# Patient Record
Sex: MALE | Race: WHITE | NOT HISPANIC OR LATINO | Employment: FULL TIME | URBAN - METROPOLITAN AREA
[De-identification: names, ages, dates, MRNs, and addresses within clinical notes are randomized per-mention and may not be internally consistent; named-entity substitution may affect disease eponyms.]

---

## 2017-02-07 ENCOUNTER — ALLSCRIPTS OFFICE VISIT (OUTPATIENT)
Dept: OTHER | Facility: OTHER | Age: 42
End: 2017-02-07

## 2017-06-26 ENCOUNTER — APPOINTMENT (OUTPATIENT)
Dept: RADIOLOGY | Facility: MEDICAL CENTER | Age: 42
End: 2017-06-26
Payer: COMMERCIAL

## 2017-06-26 ENCOUNTER — TRANSCRIBE ORDERS (OUTPATIENT)
Dept: ADMINISTRATIVE | Facility: HOSPITAL | Age: 42
End: 2017-06-26

## 2017-06-26 ENCOUNTER — ALLSCRIPTS OFFICE VISIT (OUTPATIENT)
Dept: OTHER | Facility: OTHER | Age: 42
End: 2017-06-26

## 2017-06-26 DIAGNOSIS — M54.41 LOW BACK PAIN WITH RIGHT-SIDED SCIATICA: ICD-10-CM

## 2017-06-26 DIAGNOSIS — M25.551 PAIN IN RIGHT HIP: ICD-10-CM

## 2017-06-26 PROCEDURE — 72110 X-RAY EXAM L-2 SPINE 4/>VWS: CPT

## 2017-06-26 PROCEDURE — 73502 X-RAY EXAM HIP UNI 2-3 VIEWS: CPT

## 2017-06-26 PROCEDURE — 72200 X-RAY EXAM SI JOINTS: CPT

## 2017-06-30 ENCOUNTER — ALLSCRIPTS OFFICE VISIT (OUTPATIENT)
Dept: OTHER | Facility: OTHER | Age: 42
End: 2017-06-30

## 2017-12-18 ENCOUNTER — ALLSCRIPTS OFFICE VISIT (OUTPATIENT)
Dept: OTHER | Facility: OTHER | Age: 42
End: 2017-12-18

## 2017-12-19 NOTE — PROGRESS NOTES
Assessment  1  Influenza (487 1) (J11 1)    Plan  Influenza    · Oseltamivir Phosphate 75 MG Oral Capsule (Tamiflu); TAKE 1 CAPSULE TWICEDAILY WITH MEALS   · Follow Up if Not Better Evaluation and Treatment  Follow-up  Status: Complete  Done:28Ahr5933 03:49PM    Discussion/Summary    Note for patient to remain out of work for 1 week  Chief Complaint  Patient has a cold since yesterday and feeling nausea and dizzy    Patient has of headaches, being tried, no post nasal drip, right ear hurts and has chills  Patient has no other concerns  History of Present Illness  HPI: Patient is here with cough, rhinorrhea, headache, fatigue, achy, chills over the past day  No diarrhea  Also the patient with nauseousness but no vomiting  No treatment  Review of Systems   Constitutional: as noted in HPI   ENT: as noted in HPI  Cardiovascular: no complaints of slow or fast heart rate, no chest pain, no palpitations, no leg claudication or lower extremity edema  Respiratory: as noted in HPI  Gastrointestinal: as noted in HPI  Genitourinary: no complaints of dysuria or incontinence, no hesitancy, no nocturia, no genital lesion, no inadequacy of penile erection  Musculoskeletal: as noted in HPI  Integumentary: no complaints of skin rash or lesion, no itching or dry skin, no skin wounds  Neurological: no complaints of headache, no confusion, no numbness or tingling, no dizziness or fainting  Active Problems  1  Abnormal level of serum enzyme (790 5) (R74 9)   2  Anal fissure (565 0) (K60 2)   3  Anxiety disorder due to medical condition (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 84) (F06 4)   4  Elevated C-reactive protein (790 95) (R79 82)   5  Erectile dysfunction of non-organic origin (302 72) (F52 21)   6  Esophageal reflux (530 81) (K21 9)   7  Generalized anxiety disorder (300 02) (F41 1)   8  Greater trochanteric bursitis, right (726 5) (M70 61)   9  Hemorrhoid (455 6) (K64 9)   10  Hypercholesterolemia (272 0) (E78 00)   11   Hypertension (401  9) (I10)   12  IFG (impaired fasting glucose) (790 21) (R73 01)   13  Obesity (BMI 30-39 9) (278 00) (E66 9)   14  Right hip pain (719 45) (M25 551)   15  Right-sided low back pain with sciatica (724 3) (M54 41)   16  Thyroid disorder (246 9) (E07 9)   17  Vitamin D deficiency (268 9) (E55 9)   18  Wart (078 10) (B07 9)    Past Medical History  1  History of Anxiety (300 00) (F41 9)    Family History  Mother    1  Family history of Hypertension (V17 49)   2  Family history of Type 2 Diabetes Mellitus  Father    3  Family history of Hypertension (V17 49)    Social History   · Being A Social Drinker   · Denied: History of Drug Use   · Never A Smoker    Surgical History  1  History of Excision Of Intra-Abdominal Mass   2  History of Inguinal Hernia Repair    Current Meds   1  ClonazePAM 2 MG Oral Tablet; TAKE 1 TABLET BY MOUTH 3 TIMES A DAY AS NEEDED; Therapy: 18HOG1036 to (Evaluate:06Jan2018)  Requested for: 52Vat9999; Last Rx:07Dec2017 Ordered   2  Meloxicam 15 MG Oral Tablet; TAKE 1 TABLET DAILY WITH FOOD; Therapy: 16NTE5769 to (Evaluate:32Zok4946)  Requested for: 88FXN7567; Last Rx:26Jun2017 Ordered   3  PredniSONE 10 MG Oral Tablet; Take 6 pills x 3 days, 4 pills x 3 days, 2 pills x 3 days, then 1 pill x 3 days; Therapy: 89OAY7520 to (Matthew Dixon)  Requested for: 26Jun2017; Last SV:92TXY2379 Ordered   4  Rosuvastatin Calcium 5 MG Oral Tablet; TAKE 1 TABLET DAILY; Therapy: 84AUJ9096 to (Evaluate:89Bvs2834)  Requested for: 28WNS3902; Last HY:57BMQ9643 Ordered   5  Staxyn 10 MG Oral Tablet Disintegrating; PLACE 1 TABLET ON TONGUE ABOUT 1 HOUR BEFORE SEXUAL ACTIVITY; Therapy: 28JJC6483 to (Evaluate:30Jan2018)  Requested for: 76Cxs8841; Last Rx:39Jem4558 Ordered   6  TraMADol HCl - 50 MG Oral Tablet; TAKE 1 TABLET EVERY 6 HOURS AS NEEDED FOR PAIN; Therapy: 33MRE0977 to (Evaluate:78Ygw1609); Last Rx:30Jun2017 Ordered    The medication list was reviewed and updated today  Allergies  1   Amoxicillin CAPS 2  Penicillins    Vitals   Recorded: 13AOR7046 03:37PM   Temperature 98 3 F, Tympanic   Systolic 311, RLE, Sitting   Diastolic 78, RLE, Sitting   Height 5 ft 11 in   Weight 272 lb    BMI Calculated 37 94   BSA Calculated 2 4     Physical Exam   Constitutional  General appearance: Abnormal  -- fatigue /tired  Eyes  Conjunctiva and lids: No swelling, erythema, or discharge  Pupils and irises: Equal, round and reactive to light  Ears, Nose, Mouth, and Throat  External inspection of ears and nose: Normal    Otoscopic examination: Tympanic membrance translucent with normal light reflex  Canals patent without erythema  Nasal mucosa, septum, and turbinates: Normal without edema or erythema  Oropharynx: Abnormal  -- postnasal drip  Pulmonary  Respiratory effort: No increased work of breathing or signs of respiratory distress  Auscultation of lungs: Clear to auscultation, equal breath sounds bilaterally, no wheezes, no rales, no rhonci  Cardiovascular  Palpation of heart: Normal PMI, no thrills  Auscultation of heart: Normal rate and rhythm, normal S1 and S2, without murmurs  Examination of extremities for edema and/or varicosities: Normal    Abdomen  Abdomen: Non-tender, no masses  Liver and spleen: No hepatomegaly or splenomegaly  Lymphatic  Palpation of lymph nodes in neck: No lymphadenopathy  Musculoskeletal  Gait and station: Abnormal    Digits and nails: Normal without clubbing or cyanosis  Inspection/palpation of joints, bones, and muscles: Abnormal    Skin  Skin and subcutaneous tissue: Normal without rashes or lesions  Neurologic  Cranial nerves: Cranial nerves 2-12 intact  Reflexes: 2+ and symmetric  Sensation: Abnormal  -- Decreased sensation and strength    Psychiatric  Orientation to person, place and time: Normal    Mood and affect: Normal          Signatures   Electronically signed by : Catina Hagan DO; Dec 18 2017  3:50PM EST                       (Author)

## 2017-12-19 NOTE — PROGRESS NOTES
Assessment  1  Influenza (487 1) (J11 1)   2  Hypertension (401 9) (I10)   3  Hypercholesterolemia (272 0) (E78 00)   4  Generalized anxiety disorder (300 02) (F41 1)    Plan  Anxiety disorder due to medical condition    · ClonazePAM 2 MG Oral Tablet; TAKE 1 TABLET BY MOUTH 3 TIMES A DAY ASNEEDED  Hypercholesterolemia    · Rosuvastatin Calcium 5 MG Oral Tablet (Crestor); TAKE 1 TABLET DAILY  Influenza    · Oseltamivir Phosphate 75 MG Oral Capsule (Tamiflu); TAKE 1 CAPSULE TWICEDAILY WITH MEALS   · Follow Up if Not Better Evaluation and Treatment  Follow-up  Status: Complete  Done:17Wxl7861    Chief Complaint  Patient needs refill for meds  No other concerns  History of Present Illness  The the patient is here for follow-up on anxiety, hypertension and a day  Patient will need refills  Patient with flu-like symptoms at this present time  See other note  Review of Systems   Constitutional: No fever or chills, feels well, no tiredness, no recent weight gain or weight loss  Eyes: No complaints of eye pain, no red eyes, no discharge from eyes, no itchy eyes  ENT: no complaints of earache, no hearing loss, no nosebleeds, no nasal discharge, no sore throat, no hoarseness  Cardiovascular: No complaints of slow heart rate, no fast heart rate, no chest pain, no palpitations, no leg claudication, no lower extremity  Respiratory: No complaints of shortness of breath, no wheezing, no cough, no SOB on exertion, no orthopnea or PND  Gastrointestinal: No complaints of abdominal pain, no constipation, no nausea or vomiting, no diarrhea or bloody stools  Genitourinary: No complaints of dysuria, no incontinence, no hesitancy, no nocturia, no genital lesion, no testicular pain  Musculoskeletal: No complaints of arthralgia, no myalgias, no joint swelling or stiffness, no limb pain or swelling  Integumentary: No complaints of skin rash or skin lesions, no itching, no skin wound, no dry skin    Neurological: No compliants of headache, no confusion, no convulsions, no numbness or tingling, no dizziness or fainting, no limb weakness, no difficulty walking  Psychiatric: Is not suicidal, no sleep disturbances, no anxiety or depression, no change in personality, no emotional problems  Endocrine: No complaints of proptosis, no hot flashes, no muscle weakness, no erectile dysfunction, no deepening of the voice, no feelings of weakness  Hematologic/Lymphatic: No complaints of swollen glands, no swollen glands in the neck, does not bleed easily, no easy bruising  Active Problems  1  Abnormal level of serum enzyme (790 5) (R74 9)   2  Anal fissure (565 0) (K60 2)   3  Anxiety disorder due to medical condition (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 84) (F06 4)   4  Elevated C-reactive protein (790 95) (R79 82)   5  Erectile dysfunction of non-organic origin (302 72) (F52 21)   6  Esophageal reflux (530 81) (K21 9)   7  Generalized anxiety disorder (300 02) (F41 1)   8  Greater trochanteric bursitis, right (726 5) (M70 61)   9  Hemorrhoid (455 6) (K64 9)   10  Hypercholesterolemia (272 0) (E78 00)   11  Hypertension (401 9) (I10)   12  IFG (impaired fasting glucose) (790 21) (R73 01)   13  Obesity (BMI 30-39 9) (278 00) (E66 9)   14  Right hip pain (719 45) (M25 551)   15  Right-sided low back pain with sciatica (724 3) (M54 41)   16  Thyroid disorder (246 9) (E07 9)   17  Vitamin D deficiency (268 9) (E55 9)   18  Wart (078 10) (B07 9)    Past Medical History  1  History of Anxiety (300 00) (F41 9)    The active problems and past medical history were reviewed and updated today  Surgical History  1  History of Excision Of Intra-Abdominal Mass   2  History of Inguinal Hernia Repair    Family History  Mother    1  Family history of Hypertension (V17 49)   2  Family history of Type 2 Diabetes Mellitus  Father    3  Family history of Hypertension (V17 49)    Social History   · Being A Social Drinker   · Denied: History of Drug Use   · Never A Smoker    Current Meds   1  ClonazePAM 2 MG Oral Tablet; TAKE 1 TABLET BY MOUTH 3 TIMES A DAY AS NEEDED; Therapy: 12ODS7532 to (Evaluate:06Jan2018)  Requested for: 62Juy0487; Last Rx:26Ywt5440 Ordered   2  Rosuvastatin Calcium 5 MG Oral Tablet; TAKE 1 TABLET DAILY; Therapy: 88BVX7384 to (Evaluate:23Dec2017)  Requested for: 16FNP3885; Last Rx:26Jun2017 Ordered   3  Staxyn 10 MG Oral Tablet Disintegrating; PLACE 1 TABLET ON TONGUE ABOUT 1 HOUR BEFORE SEXUAL ACTIVITY; Therapy: 49MCM6246 to (Evaluate:30Jan2018)  Requested for: 59Qeg0650; Last Rx:46Obd9007 Ordered    The medication list was reviewed and updated today  Allergies  1  Amoxicillin CAPS   2  Penicillins    Vitals  Vital Signs    Recorded: 52EKY0452 03:37PM   Temperature 98 3 F, Tympanic   Systolic 081, RLE, Sitting   Diastolic 78, RLE, Sitting   Height 5 ft 11 in   Weight 272 lb    BMI Calculated 37 94   BSA Calculated 2 4     Physical Exam   Constitutional  General appearance: Abnormal  -- fatigue /tired  Eyes  Conjunctiva and lids: No swelling, erythema, or discharge  Pupils and irises: Equal, round and reactive to light  Ears, Nose, Mouth, and Throat  External inspection of ears and nose: Normal    Otoscopic examination: Tympanic membrance translucent with normal light reflex  Canals patent without erythema  Nasal mucosa, septum, and turbinates: Normal without edema or erythema  Oropharynx: Abnormal  -- postnasal drip  Pulmonary  Respiratory effort: No increased work of breathing or signs of respiratory distress  Auscultation of lungs: Clear to auscultation, equal breath sounds bilaterally, no wheezes, no rales, no rhonci  Cardiovascular  Palpation of heart: Normal PMI, no thrills  Auscultation of heart: Normal rate and rhythm, normal S1 and S2, without murmurs  Examination of extremities for edema and/or varicosities: Normal    Abdomen  Abdomen: Non-tender, no masses  Liver and spleen: No hepatomegaly or splenomegaly     Lymphatic  Palpation of lymph nodes in neck: No lymphadenopathy  Musculoskeletal  Gait and station: Abnormal    Digits and nails: Normal without clubbing or cyanosis  Inspection/palpation of joints, bones, and muscles: Abnormal    Skin  Skin and subcutaneous tissue: Normal without rashes or lesions  Neurologic  Cranial nerves: Cranial nerves 2-12 intact  Reflexes: 2+ and symmetric  Sensation: No sensory loss     Psychiatric  Orientation to person, place and time: Normal    Mood and affect: Normal          Signatures   Electronically signed by : Lisseth Leach DO; Dec 18 2017  3:54PM EST                       (Author)

## 2018-01-12 VITALS
DIASTOLIC BLOOD PRESSURE: 70 MMHG | WEIGHT: 287.5 LBS | HEIGHT: 72 IN | SYSTOLIC BLOOD PRESSURE: 122 MMHG | BODY MASS INDEX: 38.94 KG/M2

## 2018-01-13 VITALS
BODY MASS INDEX: 36.44 KG/M2 | HEIGHT: 72 IN | SYSTOLIC BLOOD PRESSURE: 130 MMHG | DIASTOLIC BLOOD PRESSURE: 76 MMHG | WEIGHT: 269 LBS

## 2018-01-15 VITALS
DIASTOLIC BLOOD PRESSURE: 90 MMHG | HEIGHT: 72 IN | BODY MASS INDEX: 35.79 KG/M2 | WEIGHT: 264.25 LBS | SYSTOLIC BLOOD PRESSURE: 140 MMHG

## 2018-01-18 ENCOUNTER — ALLSCRIPTS OFFICE VISIT (OUTPATIENT)
Dept: OTHER | Facility: OTHER | Age: 43
End: 2018-01-18

## 2018-01-18 ENCOUNTER — APPOINTMENT (OUTPATIENT)
Dept: RADIOLOGY | Facility: MEDICAL CENTER | Age: 43
End: 2018-01-18
Payer: COMMERCIAL

## 2018-01-18 ENCOUNTER — TRANSCRIBE ORDERS (OUTPATIENT)
Dept: ADMINISTRATIVE | Facility: HOSPITAL | Age: 43
End: 2018-01-18

## 2018-01-18 DIAGNOSIS — R07.81 PLEURODYNIA: ICD-10-CM

## 2018-01-18 PROCEDURE — 71101 X-RAY EXAM UNILAT RIBS/CHEST: CPT

## 2018-01-19 ENCOUNTER — GENERIC CONVERSION - ENCOUNTER (OUTPATIENT)
Dept: OTHER | Facility: OTHER | Age: 43
End: 2018-01-19

## 2018-01-19 NOTE — PROGRESS NOTES
Assessment   1  Rib pain on right side (786 50) (R07 81)   2  Right shoulder pain (719 41) (M25 511)   3  Status post fall (V15 88) (Z91 81)    Plan   Rib pain on right side    · * XR RIBS RIGHT W PA CHEST MIN 3 VIEWS; Status:Active; Requested MMF:06BSX3461;   Rib pain on right side, Right shoulder pain, Status post fall    · Cyclobenzaprine HCl - 10 MG Oral Tablet; TAKE 1 TABLET AT BEDTIME   · Meloxicam 15 MG Oral Tablet; TAKE 1 TABLET DAILY WITH FOOD   · Follow Up if Not Better Evaluation and Treatment  Follow-up  Status: Complete  Done:    85NMZ5152 11:19AM    Discussion/Summary      Note for patient return to work on Tuesday  Patient will go for x-rays to rule out rib fracture  Patient status post fall  Patient right shoulder /trapezius muscle strain more muscular in nature  Patient will try Flexeril at night  Patient may use ice to right ribs 10 minutes at a time 3 to 4 times a day  patient may use Tylenol as needed  Possible side effects of new medications were reviewed with the patient/guardian today  The treatment plan was reviewed with the patient/guardian  The patient/guardian understands and agrees with the treatment plan      Chief Complaint   pt slipped and fell yesterday and landed on the R side of his body he c/o rib pain and soreness  History of Present Illness   HPI: Patient is here status post fall in his driveway falling forward hitting his right shoulder and causing pain to his right lower rib cage laterally  No loss of consciousness  No headaches  Patient also with some neck pain 4 to 5/10  patient with no radicular symptoms to left arm  Patient does have some pain in his right shoulder  No chest pain or shortness of breath  Patient has some pain right ribs with coughing sneezing etc  Patient did use Advil /Motrin        Review of Systems        Constitutional: as noted in HPI       ENT: no complaints of earache, no loss of hearing, no nosebleeds or nasal discharge, no sore throat or hoarseness  Cardiovascular: no complaints of slow or fast heart rate, no chest pain, no palpitations, no leg claudication or lower extremity edema  Respiratory: no complaints of shortness of breath, no wheezing or cough, no dyspnea on exertion, no orthopnea or PND  Gastrointestinal: no complaints of abdominal pain, no constipation, no nausea or vomiting, no diarrhea or bloody stools  Genitourinary: no complaints of dysuria or incontinence, no hesitancy, no nocturia, no genital lesion, no inadequacy of penile erection  Musculoskeletal: as noted in HPI  Integumentary: no complaints of skin rash or lesion, no itching or dry skin, no skin wounds  Neurological: no complaints of headache, no confusion, no numbness or tingling, no dizziness or fainting  Active Problems   1  Abnormal level of serum enzyme (790 5) (R74 9)   2  Anal fissure (565 0) (K60 2)   3  Anxiety disorder due to medical condition (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) (F06 4)   4  Elevated C-reactive protein (790 95) (R79 82)   5  Erectile dysfunction of non-organic origin (302 72) (F52 21)   6  Esophageal reflux (530 81) (K21 9)   7  Generalized anxiety disorder (300 02) (F41 1)   8  Greater trochanteric bursitis, right (726 5) (M70 61)   9  Hemorrhoid (455 6) (K64 9)   10  Hypercholesterolemia (272 0) (E78 00)   11  Hypertension (401 9) (I10)   12  IFG (impaired fasting glucose) (790 21) (R73 01)   13  Influenza (487 1) (J11 1)   14  Obesity (BMI 30-39 9) (278 00) (E66 9)   15  Right hip pain (719 45) (M25 551)   16  Right-sided low back pain with sciatica (724 3) (M54 41)   17  Thyroid disorder (246 9) (E07 9)   18  Vitamin D deficiency (268 9) (E55 9)   19  Wart (078 10) (B07 9)    Past Medical History   1  History of Anxiety (300 00) (F41 9)   2  History of Right shoulder pain (719 41) (M25 511)  Active Problems And Past Medical History Reviewed: The active problems and past medical history were reviewed and updated today        Family History   Mother    1  Family history of Hypertension (V17 49)   2  Family history of Type 2 Diabetes Mellitus  Father    3  Family history of Hypertension (V17 49)    Social History    · Being A Social Drinker   · Denied: History of Drug Use   · Never A Smoker    Surgical History   1  History of Excision Of Intra-Abdominal Mass   2  History of Inguinal Hernia Repair    Current Meds    1  ClonazePAM 2 MG Oral Tablet; TAKE 1 TABLET BY MOUTH 3 TIMES A DAY AS     NEEDED; Therapy: 26ZSX2530 to (Evaluate:18Mar2018)  Requested for: 55ERR1339; Last     Rx:74Txr6909 Ordered   2  Oseltamivir Phosphate 75 MG Oral Capsule; TAKE 1 CAPSULE TWICE DAILY WITH     MEALS; Therapy: 90YWC3534 to (Evaluate:23Dec2017)  Requested for: 18PBY7003; Last     Rx:16Ghu7043 Ordered   3  Rosuvastatin Calcium 5 MG Oral Tablet; TAKE 1 TABLET DAILY; Therapy: 66YRM9133 to (Evaluate:11Mar2018)  Requested for: 66NUR9645; Last     Rx:10Jan2018 Ordered   4  Staxyn 10 MG Oral Tablet Disintegrating; PLACE 1 TABLET ON TONGUE ABOUT 1     HOUR BEFORE SEXUAL ACTIVITY; Therapy: 44YVS6756 to (Evaluate:30Jan2018)  Requested for: 40Kok7876; Last     Rx:59Vqn8380 Ordered     The medication list was reviewed and updated today  Allergies   1  Amoxicillin CAPS   2  Penicillins    Vitals    Recorded: 65BMJ5455 35:22MR   Systolic 327   Diastolic 80   Height 5 ft 11 in   Weight 280 lb 2 oz   BMI Calculated 39 07   BSA Calculated 2 43     Physical Exam        Constitutional      General appearance: Abnormal  -- in pain  Eyes      Conjunctiva and lids: No swelling, erythema, or discharge  Pupils and irises: Equal, round and reactive to light  Ears, Nose, Mouth, and Throat      External inspection of ears and nose: Normal        Otoscopic examination: Tympanic membrance translucent with normal light reflex  Canals patent without erythema  Nasal mucosa, septum, and turbinates: Normal without edema or erythema         Oropharynx: Normal with no erythema, edema, exudate or lesions  Pulmonary      Respiratory effort: No increased work of breathing or signs of respiratory distress  Auscultation of lungs: Clear to auscultation, equal breath sounds bilaterally, no wheezes, no rales, no rhonci  Cardiovascular      Palpation of heart: Normal PMI, no thrills  Auscultation of heart: Normal rate and rhythm, normal S1 and S2, without murmurs  Examination of extremities for edema and/or varicosities: Normal        Abdomen      Abdomen: Non-tender, no masses  Liver and spleen: No hepatomegaly or splenomegaly  Lymphatic      Palpation of lymph nodes in neck: No lymphadenopathy  Musculoskeletal      Gait and station: Abnormal        Digits and nails: Abnormal  -- Paravertebral muscle spasm and pain in the right trapezius muscle  No cervical pain with palpation  Patient does have some pain over the [facility] joint on the right  Inspection/palpation of joints, bones, and muscles: Abnormal  -- Patient with pain over lower ribs laterally on the right  Skin      Skin and subcutaneous tissue: Normal without rashes or lesions  Neurologic      Cranial nerves: Cranial nerves 2-12 intact  Reflexes: 2+ and symmetric  Sensation: No sensory loss         Psychiatric      Orientation to person, place and time: Normal        Mood and affect: Normal           Signatures    Electronically signed by : Wong Moyer DO; Jan 18 2018 11:19AM EST                       (Author)

## 2018-01-23 ENCOUNTER — ALLSCRIPTS OFFICE VISIT (OUTPATIENT)
Dept: OTHER | Facility: OTHER | Age: 43
End: 2018-01-23

## 2018-01-23 VITALS
WEIGHT: 272 LBS | HEIGHT: 71 IN | TEMPERATURE: 98.3 F | DIASTOLIC BLOOD PRESSURE: 78 MMHG | BODY MASS INDEX: 38.08 KG/M2 | SYSTOLIC BLOOD PRESSURE: 134 MMHG

## 2018-01-23 VITALS
BODY MASS INDEX: 39.22 KG/M2 | SYSTOLIC BLOOD PRESSURE: 134 MMHG | WEIGHT: 280.13 LBS | DIASTOLIC BLOOD PRESSURE: 80 MMHG | HEIGHT: 71 IN

## 2018-01-23 NOTE — RESULT NOTES
Verified Results  * XR RIBS RIGHT W PA CHEST MIN 3 VIEWS 32CTY1485 12:11PM Chela De La Fuente Order Number: [id number]     Test Name Result Flag Reference   XR RIBS RIGHT W PA CHEST MIN 3 VIEWS (Report)     RIGHT RIBS AND CHEST - DUAL ENERGY     INDICATION: Right rib pain  COMPARISON: None     VIEWS: PA chest (including soft tissue/bone algorithms) and 3 views right hemithorax     IMAGES: 8     FINDINGS:     The cardiomediastinal silhouette is unremarkable  Lungs are clear  No pleural effusions  There is no pneumothorax  No rib fractures are identified  IMPRESSION:     1  No active pulmonary disease  2  No evidence of rib fractures         Workstation performed: [id number]     Signed by:   Jimbo Craft MD   1/18/18

## 2018-01-23 NOTE — MISCELLANEOUS
Message  Return to work or school:   Guerline Kaplan is under my professional care   He was seen in my office on 01/18/18     He is able to return to school on  01/23/18         Signatures   Electronically signed by : Lani Cochran, ; Jan 18 2018 11:22AM EST                       (Author)

## 2018-01-23 NOTE — MISCELLANEOUS
Message  Return to work or school:   Lashawn Vyas is under my professional care   He was seen in my office on 12/18/2017   He is able to return to work on  12/26/2017            Signatures   Electronically signed by : Merlin Console, MA; Dec 18 2017  4:00PM EST                       (Author)

## 2018-01-24 NOTE — MISCELLANEOUS
Message  Return to work or school:   Laura Rooney is under my professional care   He was seen in my office on 01/23/18   He is able to return to work on   01/25/18           Signatures   Electronically signed by : Ana Samuel, ; Jan 23 2018 10:35AM EST                       (Author)

## 2018-02-19 DIAGNOSIS — M54.2 NECK PAIN: Primary | ICD-10-CM

## 2018-02-19 RX ORDER — CYCLOBENZAPRINE HCL 10 MG
TABLET ORAL
Qty: 20 TABLET | Refills: 0 | Status: SHIPPED | OUTPATIENT
Start: 2018-02-19 | End: 2018-11-27

## 2018-05-02 DIAGNOSIS — F41.9 ANXIETY: Primary | ICD-10-CM

## 2018-05-02 NOTE — TELEPHONE ENCOUNTER
Patient is calling asking for a refill of his Klonopin  He only has 5 pills left and takes 3 pills daily  Please advise if you will fill this medication for the patient in Dr Ethan Hurtado absence? Thank you

## 2018-05-03 RX ORDER — CLONAZEPAM 2 MG/1
TABLET ORAL
Qty: 90 TABLET | Refills: 0 | OUTPATIENT
Start: 2018-05-03 | End: 2018-06-07 | Stop reason: SDUPTHER

## 2018-05-16 DIAGNOSIS — N52.8 OTHER MALE ERECTILE DYSFUNCTION: Primary | ICD-10-CM

## 2018-05-17 RX ORDER — SILDENAFIL CITRATE 100 MG
TABLET ORAL
Qty: 10 TABLET | Refills: 0 | Status: SHIPPED | OUTPATIENT
Start: 2018-05-17 | End: 2018-05-23 | Stop reason: SDUPTHER

## 2018-05-18 NOTE — TELEPHONE ENCOUNTER
Pharmacy called to inform us that patient insurance says this medication needs a prior auth; sending to [facility] to handle today

## 2018-05-23 ENCOUNTER — TELEPHONE (OUTPATIENT)
Dept: FAMILY MEDICINE CLINIC | Facility: CLINIC | Age: 43
End: 2018-05-23

## 2018-05-23 DIAGNOSIS — N52.8 OTHER MALE ERECTILE DYSFUNCTION: ICD-10-CM

## 2018-05-23 NOTE — TELEPHONE ENCOUNTER
[facility] notified our office the prescription for Viagra 100mg requires authorization through the pt's insurance  This Henrry Age has been initiated, awaiting response

## 2018-05-23 NOTE — TELEPHONE ENCOUNTER
Pt called stating he heard back from the insurance - the viagra is approved  He is requesting a new script for the brand name be submitted

## 2018-05-24 RX ORDER — SILDENAFIL CITRATE 100 MG
TABLET ORAL
Qty: 10 TABLET | Refills: 0 | Status: SHIPPED | OUTPATIENT
Start: 2018-05-24 | End: 2018-11-27

## 2018-05-30 NOTE — TELEPHONE ENCOUNTER
Per [facility], the Esteban Leigh has been approved  It is valid from 4/23/18 - 5/23/19  Valid for qty 10 per 10 days  I left the information on vmail at [facility] pharmacy as well as left message notifying pt

## 2018-06-05 DIAGNOSIS — F41.9 ANXIETY: ICD-10-CM

## 2018-06-05 DIAGNOSIS — N52.8 OTHER MALE ERECTILE DYSFUNCTION: ICD-10-CM

## 2018-06-06 RX ORDER — SILDENAFIL 25 MG/1
100 TABLET, FILM COATED ORAL AS NEEDED
Qty: 10 TABLET | Refills: 0 | OUTPATIENT
Start: 2018-06-06

## 2018-06-06 RX ORDER — CLONAZEPAM 2 MG/1
TABLET ORAL
Qty: 90 TABLET | Refills: 1 | OUTPATIENT
Start: 2018-06-06

## 2018-06-07 ENCOUNTER — OFFICE VISIT (OUTPATIENT)
Dept: FAMILY MEDICINE CLINIC | Facility: CLINIC | Age: 43
End: 2018-06-07
Payer: COMMERCIAL

## 2018-06-07 VITALS
SYSTOLIC BLOOD PRESSURE: 126 MMHG | DIASTOLIC BLOOD PRESSURE: 84 MMHG | HEIGHT: 72 IN | WEIGHT: 263.8 LBS | BODY MASS INDEX: 35.73 KG/M2

## 2018-06-07 DIAGNOSIS — F41.1 GENERALIZED ANXIETY DISORDER: ICD-10-CM

## 2018-06-07 DIAGNOSIS — E78.00 HYPERCHOLESTEROLEMIA: ICD-10-CM

## 2018-06-07 DIAGNOSIS — I10 ESSENTIAL HYPERTENSION: Primary | ICD-10-CM

## 2018-06-07 DIAGNOSIS — F41.9 ANXIETY: ICD-10-CM

## 2018-06-07 DIAGNOSIS — F52.21 ERECTILE DYSFUNCTION OF NON-ORGANIC ORIGIN: ICD-10-CM

## 2018-06-07 DIAGNOSIS — G89.29 CHRONIC PAIN OF LEFT KNEE: ICD-10-CM

## 2018-06-07 DIAGNOSIS — M25.562 CHRONIC PAIN OF LEFT KNEE: ICD-10-CM

## 2018-06-07 PROCEDURE — 3079F DIAST BP 80-89 MM HG: CPT | Performed by: FAMILY MEDICINE

## 2018-06-07 PROCEDURE — 3074F SYST BP LT 130 MM HG: CPT | Performed by: FAMILY MEDICINE

## 2018-06-07 PROCEDURE — 99214 OFFICE O/P EST MOD 30 MIN: CPT | Performed by: FAMILY MEDICINE

## 2018-06-07 RX ORDER — SILDENAFIL CITRATE 20 MG/1
20 TABLET ORAL 3 TIMES DAILY
Qty: 9 TABLET | Refills: 5 | Status: SHIPPED | OUTPATIENT
Start: 2018-06-07 | End: 2018-11-27

## 2018-06-07 RX ORDER — CLONAZEPAM 2 MG/1
2 TABLET ORAL 3 TIMES DAILY
Qty: 90 TABLET | Refills: 3 | Status: SHIPPED | OUTPATIENT
Start: 2018-06-07 | End: 2018-10-19 | Stop reason: SDUPTHER

## 2018-06-07 RX ORDER — ROSUVASTATIN CALCIUM 5 MG/1
5 TABLET, COATED ORAL DAILY
Qty: 90 TABLET | Refills: 1 | Status: SHIPPED | OUTPATIENT
Start: 2018-06-07 | End: 2018-10-19 | Stop reason: SDUPTHER

## 2018-06-07 NOTE — PROGRESS NOTES
Assessment/Plan:         Diagnoses and all orders for this visit:    Essential hypertension    Anxiety  -     clonazePAM (KlonoPIN) 2 mg tablet; Take 1 tablet (2 mg total) by mouth 3 (three) times a day    Hypercholesterolemia  -     rosuvastatin (CRESTOR) 5 mg tablet; Take 1 tablet (5 mg total) by mouth daily    Erectile dysfunction of non-organic origin  -     sildenafil (REVATIO) 20 mg tablet; Take 1 tablet (20 mg total) by mouth 3 (three) times a day  -     Ambulatory referral to Urogynecology; Future    Generalized anxiety disorder    Chronic pain of left knee  -     XR knee 3 vw left non injury; Future          Subjective:      Patient ID: Brenda Hess is a 39 y o  male  Patient here to follow-up on hyperlipidemia, ED and anxiety as well as having some left knee pain medially  Patient has pain on the medial aspect with some swelling and difficulty ambulating  No trauma  Patient still having some erectile issues  No chest pain or shortness of breath  Normal urination and defecation  All other review systems negative  The following portions of the patient's history were reviewed and updated as appropriate: allergies, current medications, past family history, past medical history, past social history, past surgical history and problem list     Review of Systems   Constitutional: Negative  HENT: Negative  Eyes: Negative  Respiratory: Negative  Cardiovascular: Negative  Gastrointestinal: Negative  Endocrine: Negative  Genitourinary: Negative  Musculoskeletal: Positive for arthralgias  Skin: Negative  Allergic/Immunologic: Negative  Neurological: Negative  Hematological: Negative  Psychiatric/Behavioral: Negative            Objective:      /84 (BP Location: Left arm, Patient Position: Sitting, Cuff Size: Adult)   Ht 5' 11 75" (1 822 m)   Wt 120 kg (263 lb 12 8 oz)   BMI 36 03 kg/m²          Physical Exam   Constitutional: He is oriented to person, place, and time  He appears well-developed and well-nourished  No distress  HENT:   Head: Normocephalic  Right Ear: External ear normal    Left Ear: External ear normal    Mouth/Throat: Oropharynx is clear and moist  No oropharyngeal exudate  Eyes: EOM are normal  Pupils are equal, round, and reactive to light  Right eye exhibits no discharge  Left eye exhibits no discharge  No scleral icterus  Neck: Normal range of motion  Neck supple  No thyromegaly present  Cardiovascular: Normal rate, regular rhythm, normal heart sounds and intact distal pulses  Exam reveals no gallop and no friction rub  No murmur heard  Pulmonary/Chest: Effort normal and breath sounds normal  No respiratory distress  He has no wheezes  He has no rales  He exhibits no tenderness  Abdominal: Soft  Bowel sounds are normal  He exhibits no distension  There is no tenderness  There is no rebound and no guarding  Musculoskeletal: Normal range of motion  He exhibits tenderness  He exhibits no edema  Pain with palpation over left knee medial joint line  No pain over lateral joint line  No pain testing mcl or LCL  Negative Lachman test   Positive Apley compression test    Lymphadenopathy:     He has no cervical adenopathy  Neurological: He is oriented to person, place, and time  No cranial nerve deficit  He exhibits normal muscle tone  Coordination normal    Skin: Skin is warm and dry  No rash noted  He is not diaphoretic  No erythema  No pallor  Psychiatric: He has a normal mood and affect  His behavior is normal  Judgment and thought content normal    Nursing note and vitals reviewed

## 2018-08-14 ENCOUNTER — TELEPHONE (OUTPATIENT)
Dept: FAMILY MEDICINE CLINIC | Facility: CLINIC | Age: 43
End: 2018-08-14

## 2018-09-04 ENCOUNTER — OFFICE VISIT (OUTPATIENT)
Dept: FAMILY MEDICINE CLINIC | Facility: CLINIC | Age: 43
End: 2018-09-04
Payer: COMMERCIAL

## 2018-09-04 VITALS
TEMPERATURE: 100.2 F | HEART RATE: 114 BPM | SYSTOLIC BLOOD PRESSURE: 148 MMHG | DIASTOLIC BLOOD PRESSURE: 72 MMHG | HEIGHT: 72 IN | BODY MASS INDEX: 36.3 KG/M2 | WEIGHT: 268 LBS

## 2018-09-04 DIAGNOSIS — T63.481A INSECT STINGS, ACCIDENTAL OR UNINTENTIONAL, INITIAL ENCOUNTER: Primary | ICD-10-CM

## 2018-09-04 PROCEDURE — 99213 OFFICE O/P EST LOW 20 MIN: CPT | Performed by: FAMILY MEDICINE

## 2018-09-04 PROCEDURE — 3008F BODY MASS INDEX DOCD: CPT | Performed by: FAMILY MEDICINE

## 2018-09-04 RX ORDER — CEPHALEXIN 250 MG/1
250 CAPSULE ORAL EVERY 8 HOURS SCHEDULED
Qty: 21 CAPSULE | Refills: 0 | Status: SHIPPED | OUTPATIENT
Start: 2018-09-04 | End: 2018-09-11

## 2018-09-04 NOTE — PROGRESS NOTES
Assessment/Plan: We talked about the natural course of the stings  I feel local care of the wound would help it heal   He is worried about infection and would like to be placed on an antibiotic  We discussed possible treatments and I did place him on an antibiotic  He will follow-up if not a lot better in 5-7 days  Diagnoses and all orders for this visit:    Insect stings, accidental or unintentional, initial encounter  -     mupirocin (BACTROBAN) 2 % ointment; Apply topically 3 (three) times a day  -     cephalexin (KEFLEX) 250 mg capsule; Take 1 capsule (250 mg total) by mouth every 8 (eight) hours for 7 days          Subjective:      Patient ID: Rj Vaughn is a 39 y o  male  He got an insect sting 2 days ago while he was mowing his grass  He is not sure what bit him but he did feel a bite  He treated at home with baking soda paste  The following portions of the patient's history were reviewed and updated as appropriate: allergies, current medications, past family history, past medical history, past social history, past surgical history and problem list     Review of Systems   Constitutional: Negative  HENT: Negative  Skin: Positive for wound  Objective:      /72 (BP Location: Left arm)   Pulse (!) 114   Temp 100 2 °F (37 9 °C)   Ht 5' 11 75" (1 822 m)   Wt 122 kg (268 lb)   BMI 36 60 kg/m²          Physical Exam   Constitutional: He is oriented to person, place, and time  He appears well-developed and well-nourished  Neck: Normal range of motion  Neck supple  Neurological: He is alert and oriented to person, place, and time  Skin:     There is a reddened area on his posterior left calf approximately 4 cm in diameter  The center has the clearing area that looks like the center of the sting  Nursing note and vitals reviewed

## 2018-10-19 ENCOUNTER — OFFICE VISIT (OUTPATIENT)
Dept: FAMILY MEDICINE CLINIC | Facility: CLINIC | Age: 43
End: 2018-10-19
Payer: COMMERCIAL

## 2018-10-19 VITALS
HEIGHT: 72 IN | WEIGHT: 266.2 LBS | BODY MASS INDEX: 36.06 KG/M2 | SYSTOLIC BLOOD PRESSURE: 130 MMHG | DIASTOLIC BLOOD PRESSURE: 78 MMHG | TEMPERATURE: 98.3 F

## 2018-10-19 DIAGNOSIS — F41.9 ANXIETY: ICD-10-CM

## 2018-10-19 DIAGNOSIS — F41.1 GENERALIZED ANXIETY DISORDER: ICD-10-CM

## 2018-10-19 DIAGNOSIS — E78.00 HYPERCHOLESTEROLEMIA: Primary | ICD-10-CM

## 2018-10-19 DIAGNOSIS — M25.551 RIGHT HIP PAIN: ICD-10-CM

## 2018-10-19 PROCEDURE — 99213 OFFICE O/P EST LOW 20 MIN: CPT | Performed by: FAMILY MEDICINE

## 2018-10-19 RX ORDER — ROSUVASTATIN CALCIUM 5 MG/1
5 TABLET, COATED ORAL DAILY
Qty: 90 TABLET | Refills: 0 | Status: SHIPPED | OUTPATIENT
Start: 2018-10-19 | End: 2019-03-14 | Stop reason: SDUPTHER

## 2018-10-19 RX ORDER — CLONAZEPAM 2 MG/1
2 TABLET ORAL 3 TIMES DAILY
Qty: 90 TABLET | Refills: 3 | Status: SHIPPED | OUTPATIENT
Start: 2018-10-19 | End: 2019-03-22 | Stop reason: SDUPTHER

## 2018-10-19 RX ORDER — KETOROLAC TROMETHAMINE 10 MG/1
10 TABLET, FILM COATED ORAL EVERY 6 HOURS PRN
Qty: 20 TABLET | Refills: 0 | Status: SHIPPED | OUTPATIENT
Start: 2018-10-19 | End: 2018-11-27

## 2018-10-19 NOTE — PROGRESS NOTES
Assessment/Plan:   refills on medications for anxiety as well as hyperlipidemia  Patient use Toradol for right hip pain as well as ibuprofen once Toradol done  Recommend ice as directed  Follow-up in 4 months       Diagnoses and all orders for this visit:    Hypercholesterolemia  -     rosuvastatin (CRESTOR) 5 mg tablet; Take 1 tablet (5 mg total) by mouth daily    Anxiety  -     clonazePAM (KlonoPIN) 2 mg tablet; Take 1 tablet (2 mg total) by mouth 3 (three) times a day    Generalized anxiety disorder    Right hip pain          Subjective:      Patient ID: Britni Green is a 39 y o  male  Patient here to follow-up on hyperlipidemia, hypertension, anxiety  Patient will need medication refills  Patient anxiety fairly stable overall  Patient with right hip pain laterally  No trauma noted        The following portions of the patient's history were reviewed and updated as appropriate: allergies, current medications, past family history, past medical history, past social history, past surgical history and problem list     Review of Systems   Constitutional: Negative  HENT: Negative  Eyes: Negative  Respiratory: Negative  Cardiovascular: Negative  Gastrointestinal: Negative  Endocrine: Negative  Genitourinary: Negative  Musculoskeletal: Positive for arthralgias  Skin: Negative  Allergic/Immunologic: Negative  Neurological: Negative  Hematological: Negative  Psychiatric/Behavioral: Negative  Objective:      /78 (BP Location: Right arm, Patient Position: Sitting, Cuff Size: Standard)   Temp 98 3 °F (36 8 °C) (Tympanic)   Ht 5' 11 75" (1 822 m)   Wt 121 kg (266 lb 3 2 oz)   BMI 36 36 kg/m²          Physical Exam   Constitutional: He is oriented to person, place, and time  He appears well-developed and well-nourished  No distress  HENT:   Head: Normocephalic     Right Ear: External ear normal    Left Ear: External ear normal    Mouth/Throat: Oropharynx is clear and moist  No oropharyngeal exudate  Eyes: Pupils are equal, round, and reactive to light  EOM are normal  Right eye exhibits no discharge  Left eye exhibits no discharge  No scleral icterus  Neck: Normal range of motion  Neck supple  No thyromegaly present  Cardiovascular: Normal rate, regular rhythm, normal heart sounds and intact distal pulses  Exam reveals no gallop and no friction rub  No murmur heard  Pulmonary/Chest: Effort normal and breath sounds normal  No respiratory distress  He has no wheezes  He has no rales  He exhibits no tenderness  Abdominal: Soft  Bowel sounds are normal  He exhibits no distension  There is no tenderness  There is no rebound and no guarding  Musculoskeletal: Normal range of motion  He exhibits tenderness  He exhibits no edema  Pain with palpation of the right greater trochanter   Lymphadenopathy:     He has no cervical adenopathy  Neurological: He is oriented to person, place, and time  No cranial nerve deficit  He exhibits normal muscle tone  Coordination normal    Skin: Skin is warm and dry  No rash noted  He is not diaphoretic  No erythema  No pallor  Psychiatric: He has a normal mood and affect  His behavior is normal  Judgment and thought content normal    Nursing note and vitals reviewed

## 2018-11-27 ENCOUNTER — OFFICE VISIT (OUTPATIENT)
Dept: FAMILY MEDICINE CLINIC | Facility: CLINIC | Age: 43
End: 2018-11-27
Payer: COMMERCIAL

## 2018-11-27 VITALS
HEIGHT: 71 IN | SYSTOLIC BLOOD PRESSURE: 134 MMHG | TEMPERATURE: 98.7 F | DIASTOLIC BLOOD PRESSURE: 94 MMHG | BODY MASS INDEX: 37.83 KG/M2 | WEIGHT: 270.2 LBS

## 2018-11-27 DIAGNOSIS — J11.1 INFLUENZA: Primary | ICD-10-CM

## 2018-11-27 DIAGNOSIS — M25.551 RIGHT HIP PAIN: Primary | ICD-10-CM

## 2018-11-27 PROCEDURE — 3008F BODY MASS INDEX DOCD: CPT | Performed by: FAMILY MEDICINE

## 2018-11-27 PROCEDURE — 99213 OFFICE O/P EST LOW 20 MIN: CPT | Performed by: FAMILY MEDICINE

## 2018-11-27 PROCEDURE — 1036F TOBACCO NON-USER: CPT | Performed by: FAMILY MEDICINE

## 2018-11-27 RX ORDER — OSELTAMIVIR PHOSPHATE 75 MG/1
75 CAPSULE ORAL EVERY 12 HOURS SCHEDULED
Qty: 10 CAPSULE | Refills: 0 | Status: SHIPPED | OUTPATIENT
Start: 2018-11-27 | End: 2018-12-02

## 2018-11-27 NOTE — PROGRESS NOTES
Assessment/Plan:  Patient use Tamiflu for influenza  Patient will have supportive care  Note for patient return to work Monday  The patient will see chiropractor for right hip pain  Diagnoses and all orders for this visit:    Influenza  -     oseltamivir (TAMIFLU) 75 mg capsule; Take 1 capsule (75 mg total) by mouth every 12 (twelve) hours for 5 days          Subjective:      Patient ID: Kenna Ring is a 39 y o  male  Patient is here with sore throat, cough, chills, body aches, earache and hip pain  Symptoms began last night  The patient feeling tired and achy all over  No medications use  Sore Throat    Associated symptoms include congestion, coughing and ear pain  Pertinent negatives include no vomiting  Cough   Associated symptoms include ear pain and a sore throat  Generalized Body Aches   Associated symptoms include congestion, ear pain, a sore throat, fatigue and coughing  Pertinent negatives include no nausea or vomiting  Earache    Associated symptoms include coughing and a sore throat  Pertinent negatives include no vomiting  Hip Pain          The following portions of the patient's history were reviewed and updated as appropriate: allergies, current medications, past family history, past medical history, past social history, past surgical history and problem list     Review of Systems   Constitutional: Positive for fatigue  HENT: Positive for congestion, ear pain and sore throat  Eyes: Negative  Respiratory: Positive for cough  Cardiovascular: Negative  Gastrointestinal: Negative  Negative for nausea and vomiting  Endocrine: Negative  Genitourinary: Negative  Musculoskeletal: Positive for arthralgias  Skin: Negative  Allergic/Immunologic: Negative  Neurological: Negative  Hematological: Negative  Psychiatric/Behavioral: Negative            Objective:      /94 (BP Location: Left arm, Patient Position: Sitting, Cuff Size: Adult)   Temp 98 7 °F (37 1 °C) (Tympanic)   Ht 5' 11" (1 803 m)   Wt 123 kg (270 lb 3 2 oz)   BMI 37 69 kg/m²          Physical Exam   Constitutional: He is oriented to person, place, and time  He appears well-developed and well-nourished  No distress  HENT:   Head: Normocephalic  Right Ear: External ear normal    Left Ear: External ear normal    Mouth/Throat: Oropharynx is clear and moist  No oropharyngeal exudate  Eyes: Pupils are equal, round, and reactive to light  EOM are normal  Right eye exhibits no discharge  Left eye exhibits no discharge  No scleral icterus  Neck: Normal range of motion  Neck supple  No thyromegaly present  Cardiovascular: Normal rate, regular rhythm, normal heart sounds and intact distal pulses  Exam reveals no gallop and no friction rub  No murmur heard  Pulmonary/Chest: Effort normal and breath sounds normal  No respiratory distress  He has no wheezes  He has no rales  He exhibits no tenderness  Musculoskeletal: Normal range of motion  He exhibits no edema or tenderness  Lymphadenopathy:     He has no cervical adenopathy  Neurological: He is oriented to person, place, and time  No cranial nerve deficit  He exhibits normal muscle tone  Coordination normal    Skin: Skin is warm and dry  No rash noted  He is not diaphoretic  No erythema  No pallor  Psychiatric: He has a normal mood and affect  His behavior is normal  Judgment and thought content normal    Nursing note and vitals reviewed

## 2018-12-21 DIAGNOSIS — N52.1 ERECTILE DISORDER DUE TO MEDICAL CONDITION IN MALE: Primary | ICD-10-CM

## 2018-12-21 RX ORDER — VARDENAFIL HCL 10 MG
TABLET,DISINTEGRATING ORAL
Qty: 10 TABLET | Refills: 2 | Status: SHIPPED | OUTPATIENT
Start: 2018-12-21 | End: 2019-08-13

## 2019-01-21 ENCOUNTER — OFFICE VISIT (OUTPATIENT)
Dept: FAMILY MEDICINE CLINIC | Facility: CLINIC | Age: 44
End: 2019-01-21
Payer: COMMERCIAL

## 2019-01-21 VITALS
OXYGEN SATURATION: 98 % | BODY MASS INDEX: 38.78 KG/M2 | HEIGHT: 71 IN | TEMPERATURE: 100.2 F | WEIGHT: 277 LBS | HEART RATE: 117 BPM | DIASTOLIC BLOOD PRESSURE: 92 MMHG | SYSTOLIC BLOOD PRESSURE: 156 MMHG

## 2019-01-21 DIAGNOSIS — J01.00 ACUTE NON-RECURRENT MAXILLARY SINUSITIS: Primary | ICD-10-CM

## 2019-01-21 DIAGNOSIS — I10 ESSENTIAL HYPERTENSION: ICD-10-CM

## 2019-01-21 PROCEDURE — 99214 OFFICE O/P EST MOD 30 MIN: CPT | Performed by: FAMILY MEDICINE

## 2019-01-21 RX ORDER — AZITHROMYCIN 250 MG/1
TABLET, FILM COATED ORAL
Qty: 6 TABLET | Refills: 0 | Status: SHIPPED | OUTPATIENT
Start: 2019-01-21 | End: 2019-01-25

## 2019-01-21 NOTE — PROGRESS NOTES
Assessment/Plan:     Diagnoses and all orders for this visit:    Acute non-recurrent maxillary sinusitis  -     azithromycin (ZITHROMAX) 250 mg tablet; Take 2 tablets today then 1 tablet daily x 4 days          Subjective:      Patient ID: Eugenia Infante is a 39 y o  male  Patient is here with left ear pain and sore throat which began on Saturday  Patient does notice glandular swelling and pain as well as sinus issues  The patient has cough with yellow sputum production as well as body aches  Patient is feeling hot and cold intermittently  The patient is using DayQuil and NyQuil      Ear Fullness    Associated symptoms include coughing, rhinorrhea and a sore throat  Sore Throat    Associated symptoms include coughing, ear pain and a plugged ear sensation  Cough   Associated symptoms include chills, ear pain, a fever, postnasal drip, rhinorrhea and a sore throat  Generalized Body Aches   Associated symptoms include ear pain, rhinorrhea, a sore throat, a fever and coughing  Pertinent negatives include no fatigue  The following portions of the patient's history were reviewed and updated as appropriate: allergies, current medications, past family history, past medical history, past social history, past surgical history and problem list     Review of Systems   Constitutional: Positive for chills and fever  Negative for fatigue  HENT: Positive for ear pain, postnasal drip, rhinorrhea, sinus pain, sinus pressure and sore throat  Eyes: Negative  Respiratory: Positive for cough  Cardiovascular: Negative  Gastrointestinal: Negative  Endocrine: Negative  Genitourinary: Negative  Musculoskeletal: Positive for arthralgias  Skin: Negative  Allergic/Immunologic: Negative  Neurological: Negative  Hematological: Negative  Psychiatric/Behavioral: Negative            Objective:      /92 (BP Location: Right arm, Patient Position: Sitting, Cuff Size: Large)   Pulse (!) 117 Temp 100 2 °F (37 9 °C) (Tympanic)   Ht 5' 11" (1 803 m)   Wt 126 kg (277 lb)   SpO2 98%   BMI 38 63 kg/m²          Physical Exam   Constitutional: He is oriented to person, place, and time  He appears well-developed and well-nourished  No distress  HENT:   Head: Normocephalic  Right Ear: External ear normal    Left Ear: External ear normal    Mouth/Throat: Oropharyngeal exudate present  Eyes: Pupils are equal, round, and reactive to light  EOM are normal  Right eye exhibits no discharge  Left eye exhibits no discharge  No scleral icterus  Neck: Normal range of motion  Neck supple  No thyromegaly present  Cardiovascular: Normal rate, regular rhythm, normal heart sounds and intact distal pulses  Exam reveals no gallop and no friction rub  No murmur heard  Pulmonary/Chest: Effort normal and breath sounds normal  No respiratory distress  He has no wheezes  He has no rales  He exhibits no tenderness  Musculoskeletal: Normal range of motion  He exhibits no edema, tenderness or deformity  Lymphadenopathy:     He has no cervical adenopathy  Neurological: He is alert and oriented to person, place, and time  Skin: Skin is warm and dry  No rash noted  He is not diaphoretic  No erythema  No pallor  Psychiatric: He has a normal mood and affect  His behavior is normal  Judgment and thought content normal    Nursing note and vitals reviewed

## 2019-03-04 ENCOUNTER — OFFICE VISIT (OUTPATIENT)
Dept: FAMILY MEDICINE CLINIC | Facility: CLINIC | Age: 44
End: 2019-03-04
Payer: COMMERCIAL

## 2019-03-04 VITALS
HEIGHT: 71 IN | HEART RATE: 113 BPM | BODY MASS INDEX: 40.18 KG/M2 | SYSTOLIC BLOOD PRESSURE: 122 MMHG | DIASTOLIC BLOOD PRESSURE: 78 MMHG | WEIGHT: 287 LBS | RESPIRATION RATE: 16 BRPM | OXYGEN SATURATION: 97 %

## 2019-03-04 DIAGNOSIS — M54.16 LUMBAR RADICULITIS: Primary | ICD-10-CM

## 2019-03-04 PROCEDURE — 99213 OFFICE O/P EST LOW 20 MIN: CPT | Performed by: FAMILY MEDICINE

## 2019-03-04 PROCEDURE — 1036F TOBACCO NON-USER: CPT | Performed by: FAMILY MEDICINE

## 2019-03-04 PROCEDURE — 3008F BODY MASS INDEX DOCD: CPT | Performed by: FAMILY MEDICINE

## 2019-03-04 RX ORDER — METHYLPREDNISOLONE 4 MG/1
TABLET ORAL
Qty: 1 EACH | Refills: 0 | Status: ON HOLD | OUTPATIENT
Start: 2019-03-04 | End: 2019-04-19 | Stop reason: ALTCHOICE

## 2019-03-04 RX ORDER — CYCLOBENZAPRINE HCL 10 MG
10 TABLET ORAL
Qty: 30 TABLET | Refills: 0 | Status: SHIPPED | OUTPATIENT
Start: 2019-03-04 | End: 2019-04-04 | Stop reason: SDUPTHER

## 2019-03-04 NOTE — LETTER
March 4, 2019     Patient: Ted Bearden   YOB: 1975   Date of Visit: 3/4/2019       To Whom it May Concern:    Rome Jimenes is under my professional care  He was seen in my office on 3/4/2019  He may return to work on 03/07/2019  If you have any questions or concerns, please don't hesitate to call           Sincerely,          Patrick Kline DO        CC: No Recipients

## 2019-03-04 NOTE — PROGRESS NOTES
Assessment/Plan:  Ice recommended 10 minutes to 15 minutes at a time 4 times a day the  Patient use Medrol Dosepak as directed  Patient will use ibuprofen 600 mg 3 times a day with food  The patient use Flexeril nightly  Note for patient to return to work on Thursday  Diagnoses and all orders for this visit:    Lumbar radiculitis  -     methylPREDNISolone 4 MG tablet therapy pack; Use as directed on package  -     cyclobenzaprine (FLEXERIL) 10 mg tablet; Take 1 tablet (10 mg total) by mouth daily at bedtime          Subjective:      Patient ID: Nima Wood is a 38 y o  male  The patient is here with lumbar pain status post shoveling snow today  Patient with pain lower lumbar region with some radiation into the bilateral buttocks  No radiation down legs beyond that  No problems with urinary or bowel incontinence  Patient tried ice and heat as well as Motrin without significant improvement  Pain 7- 8/10  No trauma noted      The following portions of the patient's history were reviewed and updated as appropriate: allergies, current medications, past family history, past medical history, past social history, past surgical history and problem list     Review of Systems   Constitutional: Negative  HENT: Negative  Eyes: Negative  Respiratory: Negative  Cardiovascular: Negative  Gastrointestinal: Negative  Endocrine: Negative  Genitourinary: Negative  Musculoskeletal: Positive for arthralgias and back pain  Skin: Negative  Allergic/Immunologic: Negative  Neurological: Negative  Hematological: Negative  Psychiatric/Behavioral: Negative  Objective:      /78 (BP Location: Left arm, Patient Position: Standing, Cuff Size: Large)   Pulse (!) 113   Resp 16   Ht 5' 11" (1 803 m)   Wt 130 kg (287 lb)   SpO2 97%   BMI 40 03 kg/m²          Physical Exam   Constitutional: He appears well-developed and well-nourished     Musculoskeletal: He exhibits tenderness  Tenderness with palpation lower lumbar region bilaterally  Patient does get some radiation to bilateral buttocks with palpation  Patient does notice some pain radiating into buttocks with flexion at the waist    Nursing note and vitals reviewed

## 2019-03-14 DIAGNOSIS — E78.00 HYPERCHOLESTEROLEMIA: ICD-10-CM

## 2019-03-14 RX ORDER — ROSUVASTATIN CALCIUM 5 MG/1
TABLET, COATED ORAL
Qty: 90 TABLET | Refills: 0 | Status: SHIPPED | OUTPATIENT
Start: 2019-03-14 | End: 2019-06-11 | Stop reason: SDUPTHER

## 2019-03-22 DIAGNOSIS — F41.9 ANXIETY: ICD-10-CM

## 2019-03-22 RX ORDER — CLONAZEPAM 2 MG/1
2 TABLET ORAL 3 TIMES DAILY
Qty: 90 TABLET | Refills: 3 | Status: SHIPPED | OUTPATIENT
Start: 2019-03-22 | End: 2019-03-25 | Stop reason: SDUPTHER

## 2019-03-25 DIAGNOSIS — F41.9 ANXIETY: ICD-10-CM

## 2019-03-26 RX ORDER — CLONAZEPAM 2 MG/1
TABLET ORAL
Qty: 90 TABLET | Refills: 1 | Status: SHIPPED | OUTPATIENT
Start: 2019-03-26 | End: 2019-06-10 | Stop reason: SDUPTHER

## 2019-04-04 DIAGNOSIS — M54.16 LUMBAR RADICULITIS: ICD-10-CM

## 2019-04-05 RX ORDER — CYCLOBENZAPRINE HCL 10 MG
10 TABLET ORAL
Qty: 30 TABLET | Refills: 0 | Status: SHIPPED | OUTPATIENT
Start: 2019-04-05 | End: 2019-05-06 | Stop reason: SDUPTHER

## 2019-04-18 ENCOUNTER — ANESTHESIA EVENT (OUTPATIENT)
Dept: PERIOP | Facility: HOSPITAL | Age: 44
End: 2019-04-18
Payer: COMMERCIAL

## 2019-04-19 ENCOUNTER — HOSPITAL ENCOUNTER (OUTPATIENT)
Facility: HOSPITAL | Age: 44
Setting detail: OUTPATIENT SURGERY
Discharge: HOME | End: 2019-04-19
Attending: SURGERY | Admitting: SURGERY
Payer: COMMERCIAL

## 2019-04-19 ENCOUNTER — ANESTHESIA (OUTPATIENT)
Dept: PERIOP | Facility: HOSPITAL | Age: 44
End: 2019-04-19
Payer: COMMERCIAL

## 2019-04-19 VITALS
WEIGHT: 265 LBS | SYSTOLIC BLOOD PRESSURE: 136 MMHG | DIASTOLIC BLOOD PRESSURE: 72 MMHG | RESPIRATION RATE: 18 BRPM | TEMPERATURE: 98 F | BODY MASS INDEX: 35.89 KG/M2 | OXYGEN SATURATION: 99 % | HEIGHT: 72 IN | HEART RATE: 82 BPM

## 2019-04-19 DIAGNOSIS — R19.07 GENERALIZED INTRA-ABDOMINAL AND PELVIC SWELLING, MASS AND LUMP: ICD-10-CM

## 2019-04-19 DIAGNOSIS — N63.20 MASS OF LEFT BREAST: ICD-10-CM

## 2019-04-19 PROBLEM — D17.1 LIPOMA OF BREAST: Status: ACTIVE | Noted: 2019-04-19

## 2019-04-19 PROCEDURE — 88304 TISSUE EXAM BY PATHOLOGIST: CPT | Performed by: PATHOLOGY

## 2019-04-19 RX ORDER — MAGNESIUM HYDROXIDE 1200 MG/15ML
LIQUID ORAL AS NEEDED
Status: DISCONTINUED | OUTPATIENT
Start: 2019-04-19 | End: 2019-04-19 | Stop reason: HOSPADM

## 2019-04-19 RX ORDER — OXYCODONE HYDROCHLORIDE AND ACETAMINOPHEN 5; 325 MG/1; MG/1
1 TABLET ORAL EVERY 4 HOURS PRN
Status: DISCONTINUED | OUTPATIENT
Start: 2019-04-19 | End: 2019-04-19 | Stop reason: HOSPADM

## 2019-04-19 RX ORDER — CEFAZOLIN SODIUM 2 G/50ML
2000 SOLUTION INTRAVENOUS ONCE
Status: COMPLETED | OUTPATIENT
Start: 2019-04-19 | End: 2019-04-19

## 2019-04-19 RX ORDER — SODIUM CHLORIDE, SODIUM LACTATE, POTASSIUM CHLORIDE, CALCIUM CHLORIDE 600; 310; 30; 20 MG/100ML; MG/100ML; MG/100ML; MG/100ML
50 INJECTION, SOLUTION INTRAVENOUS CONTINUOUS
Status: DISCONTINUED | OUTPATIENT
Start: 2019-04-20 | End: 2019-04-19 | Stop reason: HOSPADM

## 2019-04-19 RX ORDER — HYDROMORPHONE HCL/PF 1 MG/ML
0.5 SYRINGE (ML) INJECTION
Status: DISCONTINUED | OUTPATIENT
Start: 2019-04-19 | End: 2019-04-19 | Stop reason: HOSPADM

## 2019-04-19 RX ORDER — MIDAZOLAM HYDROCHLORIDE 1 MG/ML
INJECTION INTRAMUSCULAR; INTRAVENOUS AS NEEDED
Status: DISCONTINUED | OUTPATIENT
Start: 2019-04-19 | End: 2019-04-19 | Stop reason: SURG

## 2019-04-19 RX ORDER — PROMETHAZINE HYDROCHLORIDE 25 MG/ML
12.5 INJECTION, SOLUTION INTRAMUSCULAR; INTRAVENOUS ONCE AS NEEDED
Status: DISCONTINUED | OUTPATIENT
Start: 2019-04-19 | End: 2019-04-19 | Stop reason: HOSPADM

## 2019-04-19 RX ORDER — ONDANSETRON 2 MG/ML
INJECTION INTRAMUSCULAR; INTRAVENOUS AS NEEDED
Status: DISCONTINUED | OUTPATIENT
Start: 2019-04-19 | End: 2019-04-19 | Stop reason: SURG

## 2019-04-19 RX ORDER — FENTANYL CITRATE 50 UG/ML
INJECTION, SOLUTION INTRAMUSCULAR; INTRAVENOUS AS NEEDED
Status: DISCONTINUED | OUTPATIENT
Start: 2019-04-19 | End: 2019-04-19 | Stop reason: SURG

## 2019-04-19 RX ORDER — LIDOCAINE HYDROCHLORIDE 10 MG/ML
INJECTION, SOLUTION INFILTRATION; PERINEURAL AS NEEDED
Status: DISCONTINUED | OUTPATIENT
Start: 2019-04-19 | End: 2019-04-19 | Stop reason: SURG

## 2019-04-19 RX ORDER — PROPOFOL 10 MG/ML
INJECTION, EMULSION INTRAVENOUS AS NEEDED
Status: DISCONTINUED | OUTPATIENT
Start: 2019-04-19 | End: 2019-04-19 | Stop reason: SURG

## 2019-04-19 RX ORDER — DEXAMETHASONE SODIUM PHOSPHATE 10 MG/ML
INJECTION, SOLUTION INTRAMUSCULAR; INTRAVENOUS AS NEEDED
Status: DISCONTINUED | OUTPATIENT
Start: 2019-04-19 | End: 2019-04-19 | Stop reason: SURG

## 2019-04-19 RX ORDER — LIDOCAINE HYDROCHLORIDE AND EPINEPHRINE 10; 10 MG/ML; UG/ML
INJECTION, SOLUTION INFILTRATION; PERINEURAL AS NEEDED
Status: DISCONTINUED | OUTPATIENT
Start: 2019-04-19 | End: 2019-04-19 | Stop reason: HOSPADM

## 2019-04-19 RX ORDER — SODIUM CHLORIDE 9 MG/ML
INJECTION, SOLUTION INTRAVENOUS CONTINUOUS PRN
Status: DISCONTINUED | OUTPATIENT
Start: 2019-04-19 | End: 2019-04-19 | Stop reason: SURG

## 2019-04-19 RX ORDER — ONDANSETRON 2 MG/ML
4 INJECTION INTRAMUSCULAR; INTRAVENOUS ONCE AS NEEDED
Status: DISCONTINUED | OUTPATIENT
Start: 2019-04-19 | End: 2019-04-19 | Stop reason: HOSPADM

## 2019-04-19 RX ADMIN — PROPOFOL 200 MG: 10 INJECTION, EMULSION INTRAVENOUS at 07:37

## 2019-04-19 RX ADMIN — LIDOCAINE HYDROCHLORIDE 50 MG: 10 INJECTION, SOLUTION INFILTRATION; PERINEURAL at 07:37

## 2019-04-19 RX ADMIN — FENTANYL CITRATE 100 MCG: 50 INJECTION INTRAMUSCULAR; INTRAVENOUS at 08:22

## 2019-04-19 RX ADMIN — FENTANYL CITRATE 50 MCG: 50 INJECTION INTRAMUSCULAR; INTRAVENOUS at 07:38

## 2019-04-19 RX ADMIN — SODIUM CHLORIDE, POTASSIUM CHLORIDE, SODIUM LACTATE AND CALCIUM CHLORIDE 50 ML/HR: 600; 310; 30; 20 INJECTION, SOLUTION INTRAVENOUS at 07:21

## 2019-04-19 RX ADMIN — SODIUM CHLORIDE: 0.9 INJECTION, SOLUTION INTRAVENOUS at 07:25

## 2019-04-19 RX ADMIN — CEFAZOLIN SODIUM 2000 MG: 2 SOLUTION INTRAVENOUS at 07:35

## 2019-04-19 RX ADMIN — MIDAZOLAM HYDROCHLORIDE 2 MG: 1 INJECTION, SOLUTION INTRAMUSCULAR; INTRAVENOUS at 07:35

## 2019-04-19 RX ADMIN — FENTANYL CITRATE 50 MCG: 50 INJECTION INTRAMUSCULAR; INTRAVENOUS at 07:41

## 2019-04-19 RX ADMIN — SODIUM CHLORIDE: 0.9 INJECTION, SOLUTION INTRAVENOUS at 08:36

## 2019-04-19 RX ADMIN — ONDANSETRON HYDROCHLORIDE 4 MG: 2 INJECTION, SOLUTION INTRAMUSCULAR; INTRAVENOUS at 07:47

## 2019-04-19 RX ADMIN — DEXAMETHASONE SODIUM PHOSPHATE 4 MG: 10 INJECTION, SOLUTION INTRAMUSCULAR; INTRAVENOUS at 07:47

## 2019-04-19 RX ADMIN — OXYCODONE HYDROCHLORIDE AND ACETAMINOPHEN 1 TABLET: 5; 325 TABLET ORAL at 10:10

## 2019-05-06 DIAGNOSIS — M54.16 LUMBAR RADICULITIS: ICD-10-CM

## 2019-05-06 RX ORDER — CYCLOBENZAPRINE HCL 10 MG
10 TABLET ORAL
Qty: 30 TABLET | Refills: 0 | Status: SHIPPED | OUTPATIENT
Start: 2019-05-06 | End: 2019-08-13

## 2019-06-10 DIAGNOSIS — F41.9 ANXIETY: ICD-10-CM

## 2019-06-10 RX ORDER — CLONAZEPAM 2 MG/1
2 TABLET ORAL 3 TIMES DAILY
Qty: 90 TABLET | Refills: 0 | Status: SHIPPED | OUTPATIENT
Start: 2019-06-10 | End: 2019-08-13 | Stop reason: SDUPTHER

## 2019-06-10 RX ORDER — CLONAZEPAM 2 MG/1
TABLET ORAL
Qty: 90 TABLET | Refills: 0 | Status: SHIPPED | OUTPATIENT
Start: 2019-06-10 | End: 2019-06-10 | Stop reason: SDUPTHER

## 2019-06-11 DIAGNOSIS — E78.00 HYPERCHOLESTEROLEMIA: ICD-10-CM

## 2019-06-11 RX ORDER — ROSUVASTATIN CALCIUM 5 MG/1
TABLET, COATED ORAL
Qty: 90 TABLET | Refills: 0 | Status: SHIPPED | OUTPATIENT
Start: 2019-06-11 | End: 2019-09-07 | Stop reason: SDUPTHER

## 2019-07-12 DIAGNOSIS — F41.9 ANXIETY: ICD-10-CM

## 2019-07-12 RX ORDER — CLONAZEPAM 2 MG/1
TABLET ORAL
Qty: 90 TABLET | Refills: 0 | Status: SHIPPED | OUTPATIENT
Start: 2019-07-12 | End: 2019-08-13

## 2019-08-13 ENCOUNTER — OFFICE VISIT (OUTPATIENT)
Dept: FAMILY MEDICINE CLINIC | Facility: CLINIC | Age: 44
End: 2019-08-13
Payer: COMMERCIAL

## 2019-08-13 VITALS
WEIGHT: 268 LBS | SYSTOLIC BLOOD PRESSURE: 140 MMHG | BODY MASS INDEX: 36.3 KG/M2 | TEMPERATURE: 97.7 F | DIASTOLIC BLOOD PRESSURE: 100 MMHG | HEIGHT: 72 IN

## 2019-08-13 DIAGNOSIS — R03.0 ELEVATED BLOOD PRESSURE READING: ICD-10-CM

## 2019-08-13 DIAGNOSIS — R73.01 IFG (IMPAIRED FASTING GLUCOSE): ICD-10-CM

## 2019-08-13 DIAGNOSIS — F41.9 ANXIETY: ICD-10-CM

## 2019-08-13 DIAGNOSIS — I10 ESSENTIAL HYPERTENSION: Primary | ICD-10-CM

## 2019-08-13 PROCEDURE — 3008F BODY MASS INDEX DOCD: CPT | Performed by: FAMILY MEDICINE

## 2019-08-13 PROCEDURE — 99213 OFFICE O/P EST LOW 20 MIN: CPT | Performed by: FAMILY MEDICINE

## 2019-08-13 PROCEDURE — 1036F TOBACCO NON-USER: CPT | Performed by: FAMILY MEDICINE

## 2019-08-13 RX ORDER — CLONAZEPAM 2 MG/1
2 TABLET ORAL 3 TIMES DAILY
Qty: 90 TABLET | Refills: 0 | Status: SHIPPED | OUTPATIENT
Start: 2019-08-13 | End: 2019-09-16 | Stop reason: SDUPTHER

## 2019-08-13 NOTE — PROGRESS NOTES
Assessment/Plan: To consider beta-blocker in the future  Patient go for laboratory studies  Further workup pending the way patient is feeling  Follow-up 1 month       Diagnoses and all orders for this visit:    Essential hypertension  -     CBC and differential; Future  -     Comprehensive metabolic panel; Future  -     Lipid panel; Future  -     TSH, 3rd generation with Free T4 reflex; Future  -     Hemoglobin A1C; Future    Anxiety  -     clonazePAM (KlonoPIN) 2 mg tablet; Take 1 tablet (2 mg total) by mouth 3 (three) times a day    IFG (impaired fasting glucose)  -     CBC and differential; Future  -     Comprehensive metabolic panel; Future  -     Lipid panel; Future  -     TSH, 3rd generation with Free T4 reflex; Future  -     Hemoglobin A1C; Future    Elevated blood pressure reading            Subjective:        Patient ID: Brenda Hess is a 38 y o  male  Patient here due to having palpitations intermittently  Patient workup with echo in the past   The patient has seen Cardiology in the past the   no Chest pain or shortness of the patient does have anxiety  Patient refusing EKG  No increased use of caffeine or chocolate  The following portions of the patient's history were reviewed and updated as appropriate: allergies, current medications, past family history, past medical history, past social history, past surgical history and problem list       Review of Systems   Constitutional: Negative  HENT: Negative  Eyes: Negative  Respiratory: Negative  Cardiovascular: Positive for palpitations  Gastrointestinal: Negative  Endocrine: Negative  Genitourinary: Negative  Musculoskeletal: Negative  Skin: Negative  Allergic/Immunologic: Negative  Neurological: Negative  Hematological: Negative  Psychiatric/Behavioral: The patient is nervous/anxious  Objective:      BMI Counseling: Body mass index is 36 35 kg/m²  Discussed the patient's BMI with him   The BMI is above average  BMI counseling and education was provided to the patient  Nutrition recommendations include reducing portion sizes  Depression Screening Follow-up Plan: Patient's depression screening was positive with a PHQ-2 score of 0  Their PHQ-9 score was   Patient assessed for underlying major depression  They have no active suicidal ideations  Brief counseling provided and recommend additional follow-up/re-evaluation next office visit  /100 (BP Location: Right arm, Patient Position: Sitting, Cuff Size: Adult)   Temp 97 7 °F (36 5 °C) (Tympanic)   Ht 6' (1 829 m)   Wt 122 kg (268 lb)   BMI 36 35 kg/m²          Physical Exam   Constitutional: He appears well-developed and well-nourished  HENT:   Head: Normocephalic and atraumatic  Cardiovascular: Normal rate, regular rhythm, normal heart sounds and intact distal pulses  Pulmonary/Chest: Effort normal and breath sounds normal    Neurological: He is alert  Psychiatric: He has a normal mood and affect  His behavior is normal  Judgment and thought content normal    Nursing note and vitals reviewed

## 2019-08-29 ENCOUNTER — TRANSCRIBE ORDERS (OUTPATIENT)
Dept: URGENT CARE | Facility: MEDICAL CENTER | Age: 44
End: 2019-08-29

## 2019-08-29 ENCOUNTER — APPOINTMENT (OUTPATIENT)
Dept: URGENT CARE | Facility: MEDICAL CENTER | Age: 44
End: 2019-08-29
Payer: OTHER MISCELLANEOUS

## 2019-08-29 ENCOUNTER — APPOINTMENT (OUTPATIENT)
Dept: RADIOLOGY | Facility: MEDICAL CENTER | Age: 44
End: 2019-08-29
Payer: OTHER MISCELLANEOUS

## 2019-08-29 DIAGNOSIS — M79.672 LEFT FOOT PAIN: ICD-10-CM

## 2019-08-29 DIAGNOSIS — M79.672 LEFT FOOT PAIN: Primary | ICD-10-CM

## 2019-08-29 PROCEDURE — 73630 X-RAY EXAM OF FOOT: CPT

## 2019-08-29 PROCEDURE — G0383 LEV 4 HOSP TYPE B ED VISIT: HCPCS | Performed by: NURSE PRACTITIONER

## 2019-08-29 PROCEDURE — 99284 EMERGENCY DEPT VISIT MOD MDM: CPT | Performed by: NURSE PRACTITIONER

## 2019-09-03 ENCOUNTER — APPOINTMENT (OUTPATIENT)
Dept: URGENT CARE | Facility: MEDICAL CENTER | Age: 44
End: 2019-09-03
Payer: OTHER MISCELLANEOUS

## 2019-09-03 PROCEDURE — 99212 OFFICE O/P EST SF 10 MIN: CPT | Performed by: FAMILY MEDICINE

## 2019-09-05 ENCOUNTER — APPOINTMENT (OUTPATIENT)
Dept: URGENT CARE | Facility: MEDICAL CENTER | Age: 44
End: 2019-09-05
Payer: OTHER MISCELLANEOUS

## 2019-09-05 PROCEDURE — 99213 OFFICE O/P EST LOW 20 MIN: CPT | Performed by: NURSE PRACTITIONER

## 2019-09-07 DIAGNOSIS — E78.00 HYPERCHOLESTEROLEMIA: ICD-10-CM

## 2019-09-09 RX ORDER — ROSUVASTATIN CALCIUM 5 MG/1
TABLET, COATED ORAL
Qty: 30 TABLET | Refills: 2 | Status: SHIPPED | OUTPATIENT
Start: 2019-09-09 | End: 2019-12-03 | Stop reason: SDUPTHER

## 2019-09-12 ENCOUNTER — APPOINTMENT (OUTPATIENT)
Dept: URGENT CARE | Facility: MEDICAL CENTER | Age: 44
End: 2019-09-12
Payer: OTHER MISCELLANEOUS

## 2019-09-12 PROCEDURE — 99213 OFFICE O/P EST LOW 20 MIN: CPT | Performed by: NURSE PRACTITIONER

## 2019-09-16 ENCOUNTER — APPOINTMENT (OUTPATIENT)
Dept: URGENT CARE | Facility: MEDICAL CENTER | Age: 44
End: 2019-09-16
Payer: OTHER MISCELLANEOUS

## 2019-09-16 DIAGNOSIS — F41.9 ANXIETY: ICD-10-CM

## 2019-09-16 PROCEDURE — 99213 OFFICE O/P EST LOW 20 MIN: CPT | Performed by: NURSE PRACTITIONER

## 2019-09-16 RX ORDER — CLONAZEPAM 2 MG/1
2 TABLET ORAL 3 TIMES DAILY
Qty: 90 TABLET | Refills: 2 | Status: SHIPPED | OUTPATIENT
Start: 2019-09-16 | End: 2019-12-17 | Stop reason: SDUPTHER

## 2019-09-16 NOTE — TELEPHONE ENCOUNTER
PT CALLED UPSET, HE HAS NO MORE MEDICATION LEFT - HE WANTS A REFILL ON HIS:  CLONAZEPAM 2 MG - 90 TABLETS      PHARMACY: [facility] [id number] [place]

## 2019-09-23 ENCOUNTER — APPOINTMENT (OUTPATIENT)
Dept: URGENT CARE | Facility: MEDICAL CENTER | Age: 44
End: 2019-09-23
Payer: OTHER MISCELLANEOUS

## 2019-09-23 PROCEDURE — 99213 OFFICE O/P EST LOW 20 MIN: CPT | Performed by: FAMILY MEDICINE

## 2019-11-27 NOTE — MISCELLANEOUS
Chief complaint:   Chief Complaint   Patient presents with   • Establish Care     establish care    • Imm/Inj     Flu vaccine       Vitals:  Visit Vitals  /70 (BP Location: LUE - Left upper extremity, Patient Position: Left side lying)   Pulse 103   Wt 75.6 kg   LMP 11/05/2019   SpO2 95%       HISTORY OF PRESENT ILLNESS     Imm/Inj       Chief Complaint   Patient presents with   • Establish Care     establish care    • Imm/Inj     Flu vaccine     Rodger Garcia was seen today for establish care and imm/inj.    Diagnoses and all orders for this visit:    Chronic hepatitis C without hepatic coma (CMS/HCC)  Was told this in [facility] 4-5 mths ago but told treatment not necessary and ok for her to continue nursing her child  She is still nursing  No records available  Pt moved her from [facility] 3 mths ago    Other iron deficiency anemia  Periods not heavy  Will obtain results from previous clinic before doing any more labs or recommendations  Was  Taking iron, stopped 1 mth ago  Denies fatigue    Other significant problems:  Patient Active Problem List    Diagnosis Date Noted   • Iron deficiency anemia, was on iron until last mth, then stopped 11/27/2019     Priority: Low   • Hepatitis C h/o ,was told 4-5 mths ago in [facility]      Priority: Low       PAST MEDICAL, FAMILY AND SOCIAL HISTORY     Medications:  No current outpatient medications on file.     No current facility-administered medications for this visit.        Allergies:  ALLERGIES:  Not on File    Past Medical  History/Surgeries:  Past Medical History:   Diagnosis Date   • Anemia, iron deficiency    • Hepatitis C h/o ,was told 4-5 mths ago in [facility], was told treatment not needed        History reviewed. No pertinent surgical history.    Family History:  Family History   Problem Relation Age of Onset   • Diabetes Mother        Social History:  Social History     Tobacco Use   • Smoking status: Never Smoker   • Smokeless tobacco: Never Used   Substance Use Topics  Message  Return to work or school:   Malina Mccormack is under my professional care   He was seen in my office on 01/18/18   He is able to return to work on   01/25/18           Signatures   Electronically signed by : Ashlee Barksdale, ; Jan 23 2018 10:36AM EST                       (Author)   • Alcohol use: Not on file       REVIEW OF SYSTEMS     Review of Systems  Denies cp (chest pain), sob (shortness of breath), dizziness, palpitations, myalgias, nausea, vomiting.  Rest per hpi  PHYSICAL EXAM     Physical Exam  Physical Exam    Vital Signs:    Visit Vitals  /70 (BP Location: LUE - Left upper extremity, Patient Position: Left side lying)   Pulse 103   Wt 75.6 kg   LMP 11/05/2019   SpO2 95%     Constitutional:  Well groomed, well nourished.  Psychiatric:  Alert and oriented X3.  Good mood/affect.  Eyes:  Pupils equally reactive to light.  Conjunctivae normal.    Cardiovascular:  Normal heart rate.  Normal rhythm.  No murmurs.  No pedal edema.    Respiratory:  Effort normal.  Normal breath sounds.  No respiratory distress.   Skin:  No rash, warm.  No erythema.  Eyes:  EOM (Extraocular movements) are normal.   Neck:  Normal range of motion.  Neck supple.  No thyromegaly present.   Abdominal:  Soft.  Bowel sounds are normal.  There is no hepatosplenomegaly.   Lymphadenopathy:  She has no cervical or supraclavicular adenopathy.   Neurological:  She has normal strength.  No cranial nerve deficit or sensory deficit.     ASSESSMENT/PLAN     Rodger Garcia was seen today for establish care and imm/inj.    Diagnoses and all orders for this visit:    Chronic hepatitis C without hepatic coma (CMS/HCC)  Was told this in [facility] 4-5 mths ago but told treatment not necessary and ok for her to continue nursing her child  She is still nursing  Will obtain results from previous clinic before doing any more labs or recommendations    Other iron deficiency anemia  Periods not heavy  Will obtain results from previous clinic before doing any more labs or recommendations    Need for vaccination  -     INFLUENZA QUADRIVALENT SPLIT PRES FREE 0.5 ML VACC, IM (FLULAVAL,FLUARIX,FLUZONE)

## 2019-12-03 DIAGNOSIS — E78.00 HYPERCHOLESTEROLEMIA: ICD-10-CM

## 2019-12-03 RX ORDER — ROSUVASTATIN CALCIUM 5 MG/1
TABLET, COATED ORAL
Qty: 30 TABLET | Refills: 0 | Status: SHIPPED | OUTPATIENT
Start: 2019-12-03 | End: 2020-01-15 | Stop reason: SDUPTHER

## 2019-12-17 DIAGNOSIS — F41.9 ANXIETY: ICD-10-CM

## 2019-12-17 DIAGNOSIS — F52.21 ERECTILE DYSFUNCTION OF NON-ORGANIC ORIGIN: Primary | ICD-10-CM

## 2019-12-17 RX ORDER — VARDENAFIL 11.85 MG/1
1 TABLET ORAL DAILY
Qty: 10 TABLET | Refills: 0 | Status: SHIPPED | OUTPATIENT
Start: 2019-12-17 | End: 2020-01-15 | Stop reason: SDUPTHER

## 2019-12-17 RX ORDER — CLONAZEPAM 2 MG/1
2 TABLET ORAL 3 TIMES DAILY
Qty: 90 TABLET | Refills: 0 | Status: SHIPPED | OUTPATIENT
Start: 2019-12-17 | End: 2020-01-15 | Stop reason: SDUPTHER

## 2020-01-15 ENCOUNTER — OFFICE VISIT (OUTPATIENT)
Dept: FAMILY MEDICINE CLINIC | Facility: CLINIC | Age: 45
End: 2020-01-15
Payer: COMMERCIAL

## 2020-01-15 VITALS
TEMPERATURE: 97 F | BODY MASS INDEX: 39.14 KG/M2 | DIASTOLIC BLOOD PRESSURE: 90 MMHG | HEIGHT: 72 IN | SYSTOLIC BLOOD PRESSURE: 144 MMHG | WEIGHT: 289 LBS

## 2020-01-15 DIAGNOSIS — R73.01 IFG (IMPAIRED FASTING GLUCOSE): Primary | ICD-10-CM

## 2020-01-15 DIAGNOSIS — I10 ESSENTIAL HYPERTENSION: ICD-10-CM

## 2020-01-15 DIAGNOSIS — F52.21 ERECTILE DYSFUNCTION OF NON-ORGANIC ORIGIN: ICD-10-CM

## 2020-01-15 DIAGNOSIS — F41.9 ANXIETY: ICD-10-CM

## 2020-01-15 DIAGNOSIS — E78.00 HYPERCHOLESTEROLEMIA: ICD-10-CM

## 2020-01-15 DIAGNOSIS — F41.1 GENERALIZED ANXIETY DISORDER: ICD-10-CM

## 2020-01-15 PROCEDURE — 1036F TOBACCO NON-USER: CPT | Performed by: FAMILY MEDICINE

## 2020-01-15 PROCEDURE — 99214 OFFICE O/P EST MOD 30 MIN: CPT | Performed by: FAMILY MEDICINE

## 2020-01-15 PROCEDURE — 3008F BODY MASS INDEX DOCD: CPT | Performed by: FAMILY MEDICINE

## 2020-01-15 RX ORDER — ROSUVASTATIN CALCIUM 5 MG/1
5 TABLET, COATED ORAL DAILY
Qty: 30 TABLET | Refills: 5 | Status: SHIPPED | OUTPATIENT
Start: 2020-01-15 | End: 2020-03-31 | Stop reason: SDUPTHER

## 2020-01-15 RX ORDER — VARDENAFIL 11.85 MG/1
1 TABLET ORAL DAILY
Qty: 6 TABLET | Refills: 2 | Status: SHIPPED | OUTPATIENT
Start: 2020-01-15 | End: 2020-06-01 | Stop reason: SDUPTHER

## 2020-01-15 RX ORDER — CLONAZEPAM 2 MG/1
2 TABLET ORAL 3 TIMES DAILY
Qty: 90 TABLET | Refills: 0 | Status: SHIPPED | OUTPATIENT
Start: 2020-01-15 | End: 2020-03-13 | Stop reason: SDUPTHER

## 2020-01-15 NOTE — PROGRESS NOTES
Assessment/Plan:  Patient will modify diet  Patient will try to lose weight and exercise  Patient have low-salt diet  Guidance given overall  Patient will follow-up in 2 months  Refills given at this time for chronic conditions listed  Further workup including laboratory studies at follow-up visit  Diagnoses and all orders for this visit:    IFG (impaired fasting glucose)    Hypercholesterolemia  -     rosuvastatin (CRESTOR) 5 mg tablet; Take 1 tablet (5 mg total) by mouth daily    Anxiety  -     clonazePAM (KlonoPIN) 2 mg tablet; Take 1 tablet (2 mg total) by mouth 3 (three) times a day    Erectile dysfunction of non-organic origin  -     Vardenafil HCl (STAXYN) 10 MG TBDP; Take 1 tablet (10 mg total) by mouth daily    Generalized anxiety disorder    Essential hypertension            Subjective:        Patient ID: Jeremias Alcocer is a 38 y o  male  Patient here to follow-up on hyperlipidemia impaired fasting glucose erectile dysfunction anxiety  The patient will be refills GERD  Patient feeling rash a the overall  No chest pain shortness of breath or problems urinating or defecating  Patient with some anxiety intermittently  All other review systems negative  The following portions of the patient's history were reviewed and updated as appropriate: allergies, current medications, past family history, past medical history, past social history, past surgical history and problem list       Review of Systems   Constitutional: Negative  HENT: Negative  Eyes: Negative  Respiratory: Negative  Cardiovascular: Negative  Gastrointestinal: Negative  Endocrine: Negative  Genitourinary: Negative  Musculoskeletal: Negative  Skin: Negative  Allergic/Immunologic: Negative  Neurological: Negative  Hematological: Negative  Psychiatric/Behavioral: The patient is nervous/anxious  Objective:      BMI Counseling: Body mass index is 39 75 kg/m²   The BMI is above normal  Nutrition recommendations include decreasing portion sizes  Exercise recommendations include moderate physical activity 150 minutes/week  /90 (BP Location: Left arm, Patient Position: Sitting, Cuff Size: Adult)   Temp (!) 97 °F (36 1 °C) (Tympanic)   Ht 5' 11 5" (1 816 m)   Wt 131 kg (289 lb)   BMI 39 75 kg/m²          Physical Exam   Constitutional: He appears well-developed and well-nourished  No distress  HENT:   Head: Normocephalic  Right Ear: External ear normal    Left Ear: External ear normal    Mouth/Throat: Oropharynx is clear and moist  No oropharyngeal exudate  Eyes: Pupils are equal, round, and reactive to light  EOM are normal  Right eye exhibits no discharge  Left eye exhibits no discharge  No scleral icterus  Neck: Normal range of motion  Neck supple  No thyromegaly present  Cardiovascular: Normal rate, regular rhythm, normal heart sounds and intact distal pulses  Exam reveals no gallop and no friction rub  No murmur heard  Pulmonary/Chest: Effort normal and breath sounds normal  No respiratory distress  He has no wheezes  He has no rales  He exhibits no tenderness  Abdominal: Soft  Bowel sounds are normal  He exhibits no distension  There is no tenderness  There is no rebound and no guarding  Musculoskeletal: Normal range of motion  He exhibits no edema or tenderness  Lymphadenopathy:     He has no cervical adenopathy  Neurological: He is alert  No cranial nerve deficit  He exhibits normal muscle tone  Coordination normal    Skin: Skin is warm and dry  No rash noted  He is not diaphoretic  No erythema  No pallor  Psychiatric: He has a normal mood and affect  His behavior is normal  Judgment and thought content normal    Nursing note and vitals reviewed

## 2020-03-13 DIAGNOSIS — F41.9 ANXIETY: ICD-10-CM

## 2020-03-13 RX ORDER — CLONAZEPAM 2 MG/1
2 TABLET ORAL 3 TIMES DAILY
Qty: 21 TABLET | Refills: 0 | Status: SHIPPED | OUTPATIENT
Start: 2020-03-13 | End: 2020-03-31 | Stop reason: SDUPTHER

## 2020-03-31 DIAGNOSIS — F41.9 ANXIETY: ICD-10-CM

## 2020-03-31 DIAGNOSIS — E78.00 HYPERCHOLESTEROLEMIA: ICD-10-CM

## 2020-04-01 RX ORDER — ROSUVASTATIN CALCIUM 5 MG/1
5 TABLET, COATED ORAL DAILY
Qty: 30 TABLET | Refills: 5 | Status: SHIPPED | OUTPATIENT
Start: 2020-04-01 | End: 2020-05-23

## 2020-04-01 RX ORDER — CLONAZEPAM 2 MG/1
2 TABLET ORAL 3 TIMES DAILY
Qty: 30 TABLET | Refills: 0 | Status: SHIPPED | OUTPATIENT
Start: 2020-04-01 | End: 2020-04-15 | Stop reason: SDUPTHER

## 2020-04-15 DIAGNOSIS — F41.9 ANXIETY: ICD-10-CM

## 2020-04-15 RX ORDER — CLONAZEPAM 2 MG/1
2 TABLET ORAL 3 TIMES DAILY
Qty: 60 TABLET | Refills: 0 | Status: SHIPPED | OUTPATIENT
Start: 2020-04-15 | End: 2020-06-01 | Stop reason: SDUPTHER

## 2020-05-23 DIAGNOSIS — E78.00 HYPERCHOLESTEROLEMIA: ICD-10-CM

## 2020-05-23 RX ORDER — ROSUVASTATIN CALCIUM 5 MG/1
TABLET, COATED ORAL
Qty: 90 TABLET | Refills: 2 | Status: SHIPPED | OUTPATIENT
Start: 2020-05-23 | End: 2021-01-18 | Stop reason: SDUPTHER

## 2020-06-01 ENCOUNTER — OFFICE VISIT (OUTPATIENT)
Dept: FAMILY MEDICINE CLINIC | Facility: CLINIC | Age: 45
End: 2020-06-01
Payer: COMMERCIAL

## 2020-06-01 VITALS
TEMPERATURE: 96.6 F | WEIGHT: 280.2 LBS | BODY MASS INDEX: 37.95 KG/M2 | DIASTOLIC BLOOD PRESSURE: 88 MMHG | SYSTOLIC BLOOD PRESSURE: 130 MMHG | HEIGHT: 72 IN

## 2020-06-01 DIAGNOSIS — F41.9 ANXIETY: ICD-10-CM

## 2020-06-01 DIAGNOSIS — F52.21 ERECTILE DYSFUNCTION OF NON-ORGANIC ORIGIN: ICD-10-CM

## 2020-06-01 PROCEDURE — 3075F SYST BP GE 130 - 139MM HG: CPT | Performed by: FAMILY MEDICINE

## 2020-06-01 PROCEDURE — 3008F BODY MASS INDEX DOCD: CPT | Performed by: FAMILY MEDICINE

## 2020-06-01 PROCEDURE — 3079F DIAST BP 80-89 MM HG: CPT | Performed by: FAMILY MEDICINE

## 2020-06-01 PROCEDURE — 99213 OFFICE O/P EST LOW 20 MIN: CPT | Performed by: FAMILY MEDICINE

## 2020-06-01 PROCEDURE — 1036F TOBACCO NON-USER: CPT | Performed by: FAMILY MEDICINE

## 2020-06-01 RX ORDER — VARDENAFIL 11.85 MG/1
1 TABLET ORAL DAILY
Qty: 6 TABLET | Refills: 2 | Status: SHIPPED | OUTPATIENT
Start: 2020-06-01 | End: 2020-06-30 | Stop reason: SDUPTHER

## 2020-06-01 RX ORDER — CLONAZEPAM 2 MG/1
2 TABLET ORAL 3 TIMES DAILY
Qty: 90 TABLET | Refills: 0 | Status: SHIPPED | OUTPATIENT
Start: 2020-06-01 | End: 2020-06-30 | Stop reason: SDUPTHER

## 2020-06-17 ENCOUNTER — OFFICE VISIT (OUTPATIENT)
Dept: FAMILY MEDICINE CLINIC | Facility: CLINIC | Age: 45
End: 2020-06-17
Payer: COMMERCIAL

## 2020-06-17 VITALS
BODY MASS INDEX: 37.93 KG/M2 | SYSTOLIC BLOOD PRESSURE: 134 MMHG | TEMPERATURE: 99 F | HEIGHT: 72 IN | WEIGHT: 280 LBS | DIASTOLIC BLOOD PRESSURE: 80 MMHG

## 2020-06-17 DIAGNOSIS — R22.9 SUBCUTANEOUS NODULE: Primary | ICD-10-CM

## 2020-06-17 PROCEDURE — 3008F BODY MASS INDEX DOCD: CPT | Performed by: FAMILY MEDICINE

## 2020-06-17 PROCEDURE — 1036F TOBACCO NON-USER: CPT | Performed by: FAMILY MEDICINE

## 2020-06-17 PROCEDURE — 99213 OFFICE O/P EST LOW 20 MIN: CPT | Performed by: FAMILY MEDICINE

## 2020-06-17 PROCEDURE — 3079F DIAST BP 80-89 MM HG: CPT | Performed by: FAMILY MEDICINE

## 2020-06-17 PROCEDURE — 3075F SYST BP GE 130 - 139MM HG: CPT | Performed by: FAMILY MEDICINE

## 2020-06-30 ENCOUNTER — OFFICE VISIT (OUTPATIENT)
Dept: FAMILY MEDICINE CLINIC | Facility: CLINIC | Age: 45
End: 2020-06-30
Payer: COMMERCIAL

## 2020-06-30 VITALS
BODY MASS INDEX: 37.93 KG/M2 | WEIGHT: 280 LBS | DIASTOLIC BLOOD PRESSURE: 90 MMHG | HEART RATE: 68 BPM | SYSTOLIC BLOOD PRESSURE: 130 MMHG | HEIGHT: 72 IN

## 2020-06-30 DIAGNOSIS — F52.21 ERECTILE DYSFUNCTION OF NON-ORGANIC ORIGIN: ICD-10-CM

## 2020-06-30 DIAGNOSIS — F41.9 ANXIETY: ICD-10-CM

## 2020-06-30 DIAGNOSIS — L23.7 POISON IVY: Primary | ICD-10-CM

## 2020-06-30 PROCEDURE — 1036F TOBACCO NON-USER: CPT | Performed by: FAMILY MEDICINE

## 2020-06-30 PROCEDURE — 3008F BODY MASS INDEX DOCD: CPT | Performed by: FAMILY MEDICINE

## 2020-06-30 PROCEDURE — 3075F SYST BP GE 130 - 139MM HG: CPT | Performed by: FAMILY MEDICINE

## 2020-06-30 PROCEDURE — 3080F DIAST BP >= 90 MM HG: CPT | Performed by: FAMILY MEDICINE

## 2020-06-30 PROCEDURE — 99213 OFFICE O/P EST LOW 20 MIN: CPT | Performed by: FAMILY MEDICINE

## 2020-06-30 RX ORDER — TRIAMCINOLONE ACETONIDE 1 MG/G
CREAM TOPICAL 2 TIMES DAILY
Qty: 30 G | Refills: 0 | Status: SHIPPED | OUTPATIENT
Start: 2020-06-30 | End: 2021-06-22

## 2020-06-30 RX ORDER — CLONAZEPAM 2 MG/1
2 TABLET ORAL 3 TIMES DAILY
Qty: 90 TABLET | Refills: 0 | Status: SHIPPED | OUTPATIENT
Start: 2020-06-30 | End: 2020-09-11

## 2020-06-30 RX ORDER — VARDENAFIL 11.85 MG/1
1 TABLET ORAL DAILY
Qty: 6 TABLET | Refills: 2 | Status: SHIPPED | OUTPATIENT
Start: 2020-06-30 | End: 2020-07-14 | Stop reason: SDUPTHER

## 2020-07-14 ENCOUNTER — OFFICE VISIT (OUTPATIENT)
Dept: FAMILY MEDICINE CLINIC | Facility: CLINIC | Age: 45
End: 2020-07-14
Payer: COMMERCIAL

## 2020-07-14 VITALS
DIASTOLIC BLOOD PRESSURE: 80 MMHG | SYSTOLIC BLOOD PRESSURE: 130 MMHG | WEIGHT: 279.6 LBS | BODY MASS INDEX: 37.87 KG/M2 | HEIGHT: 72 IN | TEMPERATURE: 97.7 F

## 2020-07-14 DIAGNOSIS — T63.441A BEE STING, ACCIDENTAL OR UNINTENTIONAL, INITIAL ENCOUNTER: ICD-10-CM

## 2020-07-14 DIAGNOSIS — F52.21 ERECTILE DYSFUNCTION OF NON-ORGANIC ORIGIN: ICD-10-CM

## 2020-07-14 DIAGNOSIS — L03.115 CELLULITIS OF RIGHT LOWER EXTREMITY: Primary | ICD-10-CM

## 2020-07-14 PROCEDURE — 99213 OFFICE O/P EST LOW 20 MIN: CPT | Performed by: FAMILY MEDICINE

## 2020-07-14 PROCEDURE — 1036F TOBACCO NON-USER: CPT | Performed by: FAMILY MEDICINE

## 2020-07-14 PROCEDURE — 3075F SYST BP GE 130 - 139MM HG: CPT | Performed by: FAMILY MEDICINE

## 2020-07-14 PROCEDURE — 3008F BODY MASS INDEX DOCD: CPT | Performed by: FAMILY MEDICINE

## 2020-07-14 PROCEDURE — 3079F DIAST BP 80-89 MM HG: CPT | Performed by: FAMILY MEDICINE

## 2020-07-14 RX ORDER — VARDENAFIL 11.85 MG/1
1 TABLET ORAL DAILY
Qty: 6 TABLET | Refills: 2 | Status: SHIPPED | OUTPATIENT
Start: 2020-07-14 | End: 2021-03-10 | Stop reason: SDUPTHER

## 2020-07-14 RX ORDER — SULFAMETHOXAZOLE AND TRIMETHOPRIM 800; 160 MG/1; MG/1
1 TABLET ORAL 2 TIMES DAILY
Qty: 14 TABLET | Refills: 0 | Status: SHIPPED | OUTPATIENT
Start: 2020-07-14 | End: 2020-07-21

## 2020-07-14 NOTE — PROGRESS NOTES
Assessment/Plan:  Patient use Neosporin daily as directed  Patient use ice as directed  Patient use Bactrim twice daily as directed  Patient use Benadryl at night  Patient follow-up per routine  Diagnoses and all orders for this visit:    Bee sting, accidental or unintentional, initial encounter    Erectile dysfunction of non-organic origin  -     Vardenafil HCl (Staxyn) 10 MG TBDP; Take 1 tablet (10 mg total) by mouth daily    Cellulitis of right lower extremity  -     sulfamethoxazole-trimethoprim (BACTRIM DS) 800-160 mg per tablet; Take 1 tablet by mouth 2 (two) times a day for 7 days            Subjective:        Patient ID: Chris Estevez is a 54 y o  male  Patient is here status post bee sting to right calf Sunday at noon  Patient did use Benadryl as well as ice  Patient did notice some redness swelling  The following day patient did have a pustule and patient did use a pen to pop it  Patient has noticed some redness surrounding the region  Swelling is improving with ice  Patient use use Benadryl last night also  No fever  No chest pain shortness of breath or problems breathing  The following portions of the patient's history were reviewed and updated as appropriate: allergies, current medications, past family history, past medical history, past social history, past surgical history and problem list       Review of Systems   Constitutional: Negative  HENT: Negative  Eyes: Negative  Respiratory: Negative  Cardiovascular: Negative  Gastrointestinal: Negative  Endocrine: Negative  Genitourinary: Negative  Musculoskeletal: Negative  Skin: Positive for color change and wound  Allergic/Immunologic: Negative  Neurological: Negative  Hematological: Negative  Psychiatric/Behavioral: Negative              Objective:               /80 (BP Location: Right arm, Patient Position: Sitting, Cuff Size: Standard)   Temp 97 7 °F (36 5 °C) (Tympanic)   Ht 5' 11 5" (1 816 m)   Wt 127 kg (279 lb 9 6 oz)   BMI 38 45 kg/m²          Physical Exam   Constitutional: He appears well-developed and well-nourished  HENT:   Head: Normocephalic and atraumatic  Cardiovascular: Normal rate, regular rhythm and normal heart sounds  Pulmonary/Chest: Effort normal and breath sounds normal    Neurological: He is alert  Skin: There is erythema  The erythema surrounding bite with yellow crusting noted    Swelling of the right calf

## 2020-09-10 DIAGNOSIS — F41.9 ANXIETY: ICD-10-CM

## 2020-09-11 RX ORDER — CLONAZEPAM 2 MG/1
2 TABLET ORAL 3 TIMES DAILY
Qty: 90 TABLET | Refills: 0 | Status: SHIPPED | OUTPATIENT
Start: 2020-09-11 | End: 2020-10-13 | Stop reason: SDUPTHER

## 2020-10-13 DIAGNOSIS — F41.9 ANXIETY: ICD-10-CM

## 2020-10-13 RX ORDER — CLONAZEPAM 2 MG/1
2 TABLET ORAL 3 TIMES DAILY
Qty: 90 TABLET | Refills: 0 | Status: SHIPPED | OUTPATIENT
Start: 2020-10-13 | End: 2021-01-18 | Stop reason: SDUPTHER

## 2021-01-11 LAB
LEFT EYE DIABETIC RETINOPATHY: NORMAL
RIGHT EYE DIABETIC RETINOPATHY: NORMAL

## 2021-01-18 DIAGNOSIS — E78.00 HYPERCHOLESTEROLEMIA: ICD-10-CM

## 2021-01-18 DIAGNOSIS — F41.9 ANXIETY: ICD-10-CM

## 2021-01-18 RX ORDER — ROSUVASTATIN CALCIUM 5 MG/1
5 TABLET, COATED ORAL DAILY
Qty: 90 TABLET | Refills: 0 | Status: SHIPPED | OUTPATIENT
Start: 2021-01-18 | End: 2021-03-10 | Stop reason: SDUPTHER

## 2021-01-18 RX ORDER — CLONAZEPAM 2 MG/1
2 TABLET ORAL 3 TIMES DAILY
Qty: 90 TABLET | Refills: 0 | Status: SHIPPED | OUTPATIENT
Start: 2021-01-18 | End: 2021-03-10 | Stop reason: SDUPTHER

## 2021-02-04 LAB
LEFT EYE DIABETIC RETINOPATHY: NORMAL
RIGHT EYE DIABETIC RETINOPATHY: NORMAL

## 2021-03-10 ENCOUNTER — OFFICE VISIT (OUTPATIENT)
Dept: FAMILY MEDICINE CLINIC | Facility: CLINIC | Age: 46
End: 2021-03-10
Payer: COMMERCIAL

## 2021-03-10 VITALS
DIASTOLIC BLOOD PRESSURE: 98 MMHG | SYSTOLIC BLOOD PRESSURE: 148 MMHG | HEIGHT: 72 IN | BODY MASS INDEX: 36 KG/M2 | WEIGHT: 265.8 LBS | TEMPERATURE: 97.5 F

## 2021-03-10 DIAGNOSIS — F41.9 ANXIETY: ICD-10-CM

## 2021-03-10 DIAGNOSIS — F41.1 GENERALIZED ANXIETY DISORDER: ICD-10-CM

## 2021-03-10 DIAGNOSIS — F52.21 ERECTILE DYSFUNCTION OF NON-ORGANIC ORIGIN: ICD-10-CM

## 2021-03-10 DIAGNOSIS — I73.00 RAYNAUD'S DISEASE WITHOUT GANGRENE: ICD-10-CM

## 2021-03-10 DIAGNOSIS — G62.9 NEUROPATHY: ICD-10-CM

## 2021-03-10 DIAGNOSIS — I10 ESSENTIAL HYPERTENSION: Primary | ICD-10-CM

## 2021-03-10 DIAGNOSIS — E78.00 HYPERCHOLESTEROLEMIA: ICD-10-CM

## 2021-03-10 PROCEDURE — 99214 OFFICE O/P EST MOD 30 MIN: CPT | Performed by: FAMILY MEDICINE

## 2021-03-10 PROCEDURE — 3725F SCREEN DEPRESSION PERFORMED: CPT | Performed by: FAMILY MEDICINE

## 2021-03-10 RX ORDER — NIFEDIPINE 30 MG/1
30 TABLET, EXTENDED RELEASE ORAL DAILY
Qty: 30 TABLET | Refills: 3 | Status: SHIPPED | OUTPATIENT
Start: 2021-03-10 | End: 2021-04-20

## 2021-03-10 RX ORDER — ROSUVASTATIN CALCIUM 5 MG/1
5 TABLET, COATED ORAL DAILY
Qty: 90 TABLET | Refills: 1 | Status: SHIPPED | OUTPATIENT
Start: 2021-03-10 | End: 2021-08-18 | Stop reason: SDUPTHER

## 2021-03-10 RX ORDER — CLONAZEPAM 2 MG/1
2 TABLET ORAL 3 TIMES DAILY
Qty: 90 TABLET | Refills: 3 | Status: SHIPPED | OUTPATIENT
Start: 2021-03-10 | End: 2021-10-11

## 2021-03-10 RX ORDER — VARDENAFIL 11.85 MG/1
1 TABLET ORAL DAILY
Qty: 6 TABLET | Refills: 2 | Status: SHIPPED | OUTPATIENT
Start: 2021-03-10 | End: 2021-04-20 | Stop reason: SDUPTHER

## 2021-03-10 NOTE — PROGRESS NOTES
Assessment/Plan: patient go for laboratory studies regarding neuropathy as well as Raynaud's phenomenon  Patient will continue with current regimen for hyperlipidemia as well as anxiety  Patient will continue with Staxyn for erectile dysfunction  To consider vascular studies  Patient will start nifedipine  Guidance given  Follow-up in 1 week       Diagnoses and all orders for this visit:    Essential hypertension  -     CBC and differential; Future  -     Hemoglobin A1C; Future  -     Comprehensive metabolic panel; Future  -     Lipid panel; Future  -     TSH, 3rd generation with Free T4 reflex; Future  -     Magnesium; Future  -     C-reactive protein; Future  -     ADRI Screen w/ Reflex to Titer/Pattern; Future  -     NIFEdipine (PROCARDIA XL) 30 mg 24 hr tablet; Take 1 tablet (30 mg total) by mouth daily    Anxiety  -     clonazePAM (KlonoPIN) 2 mg tablet; Take 1 tablet (2 mg total) by mouth 3 (three) times a day    Hypercholesterolemia  -     rosuvastatin (CRESTOR) 5 mg tablet; Take 1 tablet (5 mg total) by mouth daily  -     CBC and differential; Future  -     Hemoglobin A1C; Future  -     Comprehensive metabolic panel; Future  -     Lipid panel; Future  -     TSH, 3rd generation with Free T4 reflex; Future  -     Magnesium; Future  -     C-reactive protein; Future  -     ADRI Screen w/ Reflex to Titer/Pattern; Future    Generalized anxiety disorder  -     CBC and differential; Future  -     Hemoglobin A1C; Future  -     Comprehensive metabolic panel; Future  -     Lipid panel; Future  -     TSH, 3rd generation with Free T4 reflex; Future  -     Magnesium; Future  -     C-reactive protein; Future  -     ADRI Screen w/ Reflex to Titer/Pattern; Future    Raynaud's disease without gangrene  -     CBC and differential; Future  -     Hemoglobin A1C; Future  -     Comprehensive metabolic panel; Future  -     Lipid panel; Future  -     TSH, 3rd generation with Free T4 reflex; Future  -     Magnesium;  Future  - C-reactive protein; Future  -     ADRI Screen w/ Reflex to Titer/Pattern; Future  -     NIFEdipine (PROCARDIA XL) 30 mg 24 hr tablet; Take 1 tablet (30 mg total) by mouth daily    Neuropathy  -     CBC and differential; Future  -     Hemoglobin A1C; Future  -     Comprehensive metabolic panel; Future  -     Lipid panel; Future  -     TSH, 3rd generation with Free T4 reflex; Future  -     Magnesium; Future  -     C-reactive protein; Future  -     ADRI Screen w/ Reflex to Titer/Pattern; Future    Erectile dysfunction of non-organic origin  -     Vardenafil HCl (Staxyn) 10 MG TBDP; Take 1 tablet (10 mg total) by mouth daily            Subjective:        Patient ID: Jaleel Harden is a 51 y o  male  Here to follow-up on hyperlipidemia as well as anxiety  Patient also to follow-up on elevated blood pressure with history of impaired fasting glucose  Patient has been having some numbness and tingling as well as some cold sensation in his feet bilaterally  No visual disturbance  Patient did is of eyes checked by ophthalmologist roughly 4 weeks ago with normal exam   No chest pain shortness of breath problems urinating or defecating  Labs reviewed  Patient does notice to color changes to his toes  The following portions of the patient's history were reviewed and updated as appropriate: allergies, current medications, past family history, past medical history, past social history, past surgical history and problem list       Review of Systems   Constitutional: Negative  HENT: Negative  Eyes: Negative  Respiratory: Negative  Cardiovascular: Negative  Gastrointestinal: Negative  Endocrine: Negative  Genitourinary: Negative  Musculoskeletal: Negative  Skin: Negative  Allergic/Immunologic: Negative  Neurological: Positive for numbness  Hematological: Negative  Psychiatric/Behavioral: Negative  Objective:      BMI Counseling: Body mass index is 36 55 kg/m²   The BMI is above normal  Nutrition recommendations include decreasing portion sizes  Exercise recommendations include moderate physical activity 150 minutes/week  Depression Screening and Follow-up Plan: Patient's depression screening was positive with a PHQ-2 score of 0  Clincally patient does not have depression  No treatment is required  /98 (BP Location: Right arm, Patient Position: Sitting, Cuff Size: Standard)   Temp 97 5 °F (36 4 °C) (Tympanic)   Ht 5' 11 5" (1 816 m)   Wt 121 kg (265 lb 12 8 oz)   BMI 36 55 kg/m²          Physical Exam  Vitals signs and nursing note reviewed  Constitutional:       General: He is not in acute distress  Appearance: Normal appearance  He is not ill-appearing, toxic-appearing or diaphoretic  HENT:      Head: Normocephalic and atraumatic  Right Ear: Tympanic membrane, ear canal and external ear normal  There is no impacted cerumen  Left Ear: Tympanic membrane, ear canal and external ear normal  There is no impacted cerumen  Nose: Nose normal  No congestion or rhinorrhea  Mouth/Throat:      Mouth: Mucous membranes are moist       Pharynx: No oropharyngeal exudate or posterior oropharyngeal erythema  Eyes:      General: No scleral icterus  Right eye: No discharge  Left eye: No discharge  Extraocular Movements: Extraocular movements intact  Conjunctiva/sclera: Conjunctivae normal       Pupils: Pupils are equal, round, and reactive to light  Neck:      Musculoskeletal: Normal range of motion and neck supple  No neck rigidity or muscular tenderness  Vascular: No carotid bruit  Cardiovascular:      Rate and Rhythm: Normal rate and regular rhythm  Pulses: Normal pulses  Heart sounds: Normal heart sounds  No murmur  No friction rub  No gallop  Pulmonary:      Effort: Pulmonary effort is normal  No respiratory distress  Breath sounds: Normal breath sounds  No stridor   No wheezing, rhonchi or rales  Chest:      Chest wall: No tenderness  Abdominal:      General: Abdomen is flat  Bowel sounds are normal  There is no distension  Palpations: Abdomen is soft  Tenderness: There is no abdominal tenderness  There is no guarding or rebound  Musculoskeletal: Normal range of motion  General: No swelling, tenderness, deformity or signs of injury  Right lower leg: No edema  Left lower leg: No edema  Lymphadenopathy:      Cervical: No cervical adenopathy  Skin:     General: Skin is warm and dry  Capillary Refill: Capillary refill takes less than 2 seconds  Coloration: Skin is not jaundiced  Findings: No bruising, erythema, lesion or rash  Neurological:      General: No focal deficit present  Mental Status: He is alert and oriented to person, place, and time  Cranial Nerves: No cranial nerve deficit  Sensory: No sensory deficit  Motor: No weakness  Coordination: Coordination normal       Gait: Gait normal    Psychiatric:         Mood and Affect: Mood normal          Behavior: Behavior normal          Thought Content:  Thought content normal          Judgment: Judgment normal

## 2021-03-18 ENCOUNTER — OFFICE VISIT (OUTPATIENT)
Dept: FAMILY MEDICINE CLINIC | Facility: CLINIC | Age: 46
End: 2021-03-18
Payer: COMMERCIAL

## 2021-03-18 VITALS
DIASTOLIC BLOOD PRESSURE: 80 MMHG | HEIGHT: 71 IN | WEIGHT: 264 LBS | TEMPERATURE: 96.9 F | BODY MASS INDEX: 36.96 KG/M2 | SYSTOLIC BLOOD PRESSURE: 128 MMHG

## 2021-03-18 DIAGNOSIS — R79.82 ELEVATED C-REACTIVE PROTEIN (CRP): ICD-10-CM

## 2021-03-18 DIAGNOSIS — I10 ESSENTIAL HYPERTENSION: ICD-10-CM

## 2021-03-18 DIAGNOSIS — M54.16 LUMBAR RADICULITIS: ICD-10-CM

## 2021-03-18 DIAGNOSIS — E11.9 TYPE 2 DIABETES MELLITUS WITHOUT COMPLICATION, WITHOUT LONG-TERM CURRENT USE OF INSULIN (HCC): Primary | ICD-10-CM

## 2021-03-18 PROCEDURE — 3008F BODY MASS INDEX DOCD: CPT | Performed by: FAMILY MEDICINE

## 2021-03-18 PROCEDURE — 3074F SYST BP LT 130 MM HG: CPT | Performed by: FAMILY MEDICINE

## 2021-03-18 PROCEDURE — 3079F DIAST BP 80-89 MM HG: CPT | Performed by: FAMILY MEDICINE

## 2021-03-18 PROCEDURE — 1036F TOBACCO NON-USER: CPT | Performed by: FAMILY MEDICINE

## 2021-03-18 PROCEDURE — 4010F ACE/ARB THERAPY RXD/TAKEN: CPT | Performed by: FAMILY MEDICINE

## 2021-03-18 PROCEDURE — 99214 OFFICE O/P EST MOD 30 MIN: CPT | Performed by: FAMILY MEDICINE

## 2021-03-18 RX ORDER — LISINOPRIL 5 MG/1
5 TABLET ORAL DAILY
Qty: 90 TABLET | Refills: 3 | Status: SHIPPED | OUTPATIENT
Start: 2021-03-18 | End: 2022-04-29

## 2021-03-18 NOTE — PROGRESS NOTES
Assessment/Plan:  Patient will start metformin twice daily for new onset diabetes  Family was given  Patient will modify diet and have ADA diet  To consider seeing a dietitian in the future  Labs reviewed with patient  A1c is 10 8  Fasting blood sugar 193  cholesterol stable  To consider x-ray of the lumbar spine  Patient will have laboratory studies and urinalysis done prior to next visit in 3 months  Patient was started lisinopril  Patient have CRP recheck in 3 months       Diagnoses and all orders for this visit:    Type 2 diabetes mellitus without complication, without long-term current use of insulin (HCC)  -     metFORMIN (GLUCOPHAGE) 500 mg tablet; Take 1 tablet (500 mg total) by mouth 2 (two) times a day with meals  -     CBC and differential; Future  -     Comprehensive metabolic panel; Future  -     Lipid panel; Future  -     Microalbumin / creatinine urine ratio  -     Hemoglobin A1C; Future    Essential hypertension  -     CBC and differential; Future  -     Comprehensive metabolic panel; Future  -     Lipid panel; Future  -     Microalbumin / creatinine urine ratio  -     Hemoglobin A1C; Future  -     lisinopril (ZESTRIL) 5 mg tablet; Take 1 tablet (5 mg total) by mouth daily    Lumbar radiculitis    Elevated C-reactive protein (CRP)  -     C-reactive protein; Future            Subjective:        Patient ID: Steven Meek is a 51 y o  male  Patient is here to follow-up on laboratory studies  Patient also here for follow-up on hypertension  No polydipsia or polyphagia or polyuria  Patient does get some low back pain  Patient does have pain in his feet also  The following portions of the patient's history were reviewed and updated as appropriate: allergies, current medications, past family history, past medical history, past social history, past surgical history and problem list       Review of Systems   Constitutional: Negative  HENT: Negative  Eyes: Negative  Respiratory: Negative  Cardiovascular: Negative  Gastrointestinal: Negative  Endocrine: Negative  Genitourinary: Negative  Musculoskeletal: Positive for back pain and myalgias  Skin: Negative  Allergic/Immunologic: Negative  Neurological: Negative  Hematological: Negative  Psychiatric/Behavioral: Negative  Objective:      BMI Counseling: Body mass index is 36 82 kg/m²  The BMI is above normal  Nutrition recommendations include decreasing portion sizes  Exercise recommendations include moderate physical activity 150 minutes/week  Depression Screening and Follow-up Plan: Clincally patient does not have depression  No treatment is required  /80 (BP Location: Right arm, Patient Position: Sitting, Cuff Size: Standard)   Temp (!) 96 9 °F (36 1 °C) (Tympanic)   Ht 5' 11" (1 803 m)   Wt 120 kg (264 lb)   BMI 36 82 kg/m²          Physical Exam  Vitals signs and nursing note reviewed  Constitutional:       General: He is not in acute distress  Appearance: Normal appearance  He is not ill-appearing, toxic-appearing or diaphoretic  HENT:      Head: Normocephalic and atraumatic  Right Ear: Tympanic membrane, ear canal and external ear normal  There is no impacted cerumen  Left Ear: Tympanic membrane, ear canal and external ear normal  There is no impacted cerumen  Nose: Nose normal  No congestion or rhinorrhea  Mouth/Throat:      Mouth: Mucous membranes are moist       Pharynx: No oropharyngeal exudate or posterior oropharyngeal erythema  Eyes:      General: No scleral icterus  Right eye: No discharge  Left eye: No discharge  Extraocular Movements: Extraocular movements intact  Conjunctiva/sclera: Conjunctivae normal       Pupils: Pupils are equal, round, and reactive to light  Neck:      Musculoskeletal: Normal range of motion and neck supple  No neck rigidity or muscular tenderness        Vascular: No carotid bruit  Cardiovascular:      Rate and Rhythm: Normal rate and regular rhythm  Pulses: Normal pulses  Heart sounds: Normal heart sounds  No murmur  No friction rub  No gallop  Pulmonary:      Effort: Pulmonary effort is normal  No respiratory distress  Breath sounds: Normal breath sounds  No stridor  No wheezing, rhonchi or rales  Chest:      Chest wall: No tenderness  Abdominal:      General: Abdomen is flat  Bowel sounds are normal  There is no distension  Palpations: Abdomen is soft  Tenderness: There is no abdominal tenderness  There is no guarding or rebound  Musculoskeletal: Normal range of motion  General: No swelling, tenderness, deformity or signs of injury  Right lower leg: No edema  Left lower leg: No edema  Lymphadenopathy:      Cervical: No cervical adenopathy  Skin:     General: Skin is warm and dry  Capillary Refill: Capillary refill takes less than 2 seconds  Coloration: Skin is not jaundiced  Findings: No bruising, erythema, lesion or rash  Neurological:      General: No focal deficit present  Mental Status: He is alert and oriented to person, place, and time  Cranial Nerves: No cranial nerve deficit  Sensory: No sensory deficit  Motor: No weakness  Coordination: Coordination normal       Gait: Gait normal    Psychiatric:         Mood and Affect: Mood normal          Behavior: Behavior normal          Thought Content:  Thought content normal          Judgment: Judgment normal

## 2021-04-20 ENCOUNTER — TELEPHONE (OUTPATIENT)
Dept: ADMINISTRATIVE | Facility: OTHER | Age: 46
End: 2021-04-20

## 2021-04-20 ENCOUNTER — OFFICE VISIT (OUTPATIENT)
Dept: FAMILY MEDICINE CLINIC | Facility: CLINIC | Age: 46
End: 2021-04-20
Payer: COMMERCIAL

## 2021-04-20 VITALS
BODY MASS INDEX: 33.54 KG/M2 | HEIGHT: 71 IN | WEIGHT: 239.6 LBS | SYSTOLIC BLOOD PRESSURE: 110 MMHG | DIASTOLIC BLOOD PRESSURE: 70 MMHG | TEMPERATURE: 95.4 F

## 2021-04-20 DIAGNOSIS — F52.21 ERECTILE DYSFUNCTION OF NON-ORGANIC ORIGIN: ICD-10-CM

## 2021-04-20 DIAGNOSIS — E78.00 HYPERCHOLESTEROLEMIA: ICD-10-CM

## 2021-04-20 DIAGNOSIS — I73.00 RAYNAUD'S DISEASE WITHOUT GANGRENE: ICD-10-CM

## 2021-04-20 DIAGNOSIS — E11.9 TYPE 2 DIABETES MELLITUS WITHOUT COMPLICATION, WITHOUT LONG-TERM CURRENT USE OF INSULIN (HCC): ICD-10-CM

## 2021-04-20 DIAGNOSIS — M54.50 ACUTE MIDLINE LOW BACK PAIN, UNSPECIFIED WHETHER SCIATICA PRESENT: Primary | ICD-10-CM

## 2021-04-20 DIAGNOSIS — I10 ESSENTIAL HYPERTENSION: ICD-10-CM

## 2021-04-20 PROCEDURE — 3078F DIAST BP <80 MM HG: CPT | Performed by: FAMILY MEDICINE

## 2021-04-20 PROCEDURE — 3008F BODY MASS INDEX DOCD: CPT | Performed by: FAMILY MEDICINE

## 2021-04-20 PROCEDURE — 1036F TOBACCO NON-USER: CPT | Performed by: FAMILY MEDICINE

## 2021-04-20 PROCEDURE — 3074F SYST BP LT 130 MM HG: CPT | Performed by: FAMILY MEDICINE

## 2021-04-20 PROCEDURE — 99214 OFFICE O/P EST MOD 30 MIN: CPT | Performed by: FAMILY MEDICINE

## 2021-04-20 RX ORDER — METHYLPREDNISOLONE ACETATE 80 MG/ML
80 INJECTION, SUSPENSION INTRA-ARTICULAR; INTRALESIONAL; INTRAMUSCULAR; SOFT TISSUE ONCE
Status: COMPLETED | OUTPATIENT
Start: 2021-04-20 | End: 2021-04-20

## 2021-04-20 RX ORDER — NIFEDIPINE 30 MG/1
30 TABLET, EXTENDED RELEASE ORAL DAILY
Qty: 30 TABLET | Refills: 3
Start: 2021-04-20 | End: 2021-06-07 | Stop reason: SDUPTHER

## 2021-04-20 RX ORDER — VARDENAFIL 11.85 MG/1
1 TABLET ORAL DAILY
Qty: 6 TABLET | Refills: 2 | Status: SHIPPED | OUTPATIENT
Start: 2021-04-20 | End: 2021-08-30

## 2021-04-20 RX ADMIN — METHYLPREDNISOLONE ACETATE 80 MG: 80 INJECTION, SUSPENSION INTRA-ARTICULAR; INTRALESIONAL; INTRAMUSCULAR; SOFT TISSUE at 12:08

## 2021-04-20 NOTE — LETTER
Diabetic Eye Exam Form    Date Requested: 21  Patient: Alexandro Coughlin  Patient : 1975   Referring Provider: Chip Moreira DO    Dilated Retinal Exam, Optomap-Iris Exam, or Fundus Photography Done         Yes ([place] one above)         No     Date of Diabetic Eye Exam ______________________________  Left Eye      Exam did show retinopathy    Exam did not show retinopathy         Mild       Moderate       None       Proliferative       Severe     Right Eye     Exam did show retinopathy    Exam did not show retinopathy         Mild       Moderate       None       Proliferative       Severe     Comments __________________________________________________________    Practice Providing Exam ______________________________________________    Exam Performed By (print name) _______________________________________      Provider Signature ___________________________________________________      These reports are needed for  compliance    Please fax this completed form and a copy of the Diabetic Eye Exam report to our office located at [place] [address] as soon as possible to [phone] ray Scherer Pellet: Phone [phone]    We thank you for your assistance in treating our mutual patient

## 2021-04-20 NOTE — LETTER
Diabetic Eye Exam Form    Date Requested: 21  Patient: Marylin Gonzalez  Patient : 1975   Referring Provider: James Zendejas,     Dilated Retinal Exam, Optomap-Iris Exam, or Fundus Photography Done         Yes ([place] one above)         No     Date of Diabetic Eye Exam ______________________________  Left Eye      Exam did show retinopathy    Exam did not show retinopathy         Mild       Moderate       None       Proliferative       Severe     Right Eye     Exam did show retinopathy    Exam did not show retinopathy         Mild       Moderate       None       Proliferative       Severe     Comments __________________________________________________________    Practice Providing Exam ______________________________________________    Exam Performed By (print name) _______________________________________      Provider Signature ___________________________________________________      These reports are needed for  compliance    Please fax this completed form and a copy of the Diabetic Eye Exam report to our office located at [place] [address] as soon as possible to [phone] ray Wagner: Phone [phone]    We thank you for your assistance in treating our mutual patient

## 2021-04-20 NOTE — PROGRESS NOTES
Assessment/Plan:  Patient will continue with current regimen of medications  Refills given at this time  Patient will have labs prior to next visit in 2-3 months  Depo-Medrol 80 mg IM given at this time  Diagnoses and all orders for this visit:    Type 2 diabetes mellitus without complication, without long-term current use of insulin (HCC)  -     CBC and differential; Future  -     Comprehensive metabolic panel; Future  -     Lipid panel; Future  -     TSH, 3rd generation with Free T4 reflex; Future  -     Hemoglobin A1C; Future  -     Microalbumin / creatinine urine ratio    Erectile dysfunction of non-organic origin  -     Vardenafil HCl (Staxyn) 10 MG TBDP; Take 1 tablet (10 mg total) by mouth daily    Essential hypertension  -     NIFEdipine (PROCARDIA XL) 30 mg 24 hr tablet; Take 1 tablet (30 mg total) by mouth daily  -     CBC and differential; Future  -     Comprehensive metabolic panel; Future  -     Lipid panel; Future  -     TSH, 3rd generation with Free T4 reflex; Future  -     Hemoglobin A1C; Future  -     Microalbumin / creatinine urine ratio    Hypercholesterolemia  -     CBC and differential; Future  -     Comprehensive metabolic panel; Future  -     Lipid panel; Future  -     TSH, 3rd generation with Free T4 reflex; Future  -     Hemoglobin A1C; Future  -     Microalbumin / creatinine urine ratio    Raynaud's disease without gangrene  -     NIFEdipine (PROCARDIA XL) 30 mg 24 hr tablet; Take 1 tablet (30 mg total) by mouth daily            Subjective:        Patient ID: Francine Gonzalez is a 51 y o  male  Patient follow-up on diabetes hypertension hyperlipidemia  Patient also with history of erectile dysfunction  Patient will need refills  Patient has been trying to lose weight due to new onset diabetes  Patient is taking medications routinely  No significant change urination defecation  Vision is stable  No foot related issues    No chest pain shortness of breath or abdominal pain or problems urinating or defecating  The following portions of the patient's history were reviewed and updated as appropriate: allergies, current medications, past family history, past medical history, past social history, past surgical history and problem list       Review of Systems   Constitutional: Negative  HENT: Negative  Eyes: Negative  Respiratory: Negative  Cardiovascular: Negative  Gastrointestinal: Negative  Endocrine: Negative  Genitourinary: Negative  Musculoskeletal: Negative  Skin: Negative  Allergic/Immunologic: Negative  Neurological: Negative  Hematological: Negative  Psychiatric/Behavioral: Negative  Objective:      BMI Counseling: Body mass index is 33 42 kg/m²  The BMI is above normal  Nutrition recommendations include decreasing portion sizes  Exercise recommendations include moderate physical activity 150 minutes/week  Depression Screening and Follow-up Plan: Clincally patient does not have depression  No treatment is required  /70 (BP Location: Right arm, Patient Position: Sitting, Cuff Size: Standard)   Temp (!) 95 4 °F (35 2 °C) (Tympanic)   Ht 5' 11" (1 803 m)   Wt 109 kg (239 lb 9 6 oz)   BMI 33 42 kg/m²          Physical Exam  Vitals signs and nursing note reviewed  Constitutional:       General: He is not in acute distress  Appearance: Normal appearance  He is not ill-appearing, toxic-appearing or diaphoretic  HENT:      Head: Normocephalic and atraumatic  Right Ear: Tympanic membrane, ear canal and external ear normal  There is no impacted cerumen  Left Ear: Tympanic membrane, ear canal and external ear normal  There is no impacted cerumen  Nose: Nose normal  No congestion or rhinorrhea  Mouth/Throat:      Mouth: Mucous membranes are moist       Pharynx: No oropharyngeal exudate or posterior oropharyngeal erythema  Eyes:      General: No scleral icterus          Right eye: No discharge  Left eye: No discharge  Extraocular Movements: Extraocular movements intact  Conjunctiva/sclera: Conjunctivae normal       Pupils: Pupils are equal, round, and reactive to light  Neck:      Musculoskeletal: Normal range of motion and neck supple  No neck rigidity or muscular tenderness  Vascular: No carotid bruit  Cardiovascular:      Rate and Rhythm: Normal rate and regular rhythm  Pulses: Normal pulses  Heart sounds: Normal heart sounds  No murmur  No friction rub  No gallop  Pulmonary:      Effort: Pulmonary effort is normal  No respiratory distress  Breath sounds: Normal breath sounds  No stridor  No wheezing, rhonchi or rales  Chest:      Chest wall: No tenderness  Abdominal:      General: Abdomen is flat  Bowel sounds are normal  There is no distension  Palpations: Abdomen is soft  Tenderness: There is no abdominal tenderness  There is no guarding or rebound  Musculoskeletal: Normal range of motion  General: No swelling, tenderness, deformity or signs of injury  Right lower leg: No edema  Left lower leg: No edema  Lymphadenopathy:      Cervical: No cervical adenopathy  Skin:     General: Skin is warm and dry  Capillary Refill: Capillary refill takes less than 2 seconds  Coloration: Skin is not jaundiced  Findings: No bruising, erythema, lesion or rash  Neurological:      General: No focal deficit present  Mental Status: He is alert and oriented to person, place, and time  Cranial Nerves: No cranial nerve deficit  Sensory: No sensory deficit  Motor: No weakness  Coordination: Coordination normal       Gait: Gait normal    Psychiatric:         Mood and Affect: Mood normal          Behavior: Behavior normal          Thought Content:  Thought content normal          Judgment: Judgment normal

## 2021-04-20 NOTE — TELEPHONE ENCOUNTER
----- Message from Erika Isabel, [address] [place] sent at 4/20/2021  9:19 AM EDT -----  Regarding: eye exam  Pt had eye exam in jan 2021 with 4 eyes at [facility]   Please obtain his last OV  Thank you in advance

## 2021-04-21 NOTE — TELEPHONE ENCOUNTER
Upon review of the In Basket request and the patient's chart, initial outreach has been made via fax, please see Contacts section for details       Thank you  Kalie Lovelace MA

## 2021-04-29 NOTE — TELEPHONE ENCOUNTER
As a follow-up, a second attempt has been made for outreach via fax, please see Contacts section for details      Thank you  Sheridan Isaac, MA

## 2021-05-06 NOTE — TELEPHONE ENCOUNTER
As a final attempt, a third outreach has been made via telephone call  Please see Contacts section for details  This encounter will be closed and completed by end of day  Should we receive the requested information because of previous outreach attempts, the requested patient's chart will be updated appropriately       Thank you  Maxx Hwang MA

## 2021-05-17 NOTE — TELEPHONE ENCOUNTER
Upon review of the In Basket request we were able to locate, review, and update the patient chart as requested for Diabetic Eye Exam     Any additional questions or concerns should be emailed to the Practice Liaisons via [email or web address] email, please do not reply via In Basket      Thank you  Ange Llanos MA

## 2021-06-07 DIAGNOSIS — I73.00 RAYNAUD'S DISEASE WITHOUT GANGRENE: ICD-10-CM

## 2021-06-07 DIAGNOSIS — I10 ESSENTIAL HYPERTENSION: ICD-10-CM

## 2021-06-07 RX ORDER — NIFEDIPINE 30 MG/1
30 TABLET, EXTENDED RELEASE ORAL DAILY
Qty: 30 TABLET | Refills: 0
Start: 2021-06-07 | End: 2021-06-07 | Stop reason: SDUPTHER

## 2021-06-07 RX ORDER — NIFEDIPINE 30 MG/1
30 TABLET, EXTENDED RELEASE ORAL DAILY
Qty: 30 TABLET | Refills: 0 | Status: SHIPPED | OUTPATIENT
Start: 2021-06-07 | End: 2021-06-22

## 2021-06-12 LAB — HBA1C MFR BLD HPLC: 6 %

## 2021-06-12 PROCEDURE — 3044F HG A1C LEVEL LT 7.0%: CPT | Performed by: FAMILY MEDICINE

## 2021-06-22 ENCOUNTER — OFFICE VISIT (OUTPATIENT)
Dept: FAMILY MEDICINE CLINIC | Facility: CLINIC | Age: 46
End: 2021-06-22
Payer: COMMERCIAL

## 2021-06-22 VITALS
HEIGHT: 71 IN | SYSTOLIC BLOOD PRESSURE: 108 MMHG | BODY MASS INDEX: 29.12 KG/M2 | DIASTOLIC BLOOD PRESSURE: 68 MMHG | WEIGHT: 208 LBS | TEMPERATURE: 96.7 F

## 2021-06-22 DIAGNOSIS — E78.00 HYPERCHOLESTEROLEMIA: ICD-10-CM

## 2021-06-22 DIAGNOSIS — I10 ESSENTIAL HYPERTENSION: ICD-10-CM

## 2021-06-22 DIAGNOSIS — E11.9 TYPE 2 DIABETES MELLITUS WITHOUT COMPLICATION, WITHOUT LONG-TERM CURRENT USE OF INSULIN (HCC): Primary | ICD-10-CM

## 2021-06-22 PROCEDURE — 99214 OFFICE O/P EST MOD 30 MIN: CPT | Performed by: FAMILY MEDICINE

## 2021-06-22 NOTE — PROGRESS NOTES
Assessment/Plan:  Patient will stop nifedipine due to lightheadedness patient will stay well hydrated  Labs reviewed  A1c is 6 0  Cholesterol controlled  Blood pressure controlled  Patient wishes to continue to lose weight and therefore will decrease metformin to 1 tablet daily  Patient will continue with other medications per routine  Follow-up in 4 months  Patient have labs prior to next visit  Other guidance given at this time  Diagnoses and all orders for this visit:    Type 2 diabetes mellitus without complication, without long-term current use of insulin (HCC)  -     metFORMIN (GLUCOPHAGE) 500 mg tablet; Take 1 tablet (500 mg total) by mouth daily with breakfast  -     CBC and differential; Future  -     Comprehensive metabolic panel; Future  -     Hemoglobin A1C; Future    Essential hypertension  -     CBC and differential; Future  -     Comprehensive metabolic panel; Future  -     Hemoglobin A1C; Future    Hypercholesterolemia  -     CBC and differential; Future  -     Comprehensive metabolic panel; Future  -     Hemoglobin A1C; Future            Subjective:        Patient ID: Michael Root is a 51 y o  male  Patient is here to follow-up on  Diabetes hypertension hyperlipidemia  Patient feeling well this time  No new headache or blurred vision or chest pain or shortness of breath problems urinating or defecating  Patient is trying to lose weight  Patient does notice some lightheadedness when bending over trying to stand up quickly  Patient is still taking all medications noted on chart  The following portions of the patient's history were reviewed and updated as appropriate: allergies, current medications, past family history, past medical history, past social history, past surgical history and problem list       Review of Systems   Constitutional: Negative  HENT: Negative  Eyes: Negative  Respiratory: Negative  Cardiovascular: Negative      Gastrointestinal: Negative  Endocrine: Negative  Genitourinary: Negative  Musculoskeletal: Negative  Skin: Negative  Allergic/Immunologic: Negative  Neurological: Positive for light-headedness  Hematological: Negative  Psychiatric/Behavioral: Negative  Objective:      BMI Counseling: Body mass index is 29 01 kg/m²  The BMI is above normal  Nutrition recommendations include decreasing portion sizes  Exercise recommendations include moderate physical activity 150 minutes/week  Depression Screening and Follow-up Plan: Clincally patient does not have depression  No treatment is required  /68 (BP Location: Right arm, Patient Position: Sitting, Cuff Size: Adult)   Temp (!) 96 7 °F (35 9 °C) (Tympanic)   Ht 5' 11" (1 803 m)   Wt 94 3 kg (208 lb)   BMI 29 01 kg/m²          Physical Exam  Vitals and nursing note reviewed  Constitutional:       General: He is not in acute distress  Appearance: Normal appearance  He is not ill-appearing, toxic-appearing or diaphoretic  HENT:      Head: Normocephalic and atraumatic  Right Ear: Tympanic membrane, ear canal and external ear normal  There is no impacted cerumen  Left Ear: Tympanic membrane, ear canal and external ear normal  There is no impacted cerumen  Nose: Nose normal  No congestion or rhinorrhea  Mouth/Throat:      Mouth: Mucous membranes are moist       Pharynx: No oropharyngeal exudate or posterior oropharyngeal erythema  Eyes:      General: No scleral icterus  Right eye: No discharge  Left eye: No discharge  Extraocular Movements: Extraocular movements intact  Conjunctiva/sclera: Conjunctivae normal       Pupils: Pupils are equal, round, and reactive to light  Neck:      Vascular: No carotid bruit  Cardiovascular:      Rate and Rhythm: Normal rate and regular rhythm  Pulses: Normal pulses  Heart sounds: Normal heart sounds  No murmur heard  No friction rub   No gallop  Pulmonary:      Effort: Pulmonary effort is normal  No respiratory distress  Breath sounds: Normal breath sounds  No stridor  No wheezing, rhonchi or rales  Chest:      Chest wall: No tenderness  Abdominal:      General: Abdomen is flat  Bowel sounds are normal  There is no distension  Palpations: Abdomen is soft  Tenderness: There is no abdominal tenderness  There is no guarding or rebound  Musculoskeletal:         General: No swelling, tenderness, deformity or signs of injury  Normal range of motion  Cervical back: Normal range of motion and neck supple  No rigidity  No muscular tenderness  Right lower leg: No edema  Left lower leg: No edema  Lymphadenopathy:      Cervical: No cervical adenopathy  Skin:     General: Skin is warm and dry  Capillary Refill: Capillary refill takes less than 2 seconds  Coloration: Skin is not jaundiced  Findings: No bruising, erythema, lesion or rash  Neurological:      Mental Status: He is alert and oriented to person, place, and time  Mental status is at baseline  Cranial Nerves: No cranial nerve deficit  Sensory: No sensory deficit  Motor: No weakness  Coordination: Coordination normal       Gait: Gait normal    Psychiatric:         Mood and Affect: Mood normal          Behavior: Behavior normal          Thought Content:  Thought content normal          Judgment: Judgment normal

## 2021-07-08 ENCOUNTER — OFFICE VISIT (OUTPATIENT)
Dept: FAMILY MEDICINE CLINIC | Facility: CLINIC | Age: 46
End: 2021-07-08
Payer: COMMERCIAL

## 2021-07-08 VITALS
SYSTOLIC BLOOD PRESSURE: 110 MMHG | BODY MASS INDEX: 28.19 KG/M2 | WEIGHT: 201.4 LBS | HEIGHT: 71 IN | DIASTOLIC BLOOD PRESSURE: 66 MMHG | TEMPERATURE: 95.3 F

## 2021-07-08 DIAGNOSIS — S39.012A LUMBAR STRAIN, INITIAL ENCOUNTER: Primary | ICD-10-CM

## 2021-07-08 PROCEDURE — 99213 OFFICE O/P EST LOW 20 MIN: CPT | Performed by: FAMILY MEDICINE

## 2021-07-08 PROCEDURE — 3078F DIAST BP <80 MM HG: CPT | Performed by: FAMILY MEDICINE

## 2021-07-08 PROCEDURE — 3074F SYST BP LT 130 MM HG: CPT | Performed by: FAMILY MEDICINE

## 2021-07-08 PROCEDURE — 1036F TOBACCO NON-USER: CPT | Performed by: FAMILY MEDICINE

## 2021-07-08 PROCEDURE — 3008F BODY MASS INDEX DOCD: CPT | Performed by: FAMILY MEDICINE

## 2021-07-08 NOTE — PROGRESS NOTES
Assessment/Plan: patient will use diclofenac twice daily with food as directed  Patient will use heat as well as stretching exercises  To consider further treatment if desired in the future  Patient will follow up as needed the/per routine       Diagnoses and all orders for this visit:    Lumbar strain, initial encounter            Subjective:        Patient ID: Genevieve Macias is a 51 y o  male  Patient is here with right lumbar pain and right hip status post lifting  Patient is using stretching exercises  Patient does notice tightness  No radicular symptoms  No problems with urination or defecation  Patient is cleaning out house  The following portions of the patient's history were reviewed and updated as appropriate: allergies, current medications, past family history, past medical history, past social history, past surgical history and problem list       Review of Systems   Constitutional: Negative  HENT: Negative  Eyes: Negative  Respiratory: Negative  Cardiovascular: Negative  Gastrointestinal: Negative  Endocrine: Negative  Genitourinary: Negative  Musculoskeletal: Positive for back pain  Skin: Negative  Allergic/Immunologic: Negative  Neurological: Negative  Hematological: Negative  Psychiatric/Behavioral: Negative  Objective:      BMI Counseling: Body mass index is 28 09 kg/m²  The BMI is above normal  Nutrition recommendations include decreasing portion sizes  Exercise recommendations include moderate physical activity 150 minutes/week  /66 (BP Location: Right arm, Patient Position: Sitting, Cuff Size: Standard)   Temp (!) 95 3 °F (35 2 °C) (Tympanic)   Ht 5' 11" (1 803 m)   Wt 91 4 kg (201 lb 6 4 oz)   BMI 28 09 kg/m²          Physical Exam  Vitals and nursing note reviewed  Constitutional:       General: He is not in acute distress  Appearance: Normal appearance   He is not ill-appearing, toxic-appearing or diaphoretic  HENT:      Head: Normocephalic and atraumatic  Right Ear: Tympanic membrane, ear canal and external ear normal  There is no impacted cerumen  Left Ear: Tympanic membrane, ear canal and external ear normal  There is no impacted cerumen  Nose: Nose normal  No congestion or rhinorrhea  Mouth/Throat:      Mouth: Mucous membranes are moist       Pharynx: No oropharyngeal exudate or posterior oropharyngeal erythema  Eyes:      General: No scleral icterus  Right eye: No discharge  Left eye: No discharge  Extraocular Movements: Extraocular movements intact  Conjunctiva/sclera: Conjunctivae normal       Pupils: Pupils are equal, round, and reactive to light  Neck:      Vascular: No carotid bruit  Cardiovascular:      Rate and Rhythm: Normal rate and regular rhythm  Pulses: Normal pulses  Heart sounds: Normal heart sounds  No murmur heard  No friction rub  No gallop  Pulmonary:      Effort: Pulmonary effort is normal  No respiratory distress  Breath sounds: Normal breath sounds  No stridor  No wheezing, rhonchi or rales  Chest:      Chest wall: No tenderness  Musculoskeletal:         General: Tenderness present  No swelling, deformity or signs of injury  Cervical back: Normal range of motion and neck supple  No rigidity  No muscular tenderness  Right lower leg: No edema  Left lower leg: No edema  Comments: Paravertebral muscle spasm as well as pain with palpation in the right thoracolumbar region  Negative straight leg raise  Lymphadenopathy:      Cervical: No cervical adenopathy  Skin:     General: Skin is warm and dry  Capillary Refill: Capillary refill takes less than 2 seconds  Coloration: Skin is not jaundiced  Findings: No bruising, erythema, lesion or rash  Neurological:      General: No focal deficit present  Mental Status: He is alert and oriented to person, place, and time  Cranial Nerves: No cranial nerve deficit  Sensory: No sensory deficit  Motor: No weakness  Coordination: Coordination normal       Gait: Gait normal    Psychiatric:         Mood and Affect: Mood normal          Behavior: Behavior normal          Thought Content:  Thought content normal          Judgment: Judgment normal

## 2021-08-18 ENCOUNTER — OFFICE VISIT (OUTPATIENT)
Dept: FAMILY MEDICINE CLINIC | Facility: CLINIC | Age: 46
End: 2021-08-18
Payer: COMMERCIAL

## 2021-08-18 VITALS
SYSTOLIC BLOOD PRESSURE: 108 MMHG | BODY MASS INDEX: 28.06 KG/M2 | HEIGHT: 71 IN | WEIGHT: 200.4 LBS | DIASTOLIC BLOOD PRESSURE: 70 MMHG

## 2021-08-18 DIAGNOSIS — S39.012A LUMBAR STRAIN, INITIAL ENCOUNTER: Primary | ICD-10-CM

## 2021-08-18 DIAGNOSIS — E78.00 HYPERCHOLESTEROLEMIA: ICD-10-CM

## 2021-08-18 PROCEDURE — 99213 OFFICE O/P EST LOW 20 MIN: CPT | Performed by: FAMILY MEDICINE

## 2021-08-18 RX ORDER — METHYLPREDNISOLONE ACETATE 80 MG/ML
80 INJECTION, SUSPENSION INTRA-ARTICULAR; INTRALESIONAL; INTRAMUSCULAR; SOFT TISSUE ONCE
Status: CANCELLED | OUTPATIENT
Start: 2021-08-18 | End: 2021-08-18

## 2021-08-18 RX ORDER — ROSUVASTATIN CALCIUM 5 MG/1
5 TABLET, COATED ORAL DAILY
Qty: 90 TABLET | Refills: 1 | Status: SHIPPED | OUTPATIENT
Start: 2021-08-18 | End: 2021-12-09

## 2021-08-18 RX ORDER — MELOXICAM 15 MG/1
15 TABLET ORAL DAILY
Qty: 30 TABLET | Refills: 0 | Status: SHIPPED | OUTPATIENT
Start: 2021-08-18 | End: 2021-08-30

## 2021-08-18 NOTE — PROGRESS NOTES
Assessment/Plan:  Patient start meloxicam daily as directed  Patient will use ice  Patient will have Depo-Medrol 80 given at this time  Diagnoses and all orders for this visit:    Lumbar strain, initial encounter  -     meloxicam (MOBIC) 15 mg tablet; Take 1 tablet (15 mg total) by mouth daily    Hypercholesterolemia  -     rosuvastatin (CRESTOR) 5 mg tablet; Take 1 tablet (5 mg total) by mouth daily            Subjective:        Patient ID: Gumaro He is a 51 y o  male  Patient is here with right-sided lumbar region over the past 2 months  No radicular signs  No problems urinating or defecating  No fevers or chills  Patient using Motrin  The following portions of the patient's history were reviewed and updated as appropriate: allergies, current medications, past family history, past medical history, past social history, past surgical history and problem list       Review of Systems   Constitutional: Negative  HENT: Negative  Eyes: Negative  Respiratory: Negative  Cardiovascular: Negative  Gastrointestinal: Negative  Endocrine: Negative  Genitourinary: Negative  Musculoskeletal: Positive for back pain  Skin: Negative  Allergic/Immunologic: Negative  Neurological: Negative  Hematological: Negative  Psychiatric/Behavioral: Negative  Objective:               /70 (BP Location: Right arm, Patient Position: Sitting, Cuff Size: Standard)   Ht 5' 11" (1 803 m)   Wt 90 9 kg (200 lb 6 4 oz)   BMI 27 95 kg/m²          Physical Exam  Constitutional:       Appearance: Normal appearance  HENT:      Head: Normocephalic and atraumatic  Right Ear: External ear normal       Left Ear: External ear normal    Musculoskeletal:         General: Tenderness present  Comments: Lumbar pain right lumbar region negative straight leg raise  Neurological:      General: No focal deficit present  Mental Status: He is alert     Psychiatric: Mood and Affect: Mood normal          Behavior: Behavior normal          Thought Content:  Thought content normal

## 2021-08-30 ENCOUNTER — OFFICE VISIT (OUTPATIENT)
Dept: FAMILY MEDICINE CLINIC | Facility: CLINIC | Age: 46
End: 2021-08-30
Payer: COMMERCIAL

## 2021-08-30 VITALS
HEIGHT: 71 IN | SYSTOLIC BLOOD PRESSURE: 120 MMHG | DIASTOLIC BLOOD PRESSURE: 80 MMHG | BODY MASS INDEX: 27.58 KG/M2 | WEIGHT: 197 LBS | TEMPERATURE: 98.6 F

## 2021-08-30 DIAGNOSIS — K59.01 SLOW TRANSIT CONSTIPATION: Primary | ICD-10-CM

## 2021-08-30 DIAGNOSIS — L30.9 DERMATITIS: ICD-10-CM

## 2021-08-30 PROCEDURE — 99213 OFFICE O/P EST LOW 20 MIN: CPT | Performed by: FAMILY MEDICINE

## 2021-08-30 PROCEDURE — 1036F TOBACCO NON-USER: CPT | Performed by: FAMILY MEDICINE

## 2021-08-30 PROCEDURE — 3074F SYST BP LT 130 MM HG: CPT | Performed by: FAMILY MEDICINE

## 2021-08-30 PROCEDURE — 3008F BODY MASS INDEX DOCD: CPT | Performed by: FAMILY MEDICINE

## 2021-08-30 PROCEDURE — 3079F DIAST BP 80-89 MM HG: CPT | Performed by: FAMILY MEDICINE

## 2021-08-30 RX ORDER — BETAMETHASONE DIPROPIONATE 0.5 MG/G
CREAM TOPICAL 2 TIMES DAILY
Qty: 30 G | Refills: 0 | Status: SHIPPED | OUTPATIENT
Start: 2021-08-30 | End: 2021-12-16

## 2021-08-30 RX ORDER — AMOXICILLIN 250 MG
2 CAPSULE ORAL DAILY
Qty: 30 TABLET | Refills: 0 | Status: SHIPPED | OUTPATIENT
Start: 2021-08-30 | End: 2022-05-10

## 2021-08-30 NOTE — PROGRESS NOTES
Assessment/Plan: patient will stay well hydrated for constipation and will continue to increase fiber  Diagnoses and all orders for this visit:    Slow transit constipation  -     senna-docusate sodium (SENOKOT S) 8 6-50 mg per tablet; Take 2 tablets by mouth daily    Dermatitis  -     betamethasone, augmented, (DIPROLENE-AF) 0 05 % cream; Apply topically 2 (two) times a day            Subjective:        Patient ID: Emerson Meza is a 51 y o  male  Patient is here with dull ache in the lower abdomen bilaterally beginning yesterday  Patient did over  The Saturday  Patient with some constipation  The patient did use a fiber  Patient with constipation over the past 10 days  Patient also with small red areas on legs anteriorly  Patient does notice some pruritus  Patient is using moisturizing lotion  The following portions of the patient's history were reviewed and updated as appropriate: allergies, current medications, past family history, past medical history, past social history, past surgical history and problem list       Review of Systems   Constitutional: Negative  HENT: Negative  Eyes: Negative  Respiratory: Negative  Cardiovascular: Negative  Gastrointestinal: Positive for abdominal pain and constipation  Endocrine: Negative  Genitourinary: Negative  Musculoskeletal: Negative  Skin: Negative  Allergic/Immunologic: Negative  Neurological: Negative  Hematological: Negative  Psychiatric/Behavioral: Negative  Objective:        Depression Screening and Follow-up Plan: Clincally patient does not have depression  No treatment is required  /80 (BP Location: Right arm, Patient Position: Sitting, Cuff Size: Standard)   Temp 98 6 °F (37 °C) (Tympanic)   Ht 5' 11" (1 803 m)   Wt 89 4 kg (197 lb)   BMI 27 48 kg/m²          Physical Exam  Vitals and nursing note reviewed     Constitutional:       General: He is not in acute distress  Appearance: Normal appearance  He is well-developed  He is not ill-appearing, toxic-appearing or diaphoretic  HENT:      Head: Normocephalic and atraumatic  Eyes:      General: No scleral icterus  Right eye: No discharge  Left eye: No discharge  Extraocular Movements: Extraocular movements intact  Conjunctiva/sclera: Conjunctivae normal       Pupils: Pupils are equal, round, and reactive to light  Neck:      Vascular: No carotid bruit  Cardiovascular:      Rate and Rhythm: Normal rate and regular rhythm  Pulses: Normal pulses  Heart sounds: Normal heart sounds  No murmur heard  No friction rub  No gallop  Pulmonary:      Effort: Pulmonary effort is normal  No respiratory distress  Breath sounds: Normal breath sounds  No stridor  No wheezing, rhonchi or rales  Chest:      Chest wall: No tenderness  Abdominal:      General: Abdomen is flat  Bowel sounds are normal  There is no distension  Palpations: Abdomen is soft  Tenderness: There is no abdominal tenderness  There is no guarding or rebound  Musculoskeletal:         General: No swelling, tenderness, deformity or signs of injury  Normal range of motion  Cervical back: Normal range of motion and neck supple  No rigidity  No muscular tenderness  Right lower leg: No edema  Left lower leg: No edema  Lymphadenopathy:      Cervical: No cervical adenopathy  Skin:     General: Skin is warm and dry  Capillary Refill: Capillary refill takes less than 2 seconds  Coloration: Skin is not jaundiced  Findings: Rash present  No bruising, erythema or lesion  Comments: Discrete nightly red the lesions  Lower legs anteriorly   Neurological:      Mental Status: He is alert and oriented to person, place, and time  Mental status is at baseline  Cranial Nerves: No cranial nerve deficit  Sensory: No sensory deficit  Motor: No weakness        Coordination: Coordination normal       Gait: Gait normal    Psychiatric:         Mood and Affect: Mood normal          Behavior: Behavior normal          Thought Content:  Thought content normal          Judgment: Judgment normal

## 2021-09-02 DIAGNOSIS — E11.9 TYPE 2 DIABETES MELLITUS WITHOUT COMPLICATION, WITHOUT LONG-TERM CURRENT USE OF INSULIN (HCC): ICD-10-CM

## 2021-09-28 ENCOUNTER — OFFICE VISIT (OUTPATIENT)
Dept: FAMILY MEDICINE CLINIC | Facility: CLINIC | Age: 46
End: 2021-09-28
Payer: COMMERCIAL

## 2021-09-28 VITALS
SYSTOLIC BLOOD PRESSURE: 110 MMHG | BODY MASS INDEX: 27.41 KG/M2 | WEIGHT: 195.8 LBS | RESPIRATION RATE: 16 BRPM | OXYGEN SATURATION: 98 % | HEART RATE: 80 BPM | DIASTOLIC BLOOD PRESSURE: 72 MMHG | TEMPERATURE: 96.8 F | HEIGHT: 71 IN

## 2021-09-28 DIAGNOSIS — E78.00 HYPERCHOLESTEROLEMIA: ICD-10-CM

## 2021-09-28 DIAGNOSIS — I10 ESSENTIAL HYPERTENSION: ICD-10-CM

## 2021-09-28 DIAGNOSIS — F52.21 ERECTILE DYSFUNCTION OF NON-ORGANIC ORIGIN: ICD-10-CM

## 2021-09-28 DIAGNOSIS — E11.9 TYPE 2 DIABETES MELLITUS WITHOUT COMPLICATION, WITHOUT LONG-TERM CURRENT USE OF INSULIN (HCC): Primary | ICD-10-CM

## 2021-09-28 PROCEDURE — 3078F DIAST BP <80 MM HG: CPT | Performed by: FAMILY MEDICINE

## 2021-09-28 PROCEDURE — 3008F BODY MASS INDEX DOCD: CPT | Performed by: FAMILY MEDICINE

## 2021-09-28 PROCEDURE — 3074F SYST BP LT 130 MM HG: CPT | Performed by: FAMILY MEDICINE

## 2021-09-28 PROCEDURE — 99214 OFFICE O/P EST MOD 30 MIN: CPT | Performed by: FAMILY MEDICINE

## 2021-09-28 PROCEDURE — 1036F TOBACCO NON-USER: CPT | Performed by: FAMILY MEDICINE

## 2021-09-28 RX ORDER — VARDENAFIL 11.85 MG/1
1 TABLET ORAL DAILY PRN
Qty: 10 TABLET | Refills: 5 | Status: SHIPPED | OUTPATIENT
Start: 2021-09-28 | End: 2022-02-02 | Stop reason: SDUPTHER

## 2021-09-28 NOTE — PROGRESS NOTES
Assessment/Plan:  Patient will continue with current treatment plan  Patient diabetes control with A1c of 5 4  the hypertension hyperlipidemia  Refills given this time  Patient have laboratory studies prior to next visit  Labs reviewed at this time  Patient follow-up 3 months or as needed  Diagnoses and all orders for this visit:    Type 2 diabetes mellitus without complication, without long-term current use of insulin (HCC)  -     CBC and differential; Future  -     Comprehensive metabolic panel; Future  -     Hemoglobin A1C; Future  -     Lipid panel; Future  -     TSH, 3rd generation with Free T4 reflex; Future  -     Microalbumin / creatinine urine ratio    Essential hypertension  -     CBC and differential; Future  -     Comprehensive metabolic panel; Future  -     Hemoglobin A1C; Future  -     Lipid panel; Future  -     TSH, 3rd generation with Free T4 reflex; Future  -     Microalbumin / creatinine urine ratio    Hypercholesterolemia  -     CBC and differential; Future  -     Comprehensive metabolic panel; Future  -     Hemoglobin A1C; Future  -     Lipid panel; Future  -     TSH, 3rd generation with Free T4 reflex; Future  -     Microalbumin / creatinine urine ratio    Erectile dysfunction of non-organic origin  -     Vardenafil HCl 10 MG TBDP; Take 1 tablet (10 mg total) by mouth daily as needed (erectile dysfunction)  -     CBC and differential; Future  -     Comprehensive metabolic panel; Future  -     Hemoglobin A1C; Future  -     Lipid panel; Future  -     TSH, 3rd generation with Free T4 reflex; Future  -     Microalbumin / creatinine urine ratio            Subjective:        Patient ID: John Gautam is a 51 y o  male  Patient follow-up on diabetes hypertension hyperlipidemia  Labs reviewed with patient  Patient has been trying to lose weight exercise and diet appropriately  Patient feeling well overall          The following portions of the patient's history were reviewed and updated as appropriate: allergies, current medications, past family history, past medical history, past social history, past surgical history and problem list       Review of Systems   Constitutional: Negative  HENT: Negative  Eyes: Negative  Respiratory: Negative  Cardiovascular: Negative  Gastrointestinal: Negative  Endocrine: Negative  Genitourinary: Negative  Musculoskeletal: Negative  Skin: Negative  Allergic/Immunologic: Negative  Neurological: Negative  Hematological: Negative  Psychiatric/Behavioral: Negative  Objective:      BMI Counseling: Body mass index is 27 31 kg/m²  The BMI is above normal  Nutrition recommendations include decreasing portion sizes  Exercise recommendations include moderate physical activity 150 minutes/week  Rationale for BMI follow-up plan is due to patient being overweight or obese  /72 (BP Location: Right arm, Patient Position: Sitting, Cuff Size: Standard)   Pulse 80   Temp (!) 96 8 °F (36 °C) (Tympanic)   Resp 16   Ht 5' 11" (1 803 m)   Wt 88 8 kg (195 lb 12 8 oz)   SpO2 98%   BMI 27 31 kg/m²          Physical Exam  Vitals and nursing note reviewed  Constitutional:       General: He is not in acute distress  Appearance: Normal appearance  He is not ill-appearing, toxic-appearing or diaphoretic  HENT:      Head: Normocephalic and atraumatic  Right Ear: Tympanic membrane, ear canal and external ear normal  There is no impacted cerumen  Left Ear: Tympanic membrane, ear canal and external ear normal  There is no impacted cerumen  Nose: Nose normal  No congestion or rhinorrhea  Mouth/Throat:      Mouth: Mucous membranes are moist       Pharynx: No oropharyngeal exudate or posterior oropharyngeal erythema  Eyes:      General: No scleral icterus  Right eye: No discharge  Left eye: No discharge  Extraocular Movements: Extraocular movements intact        Conjunctiva/sclera: Conjunctivae normal       Pupils: Pupils are equal, round, and reactive to light  Neck:      Vascular: No carotid bruit  Cardiovascular:      Rate and Rhythm: Normal rate and regular rhythm  Pulses: Normal pulses  Heart sounds: Normal heart sounds  No murmur heard  No friction rub  No gallop  Pulmonary:      Effort: Pulmonary effort is normal  No respiratory distress  Breath sounds: Normal breath sounds  No stridor  No wheezing, rhonchi or rales  Chest:      Chest wall: No tenderness  Abdominal:      General: Abdomen is flat  Bowel sounds are normal  There is no distension  Palpations: Abdomen is soft  Tenderness: There is no abdominal tenderness  There is no guarding or rebound  Musculoskeletal:         General: No swelling, tenderness, deformity or signs of injury  Normal range of motion  Cervical back: Normal range of motion and neck supple  No rigidity  No muscular tenderness  Right lower leg: No edema  Left lower leg: No edema  Lymphadenopathy:      Cervical: No cervical adenopathy  Skin:     General: Skin is warm and dry  Capillary Refill: Capillary refill takes less than 2 seconds  Coloration: Skin is not jaundiced  Findings: No bruising, erythema, lesion or rash  Neurological:      Mental Status: He is alert and oriented to person, place, and time  Mental status is at baseline  Cranial Nerves: No cranial nerve deficit  Sensory: No sensory deficit  Motor: No weakness  Coordination: Coordination normal       Gait: Gait normal    Psychiatric:         Mood and Affect: Mood normal          Behavior: Behavior normal          Thought Content:  Thought content normal          Judgment: Judgment normal

## 2021-10-09 DIAGNOSIS — F41.9 ANXIETY: ICD-10-CM

## 2021-10-11 RX ORDER — CLONAZEPAM 2 MG/1
TABLET ORAL
Qty: 90 TABLET | Refills: 0 | Status: SHIPPED | OUTPATIENT
Start: 2021-10-11 | End: 2021-12-09

## 2021-12-17 DIAGNOSIS — F41.9 ANXIETY: ICD-10-CM

## 2021-12-17 RX ORDER — CLONAZEPAM 2 MG/1
2 TABLET ORAL 3 TIMES DAILY
Qty: 90 TABLET | Refills: 0 | Status: SHIPPED | OUTPATIENT
Start: 2021-12-17 | End: 2022-02-02

## 2022-01-12 ENCOUNTER — VBI (OUTPATIENT)
Dept: ADMINISTRATIVE | Facility: OTHER | Age: 47
End: 2022-01-12

## 2022-02-02 DIAGNOSIS — F52.21 ERECTILE DYSFUNCTION OF NON-ORGANIC ORIGIN: ICD-10-CM

## 2022-02-02 RX ORDER — VARDENAFIL 11.85 MG/1
1 TABLET ORAL DAILY PRN
Qty: 10 TABLET | Refills: 0 | Status: SHIPPED | OUTPATIENT
Start: 2022-02-02 | End: 2022-02-07

## 2022-02-15 DIAGNOSIS — F52.21 ERECTILE DYSFUNCTION OF NON-ORGANIC ORIGIN: ICD-10-CM

## 2022-02-15 DIAGNOSIS — E78.00 HYPERCHOLESTEROLEMIA: ICD-10-CM

## 2022-02-15 RX ORDER — ROSUVASTATIN CALCIUM 5 MG/1
5 TABLET, COATED ORAL DAILY
Qty: 90 TABLET | Refills: 0 | Status: SHIPPED | OUTPATIENT
Start: 2022-02-15 | End: 2022-05-10 | Stop reason: SDUPTHER

## 2022-02-15 RX ORDER — VARDENAFIL 11.85 MG/1
TABLET ORAL
Qty: 6 TABLET | Refills: 1 | Status: SHIPPED | OUTPATIENT
Start: 2022-02-15 | End: 2022-05-10 | Stop reason: SDUPTHER

## 2022-05-10 ENCOUNTER — OFFICE VISIT (OUTPATIENT)
Dept: FAMILY MEDICINE CLINIC | Facility: CLINIC | Age: 47
End: 2022-05-10
Payer: COMMERCIAL

## 2022-05-10 VITALS
OXYGEN SATURATION: 99 % | HEART RATE: 81 BPM | RESPIRATION RATE: 20 BRPM | TEMPERATURE: 97.7 F | HEIGHT: 71 IN | DIASTOLIC BLOOD PRESSURE: 80 MMHG | WEIGHT: 198.2 LBS | SYSTOLIC BLOOD PRESSURE: 120 MMHG | BODY MASS INDEX: 27.75 KG/M2

## 2022-05-10 DIAGNOSIS — F52.21 ERECTILE DYSFUNCTION OF NON-ORGANIC ORIGIN: ICD-10-CM

## 2022-05-10 DIAGNOSIS — E78.00 HYPERCHOLESTEROLEMIA: ICD-10-CM

## 2022-05-10 DIAGNOSIS — E11.9 TYPE 2 DIABETES MELLITUS WITHOUT COMPLICATION, WITHOUT LONG-TERM CURRENT USE OF INSULIN (HCC): ICD-10-CM

## 2022-05-10 DIAGNOSIS — Z12.11 SCREENING FOR COLON CANCER: Primary | ICD-10-CM

## 2022-05-10 DIAGNOSIS — F41.9 ANXIETY: ICD-10-CM

## 2022-05-10 DIAGNOSIS — I10 PRIMARY HYPERTENSION: ICD-10-CM

## 2022-05-10 LAB — SL AMB POCT HEMOGLOBIN AIC: 5.2 (ref ?–6.5)

## 2022-05-10 PROCEDURE — 83036 HEMOGLOBIN GLYCOSYLATED A1C: CPT | Performed by: FAMILY MEDICINE

## 2022-05-10 PROCEDURE — 3079F DIAST BP 80-89 MM HG: CPT | Performed by: FAMILY MEDICINE

## 2022-05-10 PROCEDURE — 3044F HG A1C LEVEL LT 7.0%: CPT | Performed by: FAMILY MEDICINE

## 2022-05-10 PROCEDURE — 99214 OFFICE O/P EST MOD 30 MIN: CPT | Performed by: FAMILY MEDICINE

## 2022-05-10 PROCEDURE — 3725F SCREEN DEPRESSION PERFORMED: CPT | Performed by: FAMILY MEDICINE

## 2022-05-10 PROCEDURE — 3074F SYST BP LT 130 MM HG: CPT | Performed by: FAMILY MEDICINE

## 2022-05-10 PROCEDURE — 3008F BODY MASS INDEX DOCD: CPT | Performed by: FAMILY MEDICINE

## 2022-05-10 RX ORDER — ROSUVASTATIN CALCIUM 5 MG/1
5 TABLET, COATED ORAL DAILY
Qty: 90 TABLET | Refills: 1 | Status: SHIPPED | OUTPATIENT
Start: 2022-05-10

## 2022-05-10 RX ORDER — CLONAZEPAM 2 MG/1
2 TABLET ORAL 3 TIMES DAILY
Qty: 90 TABLET | Refills: 2 | Status: SHIPPED | OUTPATIENT
Start: 2022-05-10

## 2022-05-10 RX ORDER — VARDENAFIL 11.85 MG/1
TABLET ORAL
Qty: 6 TABLET | Refills: 1 | Status: SHIPPED | OUTPATIENT
Start: 2022-05-10

## 2022-05-10 NOTE — PROGRESS NOTES
Assessment/Plan: A1c 5 2   CMP CBC reviewed as well as TSH and lipid profile from September and June of last year  LDL 58  Patient doing very well this time  Diabetes hypertension hyperlipidemia anxiety as well as erectile dysfunctional stable present time  Continue with current regimen other than decreasing metformin to 1 tablet daily  Patient did have monofilament test done which was normal at this time  Patient recently saw ophthalmologist   Juan Gonzales with daily foot care  Patient will follow-up 1 months or as needed  Will order blood work at follow-up visit  Refills given at this time  Diagnoses and all orders for this visit:    Screening for colon cancer  -     Cologuard    Type 2 diabetes mellitus without complication, without long-term current use of insulin (HCC)  -     POCT hemoglobin A1c    Anxiety  -     clonazePAM (KlonoPIN) 2 mg tablet; Take 1 tablet (2 mg total) by mouth 3 (three) times a day    Hypercholesterolemia  -     rosuvastatin (CRESTOR) 5 mg tablet; Take 1 tablet (5 mg total) by mouth daily    Erectile dysfunction of non-organic origin  -     Vardenafil HCl 10 MG TBDP; 1 tablet daily as needed  Primary hypertension            Subjective:        Patient ID: Andre Clark is a 49 y o  male  Patient is here to follow-up on diabetes hypertension hyperlipidemia with history of erectile dysfunction as well as anxiety  Patient did do job but has job at this time with insurance  Patient is here for modest in February  Patient with new glasses  Patient did increase metformin to twice daily around Antoni time  Patient has notice some foot related issues with this as well as some joint aches  A1c today is 5 2  Diabetes stable overall  Anxiety stable  No chest pain or shortness of breath or difficulty with urination or defecation          The following portions of the patient's history were reviewed and updated as appropriate: allergies, current medications, past family history, past medical history, past social history, past surgical history and problem list       Review of Systems   Constitutional: Negative  HENT: Negative  Eyes: Negative  Respiratory: Negative  Cardiovascular: Negative  Gastrointestinal: Negative  Endocrine: Negative  Genitourinary: Negative  Musculoskeletal: Positive for arthralgias  Skin: Negative  Allergic/Immunologic: Negative  Neurological: Negative  Hematological: Negative  Psychiatric/Behavioral: Negative  Objective:      BMI Counseling: Body mass index is 27 64 kg/m²  The BMI is above normal  Nutrition recommendations include consuming healthier snacks  Exercise recommendations include moderate physical activity 150 minutes/week  Rationale for BMI follow-up plan is due to patient being overweight or obese  Depression Screening and Follow-up Plan: Patient was screened for depression during today's encounter  They screened negative with a PHQ-2 score of 0             /80 (BP Location: Right arm, Patient Position: Sitting, Cuff Size: Standard)   Pulse 81   Temp 97 7 °F (36 5 °C) (Temporal)   Resp 20   Ht 5' 11" (1 803 m)   Wt 89 9 kg (198 lb 3 2 oz)   SpO2 99%   BMI 27 64 kg/m²          Physical Exam  Vitals and nursing note reviewed  Constitutional:       General: He is not in acute distress  Appearance: Normal appearance  He is not ill-appearing, toxic-appearing or diaphoretic  HENT:      Head: Normocephalic and atraumatic  Right Ear: Tympanic membrane, ear canal and external ear normal  There is no impacted cerumen  Left Ear: Tympanic membrane, ear canal and external ear normal  There is no impacted cerumen  Nose: Nose normal  No congestion or rhinorrhea  Mouth/Throat:      Mouth: Mucous membranes are moist       Pharynx: No oropharyngeal exudate or posterior oropharyngeal erythema  Eyes:      General: No scleral icterus  Right eye: No discharge  Left eye: No discharge  Extraocular Movements: Extraocular movements intact  Conjunctiva/sclera: Conjunctivae normal       Pupils: Pupils are equal, round, and reactive to light  Neck:      Vascular: No carotid bruit  Cardiovascular:      Rate and Rhythm: Normal rate and regular rhythm  Pulses: Normal pulses  Heart sounds: Normal heart sounds  No murmur heard  No friction rub  No gallop  Pulmonary:      Effort: Pulmonary effort is normal  No respiratory distress  Breath sounds: Normal breath sounds  No stridor  No wheezing, rhonchi or rales  Chest:      Chest wall: No tenderness  Musculoskeletal:         General: No swelling, tenderness, deformity or signs of injury  Normal range of motion  Cervical back: Normal range of motion and neck supple  No rigidity  No muscular tenderness  Right lower leg: No edema  Left lower leg: No edema  Lymphadenopathy:      Cervical: No cervical adenopathy  Skin:     General: Skin is warm and dry  Capillary Refill: Capillary refill takes less than 2 seconds  Coloration: Skin is not jaundiced  Findings: No bruising, erythema, lesion or rash  Neurological:      Mental Status: He is alert and oriented to person, place, and time  Mental status is at baseline  Cranial Nerves: No cranial nerve deficit  Sensory: No sensory deficit  Motor: No weakness  Coordination: Coordination normal       Gait: Gait normal    Psychiatric:         Mood and Affect: Mood normal          Behavior: Behavior normal          Thought Content:  Thought content normal          Judgment: Judgment normal

## 2022-05-10 NOTE — PATIENT INSTRUCTIONS
10% - bad control"> 10% - bad control,Hemoglobin A1c (HbA1c) greater than 10% indicating poor diabetic control,Haemoglobin A1c greater than 10% indicating poor diabetic control">   Diabetes Mellitus Type 2 in Adults, Ambulatory Care   GENERAL INFORMATION:   Diabetes mellitus type 2  is a disease that affects how your body uses glucose (sugar)  Insulin helps move sugar out of the blood so it can be used for energy  Normally, when the blood sugar level increases, the pancreas makes more insulin  Type 2 diabetes develops because either the body cannot make enough insulin, or it cannot use the insulin correctly  After many years, your pancreas may stop making insulin  Common symptoms include the following:   · More hunger or thirst than usual     · Frequent urination     · Weight loss without trying     · Blurred vision  Seek immediate care for the following symptoms:   · Severe abdominal pain, or pain that spreads to your back  You may also be vomiting  · Trouble staying awake or focusing    · Shaking or sweating    · Blurred or double vision    · Breath has a fruity, sweet smell    · Breathing is deep and labored, or rapid and shallow    · Heartbeat is fast and weak  Treatment for diabetes mellitus type 2  includes keeping your blood sugar at a normal level  You must eat the right foods, and exercise regularly  You may also need medicine if you cannot control your blood sugar level with nutrition and exercise  Manage diabetes mellitus type 2:   · Check your blood sugar level  You will be taught how to check a small drop of blood in a glucose monitor  Ask your healthcare provider when and how often to check during the day  Ask your healthcare provider what your blood sugar levels should be when you check them  · Keep track of carbohydrates (sugar and starchy foods)  Your blood sugar level can get too high if you eat too many carbohydrates   Your dietitian will help you plan meals and snacks that have the right amount of carbohydrates  · Eat low-fat foods  Some examples are skinless chicken and low-fat milk  · Eat less sodium (salt)  Some examples of high-sodium foods to limit are soy sauce, potato chips, and soup  Do not add salt to food you cook  Limit your use of table salt  · Eat high-fiber foods  Foods that are a good source of fiber include vegetables, whole grain bread, and beans  · Limit alcohol  Alcohol affects your blood sugar level and can make it harder to manage your diabetes  Women should limit alcohol to 1 drink a day  Men should limit alcohol to 2 drinks a day  A drink of alcohol is 12 ounces of beer, 5 ounces of wine, or 1½ ounces of liquor  · Get regular exercise  Exercise can help keep your blood sugar level steady, decrease your risk of heart disease, and help you lose weight  Exercise for at least 30 minutes, 5 days a week  Include muscle strengthening activities 2 days each week  Work with your healthcare provider to create an exercise plan  · Check your feet each day  for injuries or open sores  Ask your healthcare provider for activities you can do if you have an open sore  · Quit smoking  If you smoke, it is never too late to quit  Smoking can worsen the problems that may occur with diabetes  Ask your healthcare provider for information about how to stop smoking if you are having trouble quitting  · Ask about your weight:  Ask healthcare providers if you need to lose weight, and how much to lose  Ask them to help you with a weight loss program  Even a 10 to 15 pound weight loss can help you manage your blood sugar level  · Carry medical alert identification  Wear medical alert jewelry or carry a card that says you have diabetes  Ask your healthcare provider where to get these items  · Ask about vaccines  Diabetes puts you at risk of serious illness if you get the flu, pneumonia, or hepatitis   Ask your healthcare provider if you should get a flu, pneumonia, or hepatitis B vaccine, and when to get the vaccine  Follow up with your healthcare provider as directed:  Write down your questions so you remember to ask them during your visits  CARE AGREEMENT:   You have the right to help plan your care  Learn about your health condition and how it may be treated  Discuss treatment options with your caregivers to decide what care you want to receive  You always have the right to refuse treatment  The above information is an  only  It is not intended as medical advice for individual conditions or treatments  Talk to your doctor, nurse or pharmacist before following any medical regimen to see if it is safe and effective for you  © 2014 [address] is for End User's use only and may not be sold, redistributed or otherwise used for commercial purposes  All illustrations and images included in CareNotes® are the copyrighted property of A D A M [employer]  or [employer]

## 2022-07-17 NOTE — LETTER
Diabetic Eye Exam Form    Date Requested: 21  Patient: Sridevi Norton  Patient : 1975   Referring Provider: Gallo Dempsey, DO    Dilated Retinal Exam, Optomap-Iris Exam, or Fundus Photography Done         Yes ([place] one above)         No     Date of Diabetic Eye Exam ______________________________  Left Eye      Exam did show retinopathy    Exam did not show retinopathy         Mild       Moderate       None       Proliferative       Severe     Right Eye     Exam did show retinopathy    Exam did not show retinopathy         Mild       Moderate       None       Proliferative       Severe     Comments __________________________________________________________    Practice Providing Exam ______________________________________________    Exam Performed By (print name) _______________________________________      Provider Signature ___________________________________________________      These reports are needed for  compliance    Please fax this completed form and a copy of the Diabetic Eye Exam report to our office located at [place] [address] as soon as possible to [phone] ray Gonzalez: Phone [phone]    We thank you for your assistance in treating our mutual patient - - -

## 2022-08-23 ENCOUNTER — OFFICE VISIT (OUTPATIENT)
Dept: FAMILY MEDICINE CLINIC | Facility: CLINIC | Age: 47
End: 2022-08-23
Payer: COMMERCIAL

## 2022-08-23 VITALS
HEIGHT: 71 IN | HEART RATE: 78 BPM | SYSTOLIC BLOOD PRESSURE: 128 MMHG | WEIGHT: 194.2 LBS | BODY MASS INDEX: 27.19 KG/M2 | TEMPERATURE: 98.4 F | OXYGEN SATURATION: 98 % | DIASTOLIC BLOOD PRESSURE: 74 MMHG

## 2022-08-23 DIAGNOSIS — M54.50 ACUTE MIDLINE LOW BACK PAIN, UNSPECIFIED WHETHER SCIATICA PRESENT: ICD-10-CM

## 2022-08-23 DIAGNOSIS — Z00.00 WELL ADULT EXAM: Primary | ICD-10-CM

## 2022-08-23 DIAGNOSIS — E78.00 HYPERCHOLESTEROLEMIA: ICD-10-CM

## 2022-08-23 DIAGNOSIS — I10 PRIMARY HYPERTENSION: ICD-10-CM

## 2022-08-23 DIAGNOSIS — E11.9 TYPE 2 DIABETES MELLITUS WITHOUT COMPLICATION, WITHOUT LONG-TERM CURRENT USE OF INSULIN (HCC): ICD-10-CM

## 2022-08-23 DIAGNOSIS — Z12.11 SCREENING FOR COLON CANCER: Primary | ICD-10-CM

## 2022-08-23 LAB — SL AMB POCT HEMOGLOBIN AIC: 5.2 (ref ?–6.5)

## 2022-08-23 PROCEDURE — 99396 PREV VISIT EST AGE 40-64: CPT | Performed by: FAMILY MEDICINE

## 2022-08-23 PROCEDURE — 3044F HG A1C LEVEL LT 7.0%: CPT | Performed by: FAMILY MEDICINE

## 2022-08-23 PROCEDURE — 83036 HEMOGLOBIN GLYCOSYLATED A1C: CPT | Performed by: FAMILY MEDICINE

## 2022-08-23 PROCEDURE — 3078F DIAST BP <80 MM HG: CPT | Performed by: FAMILY MEDICINE

## 2022-08-23 PROCEDURE — 3074F SYST BP LT 130 MM HG: CPT | Performed by: FAMILY MEDICINE

## 2022-08-23 RX ORDER — METHYLPREDNISOLONE ACETATE 40 MG/ML
40 INJECTION, SUSPENSION INTRA-ARTICULAR; INTRALESIONAL; INTRAMUSCULAR; SOFT TISSUE ONCE
Status: SHIPPED | OUTPATIENT
Start: 2022-08-23

## 2022-08-23 NOTE — PATIENT INSTRUCTIONS

## 2022-08-23 NOTE — PROGRESS NOTES
Assessment/Plan:   Patient do Cologuard for colorectal screening  No early family history of prostate cancer  Patient have other laboratory studies done at this time  Vaccines up to date  Depo-Medrol 40 mg IM given at this time for back pain  Diagnoses and all orders for this visit:    Well adult exam    Type 2 diabetes mellitus without complication, without long-term current use of insulin ([facility] [address] )  -     POCT hemoglobin A1c  -     CBC and differential; Future  -     Comprehensive metabolic panel; Future  -     Lipid panel; Future  -     TSH, 3rd generation with Free T4 reflex; Future  -     Hemoglobin A1C; Future  -     Microalbumin / creatinine urine ratio    Hypercholesterolemia  -     CBC and differential; Future  -     Comprehensive metabolic panel; Future  -     Lipid panel; Future  -     TSH, 3rd generation with Free T4 reflex; Future  -     Hemoglobin A1C; Future  -     Microalbumin / creatinine urine ratio    Primary hypertension  -     CBC and differential; Future  -     Comprehensive metabolic panel; Future  -     Lipid panel; Future  -     TSH, 3rd generation with Free T4 reflex; Future  -     Hemoglobin A1C; Future  -     Microalbumin / creatinine urine ratio            Subjective:        Patient ID: Seth Paulino is a 49 y o  male  Patient is here for wellness exam   Labs and vaccines reviewed  No early family history of prostate cancer  No colorectal screening done yet           The following portions of the patient's history were reviewed and updated as appropriate: allergies, current medications, past family history, past medical history, past social history, past surgical history and problem list       Review of Systems   Constitutional: Negative  HENT: Negative  Eyes: Negative  Respiratory: Negative  Cardiovascular: Negative  Gastrointestinal: Negative  Endocrine: Negative  Genitourinary: Negative  Musculoskeletal: Positive for back pain  Skin: Negative  Allergic/Immunologic: Negative  Neurological: Negative  Hematological: Negative  Psychiatric/Behavioral: Negative  Objective:               /74 (BP Location: Right arm, Patient Position: Sitting, Cuff Size: Standard)   Pulse 78   Temp 98 4 °F (36 9 °C) (Temporal)   Ht 5' 11" (1 803 m)   Wt 88 1 kg (194 lb 3 2 oz)   SpO2 98%   BMI 27 09 kg/m²          Physical Exam  Vitals and nursing note reviewed  Constitutional:       General: He is not in acute distress  Appearance: Normal appearance  He is not ill-appearing, toxic-appearing or diaphoretic  HENT:      Head: Normocephalic and atraumatic  Right Ear: Tympanic membrane, ear canal and external ear normal  There is no impacted cerumen  Left Ear: Tympanic membrane, ear canal and external ear normal  There is no impacted cerumen  Nose: Nose normal  No congestion or rhinorrhea  Mouth/Throat:      Mouth: Mucous membranes are moist       Pharynx: No oropharyngeal exudate or posterior oropharyngeal erythema  Eyes:      General: No scleral icterus  Right eye: No discharge  Left eye: No discharge  Extraocular Movements: Extraocular movements intact  Conjunctiva/sclera: Conjunctivae normal       Pupils: Pupils are equal, round, and reactive to light  Neck:      Vascular: No carotid bruit  Cardiovascular:      Rate and Rhythm: Normal rate and regular rhythm  Pulses: Normal pulses  Heart sounds: Normal heart sounds  No murmur heard  No friction rub  No gallop  Pulmonary:      Effort: Pulmonary effort is normal  No respiratory distress  Breath sounds: Normal breath sounds  No stridor  No wheezing, rhonchi or rales  Chest:      Chest wall: No tenderness  Musculoskeletal:         General: No swelling, tenderness, deformity or signs of injury  Normal range of motion  Cervical back: Normal range of motion and neck supple  No rigidity  No muscular tenderness  Right lower leg: No edema  Left lower leg: No edema  Lymphadenopathy:      Cervical: No cervical adenopathy  Skin:     General: Skin is warm and dry  Capillary Refill: Capillary refill takes less than 2 seconds  Coloration: Skin is not jaundiced  Findings: No bruising, erythema, lesion or rash  Neurological:      Mental Status: He is alert and oriented to person, place, and time  Mental status is at baseline  Cranial Nerves: No cranial nerve deficit  Sensory: No sensory deficit  Motor: No weakness  Coordination: Coordination normal       Gait: Gait normal    Psychiatric:         Mood and Affect: Mood normal          Behavior: Behavior normal          Thought Content:  Thought content normal          Judgment: Judgment normal

## 2022-10-22 PROBLEM — Z00.00 WELL ADULT EXAM: Status: RESOLVED | Noted: 2022-08-23 | Resolved: 2022-10-22

## 2022-11-08 LAB — HBA1C MFR BLD HPLC: 5.3 %

## 2022-11-15 ENCOUNTER — OFFICE VISIT (OUTPATIENT)
Dept: FAMILY MEDICINE CLINIC | Facility: CLINIC | Age: 47
End: 2022-11-15

## 2022-11-15 VITALS
HEART RATE: 70 BPM | HEIGHT: 71 IN | WEIGHT: 189.8 LBS | TEMPERATURE: 97.4 F | SYSTOLIC BLOOD PRESSURE: 128 MMHG | OXYGEN SATURATION: 99 % | DIASTOLIC BLOOD PRESSURE: 78 MMHG | BODY MASS INDEX: 26.57 KG/M2

## 2022-11-15 DIAGNOSIS — F41.9 ANXIETY: ICD-10-CM

## 2022-11-15 DIAGNOSIS — E78.00 HYPERCHOLESTEROLEMIA: ICD-10-CM

## 2022-11-15 DIAGNOSIS — I10 PRIMARY HYPERTENSION: ICD-10-CM

## 2022-11-15 DIAGNOSIS — E11.9 TYPE 2 DIABETES MELLITUS WITHOUT COMPLICATION, WITHOUT LONG-TERM CURRENT USE OF INSULIN (HCC): Primary | ICD-10-CM

## 2022-11-15 RX ORDER — CLONAZEPAM 2 MG/1
2 TABLET ORAL 3 TIMES DAILY
Qty: 90 TABLET | Refills: 2 | Status: SHIPPED | OUTPATIENT
Start: 2022-11-15

## 2022-11-15 NOTE — PROGRESS NOTES
Assessment/Plan:  The patient did receive flu shot for the year  TSH 3 2 CBC normal A1c 5 3 LDL cholesterol 86 glucose was 99 and rest of CMP normal   The patient will along with hypertension hyperlipidemia as well as anxiety  Patient will continue with current regimen for diabetes  May consider stopping metformin  Follow-up in 3 months or as needed  patient did have flu shot for the year  Diagnoses and all orders for this visit:    Type 2 diabetes mellitus without complication, without long-term current use of insulin (HCC)    Anxiety  -     clonazePAM (KlonoPIN) 2 mg tablet; Take 1 tablet (2 mg total) by mouth 3 (three) times a day    Primary hypertension    Hypercholesterolemia            Subjective:        Patient ID: Shanae Laboy is a 49 y o  male  Patient follow-up on diabetes hypertension hyperlipidemia with history of anxiety  Patient feeling well overall  Patient does get significant work out work  The following portions of the patient's history were reviewed and updated as appropriate: allergies, current medications, past family history, past medical history, past social history, past surgical history and problem list       Review of Systems   Constitutional: Negative  HENT: Negative  Eyes: Negative  Respiratory: Negative  Cardiovascular: Negative  Gastrointestinal: Negative  Endocrine: Negative  Genitourinary: Negative  Musculoskeletal: Negative  Skin: Negative  Allergic/Immunologic: Negative  Neurological: Negative  Hematological: Negative  Psychiatric/Behavioral: Negative  Objective:               /78 (BP Location: Right arm, Patient Position: Sitting, Cuff Size: Standard)   Pulse 70   Temp (!) 97 4 °F (36 3 °C) (Temporal)   Ht 5' 11" (1 803 m)   Wt 86 1 kg (189 lb 12 8 oz)   SpO2 99%   BMI 26 47 kg/m²          Physical Exam  Vitals and nursing note reviewed     Constitutional:       General: He is not in acute distress  Appearance: Normal appearance  He is not ill-appearing, toxic-appearing or diaphoretic  HENT:      Head: Normocephalic and atraumatic  Right Ear: Tympanic membrane, ear canal and external ear normal  There is no impacted cerumen  Left Ear: Tympanic membrane, ear canal and external ear normal  There is no impacted cerumen  Nose: Nose normal  No congestion or rhinorrhea  Mouth/Throat:      Mouth: Mucous membranes are moist       Pharynx: No oropharyngeal exudate or posterior oropharyngeal erythema  Eyes:      General: No scleral icterus  Right eye: No discharge  Left eye: No discharge  Extraocular Movements: Extraocular movements intact  Conjunctiva/sclera: Conjunctivae normal       Pupils: Pupils are equal, round, and reactive to light  Neck:      Vascular: No carotid bruit  Cardiovascular:      Rate and Rhythm: Normal rate and regular rhythm  Pulses: Normal pulses  Heart sounds: Normal heart sounds  No murmur heard  No friction rub  No gallop  Pulmonary:      Effort: Pulmonary effort is normal  No respiratory distress  Breath sounds: Normal breath sounds  No stridor  No wheezing, rhonchi or rales  Chest:      Chest wall: No tenderness  Musculoskeletal:         General: No swelling, tenderness, deformity or signs of injury  Normal range of motion  Cervical back: Normal range of motion and neck supple  No rigidity  No muscular tenderness  Right lower leg: No edema  Left lower leg: No edema  Lymphadenopathy:      Cervical: No cervical adenopathy  Skin:     General: Skin is warm and dry  Capillary Refill: Capillary refill takes less than 2 seconds  Coloration: Skin is not jaundiced  Findings: No bruising, erythema, lesion or rash  Neurological:      Mental Status: He is alert and oriented to person, place, and time  Mental status is at baseline  Cranial Nerves: No cranial nerve deficit  Sensory: No sensory deficit  Motor: No weakness  Coordination: Coordination normal       Gait: Gait normal    Psychiatric:         Mood and Affect: Mood normal          Behavior: Behavior normal          Thought Content:  Thought content normal          Judgment: Judgment normal

## 2023-01-10 DIAGNOSIS — F52.21 ERECTILE DYSFUNCTION OF NON-ORGANIC ORIGIN: ICD-10-CM

## 2023-01-10 RX ORDER — VARDENAFIL 11.85 MG/1
TABLET ORAL
Qty: 6 TABLET | Refills: 1 | Status: SHIPPED | OUTPATIENT
Start: 2023-01-10

## 2023-01-25 DIAGNOSIS — E78.00 HYPERCHOLESTEROLEMIA: ICD-10-CM

## 2023-01-25 DIAGNOSIS — E11.9 TYPE 2 DIABETES MELLITUS WITHOUT COMPLICATION, WITHOUT LONG-TERM CURRENT USE OF INSULIN (HCC): ICD-10-CM

## 2023-01-25 RX ORDER — ROSUVASTATIN CALCIUM 5 MG/1
5 TABLET, COATED ORAL DAILY
Qty: 90 TABLET | Refills: 0 | Status: SHIPPED | OUTPATIENT
Start: 2023-01-25

## 2023-01-30 ENCOUNTER — OFFICE VISIT (OUTPATIENT)
Dept: FAMILY MEDICINE CLINIC | Facility: CLINIC | Age: 48
End: 2023-01-30

## 2023-01-30 VITALS
TEMPERATURE: 98.3 F | SYSTOLIC BLOOD PRESSURE: 138 MMHG | HEIGHT: 71 IN | BODY MASS INDEX: 28.17 KG/M2 | OXYGEN SATURATION: 98 % | WEIGHT: 201.2 LBS | DIASTOLIC BLOOD PRESSURE: 70 MMHG | HEART RATE: 94 BPM

## 2023-01-30 DIAGNOSIS — R22.40 SUBCUTANEOUS NODULE OF LOWER EXTREMITY: Primary | ICD-10-CM

## 2023-01-30 DIAGNOSIS — E11.9 TYPE 2 DIABETES MELLITUS WITHOUT COMPLICATION, WITHOUT LONG-TERM CURRENT USE OF INSULIN (HCC): ICD-10-CM

## 2023-01-30 NOTE — PROGRESS NOTES
Assessment/Plan:       Diagnoses and all orders for this visit:    Subcutaneous nodule of lower extremity  -     US extremity soft tissue; Future    Type 2 diabetes mellitus without complication, without long-term current use of insulin ([facility])            Subjective:        Patient ID: Rosy Minor is a 49 y o  male  Patient is here with a lump on the left lower leg over the past week  Some pain noted  No trauma        The following portions of the patient's history were reviewed and updated as appropriate: allergies, current medications, past family history, past medical history, past social history, past surgical history and problem list       Review of Systems   Constitutional: Negative  HENT: Negative  Eyes: Negative  Respiratory: Negative  Cardiovascular: Negative  Gastrointestinal: Negative  Endocrine: Negative  Genitourinary: Negative  Musculoskeletal: Negative  Skin: Positive for color change  Allergic/Immunologic: Negative  Neurological: Negative  Hematological: Negative  Psychiatric/Behavioral: Negative  Objective:      BMI Counseling: Body mass index is 28 06 kg/m²  The BMI is above normal  Nutrition recommendations include consuming healthier snacks  Exercise recommendations include exercising 3-5 times per week  Rationale for BMI follow-up plan is due to patient being overweight or obese  Depression Screening and Follow-up Plan: Patient was screened for depression during today's encounter  They screened negative with a PHQ-2 score of 0             /70 (BP Location: Right arm, Patient Position: Sitting, Cuff Size: Large)   Pulse 94   Temp 98 3 °F (36 8 °C) (Temporal)   Ht 5' 11" (1 803 m)   Wt 91 3 kg (201 lb 3 2 oz)   SpO2 98%   BMI 28 06 kg/m²          Physical Exam  Vitals and nursing note reviewed  Constitutional:       General: He is not in acute distress  Appearance: Normal appearance   He is not ill-appearing, toxic-appearing or diaphoretic  HENT:      Head: Normocephalic and atraumatic  Right Ear: Tympanic membrane, ear canal and external ear normal  There is no impacted cerumen  Left Ear: Tympanic membrane, ear canal and external ear normal  There is no impacted cerumen  Nose: Nose normal  No congestion or rhinorrhea  Mouth/Throat:      Mouth: Mucous membranes are moist       Pharynx: No oropharyngeal exudate or posterior oropharyngeal erythema  Eyes:      General: No scleral icterus  Right eye: No discharge  Left eye: No discharge  Extraocular Movements: Extraocular movements intact  Conjunctiva/sclera: Conjunctivae normal       Pupils: Pupils are equal, round, and reactive to light  Neck:      Vascular: No carotid bruit  Cardiovascular:      Rate and Rhythm: Normal rate and regular rhythm  Pulses: Normal pulses  Heart sounds: Normal heart sounds  No murmur heard  No friction rub  No gallop  Pulmonary:      Effort: Pulmonary effort is normal  No respiratory distress  Breath sounds: Normal breath sounds  No stridor  No wheezing, rhonchi or rales  Chest:      Chest wall: No tenderness  Musculoskeletal:         General: No swelling, tenderness, deformity or signs of injury  Normal range of motion  Cervical back: Normal range of motion and neck supple  No rigidity  No muscular tenderness  Right lower leg: No edema  Left lower leg: No edema  Comments: Subcutaneous nodule versus varicose vein left lower extremity laterally   Lymphadenopathy:      Cervical: No cervical adenopathy  Skin:     General: Skin is warm and dry  Capillary Refill: Capillary refill takes less than 2 seconds  Coloration: Skin is not jaundiced  Findings: No bruising, erythema, lesion or rash  Neurological:      Mental Status: He is alert and oriented to person, place, and time  Mental status is at baseline        Cranial Nerves: No cranial nerve deficit  Sensory: No sensory deficit  Motor: No weakness  Coordination: Coordination normal       Gait: Gait normal    Psychiatric:         Mood and Affect: Mood normal          Behavior: Behavior normal          Thought Content:  Thought content normal          Judgment: Judgment normal

## 2023-02-01 ENCOUNTER — HOSPITAL ENCOUNTER (OUTPATIENT)
Dept: ULTRASOUND IMAGING | Facility: MEDICAL CENTER | Age: 48
Discharge: HOME | End: 2023-02-01

## 2023-02-01 DIAGNOSIS — R22.40 SUBCUTANEOUS NODULE OF LOWER EXTREMITY: ICD-10-CM

## 2023-02-03 ENCOUNTER — TELEPHONE (OUTPATIENT)
Dept: FAMILY MEDICINE CLINIC | Facility: CLINIC | Age: 48
End: 2023-02-03

## 2023-02-03 NOTE — TELEPHONE ENCOUNTER
Patient called in for U/s results and was advised that u/s was not read yet and we would contact him once we have results  Contacted reading room and got them to get it read

## 2023-02-21 ENCOUNTER — OFFICE VISIT (OUTPATIENT)
Dept: FAMILY MEDICINE CLINIC | Facility: CLINIC | Age: 48
End: 2023-02-21

## 2023-02-21 VITALS
DIASTOLIC BLOOD PRESSURE: 72 MMHG | TEMPERATURE: 97.5 F | WEIGHT: 201.4 LBS | OXYGEN SATURATION: 96 % | BODY MASS INDEX: 28.19 KG/M2 | HEIGHT: 71 IN | SYSTOLIC BLOOD PRESSURE: 132 MMHG | HEART RATE: 63 BPM

## 2023-02-21 DIAGNOSIS — F41.1 GENERALIZED ANXIETY DISORDER: ICD-10-CM

## 2023-02-21 DIAGNOSIS — E78.00 HYPERCHOLESTEROLEMIA: ICD-10-CM

## 2023-02-21 DIAGNOSIS — I10 PRIMARY HYPERTENSION: ICD-10-CM

## 2023-02-21 DIAGNOSIS — E11.9 TYPE 2 DIABETES MELLITUS WITHOUT COMPLICATION, WITHOUT LONG-TERM CURRENT USE OF INSULIN (HCC): Primary | ICD-10-CM

## 2023-02-21 DIAGNOSIS — H35.3191 EARLY DRY STAGE NONEXUDATIVE AGE-RELATED MACULAR DEGENERATION, UNSPECIFIED LATERALITY: ICD-10-CM

## 2023-02-21 LAB
LEFT EYE DIABETIC RETINOPATHY: ABNORMAL
LEFT EYE IMAGE QUALITY: ABNORMAL
LEFT EYE MACULAR EDEMA: ABNORMAL
LEFT EYE OTHER RETINOPATHY: ABNORMAL
RIGHT EYE DIABETIC RETINOPATHY: ABNORMAL
RIGHT EYE IMAGE QUALITY: ABNORMAL
RIGHT EYE MACULAR EDEMA: ABNORMAL
RIGHT EYE OTHER RETINOPATHY: ABNORMAL
SEVERITY (EYE EXAM): ABNORMAL
SL AMB POCT HEMOGLOBIN AIC: 5.2 (ref ?–6.5)

## 2023-02-21 NOTE — PROGRESS NOTES
Assessment/Plan: A1c 5 2  TSH 3 2 CBC normal previous A1c 5 3 LDL 86 CMP normal   Patient will labs prior to next visit  Ultrasound lower extremity reviewed  Patient will observe muscular herniation  Patient asymptomatic in this regard at this time  Patient will continue with other meds per routine  Patient is on metformin daily  Patient refills given when needed  Iris test done at this time  Follow-up 3 months       Diagnoses and all orders for this visit:    Type 2 diabetes mellitus without complication, without long-term current use of insulin (HCC)  -     POCT hemoglobin A1c  -     IRIS Diabetic eye exam  -     CBC and differential; Future  -     Comprehensive metabolic panel; Future  -     Hemoglobin A1C; Future  -     Lipid panel; Future  -     Microalbumin / creatinine urine ratio  -     TSH, 3rd generation with Free T4 reflex; Future    Primary hypertension  -     CBC and differential; Future  -     Comprehensive metabolic panel; Future  -     Hemoglobin A1C; Future  -     Lipid panel; Future  -     Microalbumin / creatinine urine ratio  -     TSH, 3rd generation with Free T4 reflex; Future    Hypercholesterolemia  -     CBC and differential; Future  -     Comprehensive metabolic panel; Future  -     Hemoglobin A1C; Future  -     Lipid panel; Future  -     Microalbumin / creatinine urine ratio  -     TSH, 3rd generation with Free T4 reflex; Future    Generalized anxiety disorder            Subjective:        Patient ID: Kenna Ring is a 51 y o  male  Patient here to follow-up on diabetes hypertension hyperlipidemia  Patient also here to follow-up on left leg muscle herniation  Patient in normal state of health until eating seafood recently with butter  Patient with some diarrhea associated with this  No fevers or chills  No bloody stools  No chest pain or shortness of breath  Vision stable overall          The following portions of the patient's history were reviewed and updated as appropriate: allergies, current medications, past family history, past medical history, past social history, past surgical history and problem list       Review of Systems   Constitutional: Negative  HENT: Negative  Eyes: Negative  Respiratory: Negative  Cardiovascular: Negative  Gastrointestinal: Positive for diarrhea  Endocrine: Negative  Genitourinary: Negative  Musculoskeletal: Negative  Skin: Negative  Allergic/Immunologic: Negative  Neurological: Negative  Hematological: Negative  Psychiatric/Behavioral: Negative  Objective:      BMI Counseling: Body mass index is 28 09 kg/m²  The BMI is above normal  Nutrition recommendations include consuming healthier snacks  Exercise recommendations include exercising 3-5 times per week  Rationale for BMI follow-up plan is due to patient being overweight or obese  Depression Screening and Follow-up Plan: Clincally patient does not have depression  No treatment is required  /72 (BP Location: Right arm, Patient Position: Sitting, Cuff Size: Standard)   Pulse 63   Temp 97 5 °F (36 4 °C) (Temporal)   Ht 5' 11" (1 803 m)   Wt 91 4 kg (201 lb 6 4 oz)   SpO2 96%   BMI 28 09 kg/m²          Physical Exam  Vitals and nursing note reviewed  Constitutional:       General: He is not in acute distress  Appearance: Normal appearance  He is not ill-appearing, toxic-appearing or diaphoretic  HENT:      Head: Normocephalic and atraumatic  Right Ear: Tympanic membrane, ear canal and external ear normal  There is no impacted cerumen  Left Ear: Tympanic membrane, ear canal and external ear normal  There is no impacted cerumen  Nose: Nose normal  No congestion or rhinorrhea  Mouth/Throat:      Mouth: Mucous membranes are moist       Pharynx: No oropharyngeal exudate or posterior oropharyngeal erythema  Eyes:      General: No scleral icterus  Right eye: No discharge           Left eye: No discharge  Extraocular Movements: Extraocular movements intact  Conjunctiva/sclera: Conjunctivae normal       Pupils: Pupils are equal, round, and reactive to light  Neck:      Vascular: No carotid bruit  Cardiovascular:      Rate and Rhythm: Normal rate and regular rhythm  Pulses: Normal pulses  Heart sounds: Normal heart sounds  No murmur heard  No friction rub  No gallop  Pulmonary:      Effort: Pulmonary effort is normal  No respiratory distress  Breath sounds: Normal breath sounds  No stridor  No wheezing, rhonchi or rales  Chest:      Chest wall: No tenderness  Musculoskeletal:         General: No swelling, tenderness, deformity or signs of injury  Normal range of motion  Cervical back: Normal range of motion and neck supple  No rigidity  No muscular tenderness  Right lower leg: No edema  Left lower leg: No edema  Lymphadenopathy:      Cervical: No cervical adenopathy  Skin:     General: Skin is warm and dry  Capillary Refill: Capillary refill takes less than 2 seconds  Coloration: Skin is not jaundiced  Findings: No bruising, erythema, lesion or rash  Neurological:      Mental Status: He is alert and oriented to person, place, and time  Mental status is at baseline  Cranial Nerves: No cranial nerve deficit  Sensory: No sensory deficit  Motor: No weakness  Coordination: Coordination normal       Gait: Gait normal    Psychiatric:         Mood and Affect: Mood normal          Behavior: Behavior normal          Thought Content:  Thought content normal          Judgment: Judgment normal

## 2023-03-14 DIAGNOSIS — F41.9 ANXIETY: ICD-10-CM

## 2023-03-14 RX ORDER — CLONAZEPAM 2 MG/1
2 TABLET ORAL 3 TIMES DAILY
Qty: 90 TABLET | Refills: 2 | Status: SHIPPED | OUTPATIENT
Start: 2023-03-14

## 2023-03-14 NOTE — TELEPHONE ENCOUNTER
Patient called in stating he was seen by the Eye doctor and they want him to start on an eye drop preservision ared 2 and he was told to contact you to make sure that it does not interfere  with any other med

## 2023-05-03 DIAGNOSIS — E78.00 HYPERCHOLESTEROLEMIA: ICD-10-CM

## 2023-05-03 DIAGNOSIS — E11.9 TYPE 2 DIABETES MELLITUS WITHOUT COMPLICATION, WITHOUT LONG-TERM CURRENT USE OF INSULIN (HCC): ICD-10-CM

## 2023-05-03 RX ORDER — ROSUVASTATIN CALCIUM 5 MG/1
5 TABLET, COATED ORAL DAILY
Qty: 90 TABLET | Refills: 0 | Status: SHIPPED | OUTPATIENT
Start: 2023-05-03

## 2023-05-08 LAB — HBA1C MFR BLD HPLC: 5.2 %

## 2023-05-16 ENCOUNTER — OFFICE VISIT (OUTPATIENT)
Dept: FAMILY MEDICINE CLINIC | Facility: CLINIC | Age: 48
End: 2023-05-16

## 2023-05-16 VITALS
HEART RATE: 78 BPM | WEIGHT: 201.4 LBS | DIASTOLIC BLOOD PRESSURE: 68 MMHG | TEMPERATURE: 98.2 F | OXYGEN SATURATION: 97 % | HEIGHT: 71 IN | SYSTOLIC BLOOD PRESSURE: 112 MMHG | BODY MASS INDEX: 28.19 KG/M2

## 2023-05-16 DIAGNOSIS — I10 PRIMARY HYPERTENSION: ICD-10-CM

## 2023-05-16 DIAGNOSIS — F52.21 ERECTILE DYSFUNCTION OF NON-ORGANIC ORIGIN: ICD-10-CM

## 2023-05-16 DIAGNOSIS — E11.9 TYPE 2 DIABETES MELLITUS WITHOUT COMPLICATION, WITHOUT LONG-TERM CURRENT USE OF INSULIN (HCC): Primary | ICD-10-CM

## 2023-05-16 DIAGNOSIS — E78.00 HYPERCHOLESTEROLEMIA: ICD-10-CM

## 2023-05-16 RX ORDER — VARDENAFIL 11.85 MG/1
TABLET ORAL
Qty: 4 TABLET | Refills: 11 | Status: SHIPPED | OUTPATIENT
Start: 2023-05-16

## 2023-05-16 NOTE — PROGRESS NOTES
Assessment/Plan: CBC normal CMP normal A1c 5 2 LDL 99  TSH 3 3  Monofilament test done at this time which was normal   Patient up-to-date with ophthalmologic exam   Patient is going to stop metformin due to diabetes being very well controlled with diet  Patient will continue with current regimen for hypertension and hyperlipidemia  Refills will be given when needed  Patient will follow-up in 3 months  Patient will have labs prior to next visit  Diagnoses and all orders for this visit:    Type 2 diabetes mellitus without complication, without long-term current use of insulin ([facility])  -     Albumin / creatinine urine ratio; Future  -     Hemoglobin A1C; Future    Primary hypertension    Hypercholesterolemia  -     Lipid panel; Future    Erectile dysfunction of non-organic origin  -     Vardenafil HCl 10 MG TBDP; 1 tablet daily as needed  Other orders  -     Multiple Vitamins-Minerals (VISION-JONAS PRESERVE PO); Take by mouth            Subjective:        Patient ID: Amira Jose is a 51 y o  male  Patient is here to follow-up on diabetes hypertension hyperlipidemia  Patient feeling well at this time  Patient status post having laboratory studies done  No chest pain shortness of breath or abdominal pain or problems urinating or defecating or headache or visual disturbance  No foot related issues at this time  The following portions of the patient's history were reviewed and updated as appropriate: allergies, current medications, past family history, past medical history, past social history, past surgical history and problem list       Review of Systems   Constitutional: Negative  HENT: Negative  Eyes: Negative  Respiratory: Negative  Cardiovascular: Negative  Gastrointestinal: Negative  Endocrine: Negative  Genitourinary: Negative  Musculoskeletal: Negative  Skin: Negative  Allergic/Immunologic: Negative  Neurological: Negative      Hematological: "Negative  Psychiatric/Behavioral: Negative  Objective:               /68 (BP Location: Right arm, Patient Position: Sitting, Cuff Size: Standard)   Pulse 78   Temp 98 2 °F (36 8 °C) (Temporal)   Ht 5' 11\" (1 803 m)   Wt 91 4 kg (201 lb 6 4 oz)   SpO2 97%   BMI 28 09 kg/m²          Physical Exam  Vitals and nursing note reviewed  Constitutional:       General: He is not in acute distress  Appearance: Normal appearance  He is not ill-appearing, toxic-appearing or diaphoretic  HENT:      Head: Normocephalic and atraumatic  Right Ear: Tympanic membrane, ear canal and external ear normal  There is no impacted cerumen  Left Ear: Tympanic membrane, ear canal and external ear normal  There is no impacted cerumen  Nose: Nose normal  No congestion or rhinorrhea  Mouth/Throat:      Mouth: Mucous membranes are moist       Pharynx: No oropharyngeal exudate or posterior oropharyngeal erythema  Eyes:      General: No scleral icterus  Right eye: No discharge  Left eye: No discharge  Extraocular Movements: Extraocular movements intact  Conjunctiva/sclera: Conjunctivae normal       Pupils: Pupils are equal, round, and reactive to light  Neck:      Vascular: No carotid bruit  Cardiovascular:      Rate and Rhythm: Normal rate and regular rhythm  Pulses: Normal pulses  Heart sounds: Normal heart sounds  No murmur heard  No friction rub  No gallop  Pulmonary:      Effort: Pulmonary effort is normal  No respiratory distress  Breath sounds: Normal breath sounds  No stridor  No wheezing, rhonchi or rales  Chest:      Chest wall: No tenderness  Musculoskeletal:         General: No swelling, tenderness, deformity or signs of injury  Normal range of motion  Cervical back: Normal range of motion and neck supple  No rigidity  No muscular tenderness  Right lower leg: No edema  Left lower leg: No edema     Lymphadenopathy:     " Cervical: No cervical adenopathy  Skin:     General: Skin is warm and dry  Capillary Refill: Capillary refill takes less than 2 seconds  Coloration: Skin is not jaundiced  Findings: No bruising, erythema, lesion or rash  Neurological:      Mental Status: He is alert and oriented to person, place, and time  Mental status is at baseline  Cranial Nerves: No cranial nerve deficit  Sensory: No sensory deficit  Motor: No weakness  Coordination: Coordination normal       Gait: Gait normal    Psychiatric:         Mood and Affect: Mood normal          Behavior: Behavior normal          Thought Content:  Thought content normal          Judgment: Judgment normal

## 2023-06-16 DIAGNOSIS — E78.00 HYPERCHOLESTEROLEMIA: ICD-10-CM

## 2023-06-19 RX ORDER — ROSUVASTATIN CALCIUM 5 MG/1
5 TABLET, COATED ORAL DAILY
Qty: 90 TABLET | Refills: 1 | Status: SHIPPED | OUTPATIENT
Start: 2023-06-19

## 2023-07-18 ENCOUNTER — OFFICE VISIT (OUTPATIENT)
Dept: FAMILY MEDICINE CLINIC | Facility: CLINIC | Age: 48
End: 2023-07-18
Payer: COMMERCIAL

## 2023-07-18 VITALS
BODY MASS INDEX: 28.14 KG/M2 | SYSTOLIC BLOOD PRESSURE: 120 MMHG | HEIGHT: 71 IN | OXYGEN SATURATION: 98 % | DIASTOLIC BLOOD PRESSURE: 80 MMHG | WEIGHT: 201 LBS | TEMPERATURE: 97.6 F | HEART RATE: 78 BPM

## 2023-07-18 DIAGNOSIS — S39.012A LUMBAR STRAIN, INITIAL ENCOUNTER: Primary | ICD-10-CM

## 2023-07-18 PROCEDURE — 99213 OFFICE O/P EST LOW 20 MIN: CPT | Performed by: FAMILY MEDICINE

## 2023-07-18 PROCEDURE — 96372 THER/PROPH/DIAG INJ SC/IM: CPT | Performed by: FAMILY MEDICINE

## 2023-07-18 RX ORDER — METHYLPREDNISOLONE ACETATE 40 MG/ML
40 INJECTION, SUSPENSION INTRA-ARTICULAR; INTRALESIONAL; INTRAMUSCULAR; SOFT TISSUE ONCE
Status: COMPLETED | OUTPATIENT
Start: 2023-07-18 | End: 2023-07-18

## 2023-07-18 RX ADMIN — METHYLPREDNISOLONE ACETATE 40 MG: 40 INJECTION, SUSPENSION INTRA-ARTICULAR; INTRALESIONAL; INTRAMUSCULAR; SOFT TISSUE at 15:36

## 2023-07-18 NOTE — PROGRESS NOTES
Assessment/Plan: Patient have Depo-Medrol 40 mg IM at this time. Diagnoses and all orders for this visit:    Lumbar strain, initial encounter            Subjective:        Patient ID: Claudia Sterling is a 51 y.o. male.  Patient is here with left lumbar pain over the past 2 weeks. No direct trauma noted. Patient to see chiropractor x2. Patient has had injections in the past of steroids with good results. Patient did have some adjustments with chiropractor with some improvement. Patient to use ice and Motrin. Patient still with ongoing pain. No saddle anesthesia. No radicular symptoms at this time. No problems urinating or defecating. No fevers or rash noted. The following portions of the patient's history were reviewed and updated as appropriate: allergies, current medications, past family history, past medical history, past social history, past surgical history and problem list.      Review of Systems   Constitutional: Negative. HENT: Negative. Eyes: Negative. Respiratory: Negative. Cardiovascular: Negative. Gastrointestinal: Negative. Endocrine: Negative. Genitourinary: Negative. Musculoskeletal: Positive for back pain. Skin: Negative. Allergic/Immunologic: Negative. Neurological: Negative. Hematological: Negative. Psychiatric/Behavioral: Negative. Objective:               /80   Pulse 78   Temp 97.6 °F (36.4 °C)   Ht 5' 11" (1.803 m)   Wt 91.2 kg (201 lb)   SpO2 98%   BMI 28.03 kg/m²          Physical Exam  Vitals and nursing note reviewed. Constitutional:       General: He is not in acute distress. Appearance: Normal appearance. He is not ill-appearing, toxic-appearing or diaphoretic. Musculoskeletal:         General: Tenderness present. Comments: Pain to palpation left lower lumbar region. Neurological:      Mental Status: He is alert.

## 2023-07-28 ENCOUNTER — TELEPHONE (OUTPATIENT)
Dept: FAMILY MEDICINE CLINIC | Facility: CLINIC | Age: 48
End: 2023-07-28

## 2023-07-28 DIAGNOSIS — S39.012A LUMBAR STRAIN, INITIAL ENCOUNTER: Primary | ICD-10-CM

## 2023-07-28 RX ORDER — METHOCARBAMOL 500 MG/1
500 TABLET, FILM COATED ORAL 4 TIMES DAILY PRN
Qty: 60 TABLET | Refills: 0 | Status: SHIPPED | OUTPATIENT
Start: 2023-07-28

## 2023-07-28 NOTE — TELEPHONE ENCOUNTER
Patient was seen on 7/18/23 and had an injection for pain in his back, declined muscle relaxers at the time, but now has decided to try something. Please advise.

## 2023-08-03 ENCOUNTER — TELEPHONE (OUTPATIENT)
Dept: FAMILY MEDICINE CLINIC | Facility: CLINIC | Age: 48
End: 2023-08-03

## 2023-08-03 NOTE — TELEPHONE ENCOUNTER
Patient was seen for back pain on 7/18/23 and had injection by Dr Sam Sargent, and was also prescribed muscle relaxer (methocarbamol) on 7/28/23. Patient states its not helping and is asking for something for pain.

## 2023-08-03 NOTE — TELEPHONE ENCOUNTER
Patient notified, declined appt with Dr Wendy Friend or other providers, patient stated he will go to urgent care.

## 2023-08-08 ENCOUNTER — APPOINTMENT (OUTPATIENT)
Dept: RADIOLOGY | Facility: MEDICAL CENTER | Age: 48
End: 2023-08-08
Payer: COMMERCIAL

## 2023-08-08 DIAGNOSIS — M99.01 SOMATIC DYSFUNCTION OF SPINE, CERVICAL: ICD-10-CM

## 2023-08-08 DIAGNOSIS — M99.04 SOMATIC DYSFUNCTION OF SACRAL REGION: ICD-10-CM

## 2023-08-08 DIAGNOSIS — M51.36 DEGENERATION OF LUMBAR INTERVERTEBRAL DISC: ICD-10-CM

## 2023-08-08 DIAGNOSIS — E78.00 HYPERCHOLESTEROLEMIA: ICD-10-CM

## 2023-08-08 DIAGNOSIS — M99.02 SOMATIC DYSFUNCTION OF SPINE, THORACIC: ICD-10-CM

## 2023-08-08 PROCEDURE — 72100 X-RAY EXAM L-S SPINE 2/3 VWS: CPT

## 2023-08-08 PROCEDURE — 72170 X-RAY EXAM OF PELVIS: CPT

## 2023-08-08 RX ORDER — ROSUVASTATIN CALCIUM 5 MG/1
5 TABLET, COATED ORAL DAILY
Qty: 90 TABLET | Refills: 1 | Status: SHIPPED | OUTPATIENT
Start: 2023-08-08

## 2023-08-14 ENCOUNTER — OFFICE VISIT (OUTPATIENT)
Dept: FAMILY MEDICINE CLINIC | Facility: CLINIC | Age: 48
End: 2023-08-14
Payer: COMMERCIAL

## 2023-08-14 VITALS
WEIGHT: 199.2 LBS | OXYGEN SATURATION: 99 % | SYSTOLIC BLOOD PRESSURE: 126 MMHG | TEMPERATURE: 97.5 F | HEIGHT: 71 IN | BODY MASS INDEX: 27.89 KG/M2 | DIASTOLIC BLOOD PRESSURE: 82 MMHG | HEART RATE: 75 BPM

## 2023-08-14 DIAGNOSIS — M54.16 LUMBAR RADICULITIS: Primary | ICD-10-CM

## 2023-08-14 DIAGNOSIS — E11.9 TYPE 2 DIABETES MELLITUS WITHOUT COMPLICATION, WITHOUT LONG-TERM CURRENT USE OF INSULIN (HCC): ICD-10-CM

## 2023-08-14 DIAGNOSIS — I10 PRIMARY HYPERTENSION: ICD-10-CM

## 2023-08-14 PROCEDURE — 99214 OFFICE O/P EST MOD 30 MIN: CPT | Performed by: FAMILY MEDICINE

## 2023-08-14 RX ORDER — MELOXICAM 15 MG/1
15 TABLET ORAL DAILY
Qty: 30 TABLET | Refills: 1 | Status: SHIPPED | OUTPATIENT
Start: 2023-08-14 | End: 2023-08-21

## 2023-08-14 NOTE — PROGRESS NOTES
Assessment/Plan: Patient will start physical therapy as directed. Patient did do 10 treatments with chiropractor. Patient will go for MRI lumbar spine given lumbar radiculitis/radiculopathy. Symptoms going on for greater than 6 weeks. Blood pressure stable at this time. Patient will continue with methocarbamol and will introduce meloxicam.  Patient may use Tylenol as needed. Patient will follow-up in 2 weeks. Diagnoses and all orders for this visit:    Lumbar radiculitis  -     MRI lumbar spine wo contrast; Future  -     Ambulatory Referral to Physical Therapy; Future  -     meloxicam (MOBIC) 15 mg tablet; Take 1 tablet (15 mg total) by mouth daily    Type 2 diabetes mellitus without complication, without long-term current use of insulin ([address])    Primary hypertension            Subjective:        Patient ID: Latha Figueroa is a 51 y.o. male. Patient is here with ongoing back pain lumbar region. Patient with radicular symptoms in the left calf and thigh. Patient was using muscle relaxer as well as Motrin and Tylenol. Patient does have pain with forward flexion. Patient has seen chiropractor 9-10 times. Some muscle spasms noted. Normal urination and defecation. Patient is using lift in the right shoe. Patient has had pain since July 1. The following portions of the patient's history were reviewed and updated as appropriate: allergies, current medications, past family history, past medical history, past social history, past surgical history and problem list.      Review of Systems   Constitutional: Negative. HENT: Negative. Eyes: Negative. Respiratory: Negative. Cardiovascular: Negative. Gastrointestinal: Negative. Endocrine: Negative. Genitourinary: Negative. Musculoskeletal: Positive for back pain and gait problem. Skin: Negative. Allergic/Immunologic: Negative. Hematological: Negative. Psychiatric/Behavioral: Negative.             Objective: /82 (BP Location: Right arm, Patient Position: Sitting, Cuff Size: Standard)   Pulse 75   Temp 97.5 °F (36.4 °C) (Temporal)   Ht 5' 11" (1.803 m)   Wt 90.4 kg (199 lb 3.2 oz)   SpO2 99%   BMI 27.78 kg/m²          Physical Exam  Vitals and nursing note reviewed. Constitutional:       General: He is not in acute distress. Appearance: He is not toxic-appearing or diaphoretic. Comments: In pain   HENT:      Head: Normocephalic and atraumatic. Musculoskeletal:         General: Tenderness present. Comments: Pain with palpation left buttocks/thigh. Positive straight leg raise on the left and right affecting left leg. Skin:     Capillary Refill: Capillary refill takes less than 2 seconds. Neurological:      Mental Status: He is alert.

## 2023-08-18 ENCOUNTER — HOSPITAL ENCOUNTER (OUTPATIENT)
Dept: MRI IMAGING | Facility: HOSPITAL | Age: 48
Discharge: HOME | End: 2023-08-18
Payer: COMMERCIAL

## 2023-08-18 DIAGNOSIS — M54.16 LUMBAR RADICULITIS: ICD-10-CM

## 2023-08-18 PROCEDURE — 72148 MRI LUMBAR SPINE W/O DYE: CPT

## 2023-08-18 PROCEDURE — G1004 CDSM NDSC: HCPCS

## 2023-08-21 ENCOUNTER — OFFICE VISIT (OUTPATIENT)
Dept: FAMILY MEDICINE CLINIC | Facility: CLINIC | Age: 48
End: 2023-08-21
Payer: COMMERCIAL

## 2023-08-21 ENCOUNTER — DOCUMENTATION (OUTPATIENT)
Dept: FAMILY MEDICINE CLINIC | Facility: CLINIC | Age: 48
End: 2023-08-21

## 2023-08-21 VITALS
HEIGHT: 71 IN | TEMPERATURE: 99 F | HEART RATE: 98 BPM | SYSTOLIC BLOOD PRESSURE: 140 MMHG | BODY MASS INDEX: 27.86 KG/M2 | WEIGHT: 199 LBS | OXYGEN SATURATION: 100 % | DIASTOLIC BLOOD PRESSURE: 92 MMHG

## 2023-08-21 DIAGNOSIS — E11.9 TYPE 2 DIABETES MELLITUS WITHOUT COMPLICATION, WITHOUT LONG-TERM CURRENT USE OF INSULIN (HCC): ICD-10-CM

## 2023-08-21 DIAGNOSIS — M54.16 LUMBAR RADICULITIS: Primary | ICD-10-CM

## 2023-08-21 PROCEDURE — 99214 OFFICE O/P EST MOD 30 MIN: CPT | Performed by: FAMILY MEDICINE

## 2023-08-21 RX ORDER — PREDNISONE 10 MG/1
TABLET ORAL
Qty: 45 TABLET | Refills: 0 | Status: SHIPPED | OUTPATIENT
Start: 2023-08-21

## 2023-08-21 NOTE — PROGRESS NOTES
Assessment/Plan: Patient is prednisone taper as directed. Awaiting MRI results from Saturday. X-ray reviewed. Patient will start physical therapy tomorrow. Note for patient to be out of work till next Wednesday when he may return. This may need to be adjusted accordingly based on MRI results. Referral to pain management       Diagnoses and all orders for this visit:    Lumbar radiculitis  -     predniSONE 10 mg tablet; 5 pills daily for 3 days, 4 for 3 days, 3 for 3 days, 2 for 3 days, 1 for 3 days. -     Ambulatory referral to Spine & Pain Management; Future    Type 2 diabetes mellitus without complication, without long-term current use of insulin ([facility]ly [facility])            Subjective:        Patient ID: Gian Basilio is a 51 y.o. male. Patient is here to follow-up on left lumbar radiculopathy. Patient has been rating into the left buttocks as well as posterior thigh and calf. No significant change urination or defecation. No fevers. No rash. Patient will be seeing PT tomorrow. Patient to go for MRI on Saturday. The following portions of the patient's history were reviewed and updated as appropriate: allergies, current medications, past family history, past medical history, past social history, past surgical history and problem list.      Review of Systems   Constitutional: Negative. HENT: Negative. Eyes: Negative. Respiratory: Negative. Cardiovascular: Negative. Gastrointestinal: Negative. Endocrine: Negative. Genitourinary: Negative. Musculoskeletal: Positive for arthralgias, back pain and gait problem. Skin: Negative. Allergic/Immunologic: Negative. Neurological: Positive for numbness. Hematological: Negative. Psychiatric/Behavioral: Negative.             Objective:               /92 (BP Location: Right arm, Patient Position: Standing, Cuff Size: Standard)   Pulse 98   Temp 99 °F (37.2 °C) (Temporal)   Ht 5' 11" (1.803 m)   Wt 90.3 kg (199 lb)   SpO2 100%   BMI 27.75 kg/m²          Physical Exam  Vitals and nursing note reviewed. Constitutional:       General: He is not in acute distress. Appearance: Normal appearance. He is not ill-appearing, toxic-appearing or diaphoretic. HENT:      Head: Normocephalic and atraumatic. Nose: Nose normal. No congestion or rhinorrhea. Neck:      Vascular: No carotid bruit. Cardiovascular:      Rate and Rhythm: Normal rate and regular rhythm. Pulses: Normal pulses. Heart sounds: Normal heart sounds. No murmur heard. No friction rub. No gallop. Pulmonary:      Effort: Pulmonary effort is normal. No respiratory distress. Breath sounds: Normal breath sounds. No stridor. No wheezing, rhonchi or rales. Chest:      Chest wall: No tenderness. Musculoskeletal:         General: Tenderness present. No swelling, deformity or signs of injury. Cervical back: Normal range of motion and neck supple. No rigidity. No muscular tenderness. Right lower leg: No edema. Left lower leg: No edema. Comments: Left lower L5-S1 discomfort with palpation. Positive straight leg raise on the left. Lymphadenopathy:      Cervical: No cervical adenopathy. Skin:     General: Skin is warm and dry. Capillary Refill: Capillary refill takes less than 2 seconds. Coloration: Skin is not jaundiced. Findings: No bruising, erythema, lesion or rash. Neurological:      Mental Status: He is alert and oriented to person, place, and time. Mental status is at baseline. Psychiatric:         Mood and Affect: Mood normal.         Behavior: Behavior normal.         Thought Content:  Thought content normal.         Judgment: Judgment normal.

## 2023-08-22 ENCOUNTER — EVALUATION (OUTPATIENT)
Dept: PHYSICAL THERAPY | Facility: MEDICAL CENTER | Age: 48
End: 2023-08-22
Payer: COMMERCIAL

## 2023-08-22 DIAGNOSIS — F52.21 ERECTILE DYSFUNCTION OF NON-ORGANIC ORIGIN: ICD-10-CM

## 2023-08-22 DIAGNOSIS — L30.9 DERMATITIS: Primary | ICD-10-CM

## 2023-08-22 DIAGNOSIS — M54.16 LUMBAR RADICULITIS: Primary | ICD-10-CM

## 2023-08-22 PROCEDURE — 97161 PT EVAL LOW COMPLEX 20 MIN: CPT | Performed by: PHYSICAL THERAPIST

## 2023-08-22 RX ORDER — MOMETASONE FUROATE 1 MG/G
CREAM TOPICAL DAILY
Qty: 45 G | Refills: 2 | Status: SHIPPED | OUTPATIENT
Start: 2023-08-22

## 2023-08-22 NOTE — PROGRESS NOTES
PT Evaluation     Today's date: 2023  Patient name: Leonardo Cooley  : 1975  MRN: [id number]  Referring provider: Marcelle Monterroso DO  Dx:   Encounter Diagnosis     ICD-10-CM    1. Lumbar radiculitis  M54.16 Ambulatory Referral to Physical Therapy                     Assessment  Assessment details: Leonardo Cooley is a 51 y.o. male was evaluated on 2023  for Lumbar radiculitis  (primary encounter diagnosis). Leonardo Cooley has the above listed impairments resulting in functional deficits and negative impact to quality of life. Patient is appropriate for skilled PT intervention to promote maximal return to function and patient specific goals. Patient agrees with outlined treatment plan and all questions were answered to their satisfaction. Impairments: abnormal muscle firing, abnormal muscle tone, abnormal or restricted ROM, impaired physical strength, lacks appropriate home exercise program and pain with function  Understanding of Dx/Px/POC: good   Prognosis: good    Goals  Patient will successfully transition to home exercise program.  Patient will be able to manage symptoms independently.     Kimmy Reza will centralize symptoms to hip or low back   Kimmy Reza will report no limitation in bending and lifting  Kimmy Reza will return to work     Plan  Patient would benefit from: skilled PT  Referral necessary: No  Planned modality interventions: thermotherapy: hydrocollator packs  Planned therapy interventions: home exercise program, manual therapy, neuromuscular re-education, patient education, functional ROM exercises, strengthening, stretching, joint mobilization, graded activity, graded exercise, therapeutic exercise, body mechanics training, motor coordination training and activity modification  Frequency: 2x week  Duration in weeks: 12  Treatment plan discussed with: patient        Subjective Evaluation    History of Present Illness  Mechanism of injury: Leonardo Cooley is a 51 y.o. male presenting to therapy with complaints of left leg pain secondary to lumbar radiculopathy. He reports pain began in his back around ,then over the course of weeks it progressed to leg and less so in back. He reports posterior leg and thigh pain, sometimes to calf as well with sitting positions. Currently on Medrol dose pack since yesterday. Had MRI last week, no formal read yet. Patient Goals  Patient goals for therapy: decreased pain, increased motion, return to sport/leisure activities, independence with ADLs/IADLs and increased strength    Pain  Current pain ratin  At best pain rating: 3  At worst pain ratin  Quality: dull ache, discomfort, tight and radiating          Objective   Red Flag Screening:  History Cancer (-)  Bowl/Bladder dysfunction (-)  Night pain (-)  Unexplained weight loss (-)     Dermatomes: WNL   Myotomes:   WNL    Reflexes:   Patellar R 2+, L 2+  Achilles R 2+, L 2+      Lumbar:  AROM:   Flexion Moderate restriction,pulling down right leg   Extension WFL  Side bending Restricted L   Rotation WFL    Repeated Motion Testing: Repeated extension peripheralized symptoms    Active Motion Observations: Unremarkable       PAIVM: Comparable sign L5/S1   Symptoms consistent with L5/S1 radiculopathy, seems to correlate with MRI     Special Tests:   Crossed SLR (-), SLR (-) Prone instability (-)   Neural Dynamic Testing: Tibial Bias (-), Peroneal Bias (-)   Slump Testing (+ with and without axial compression       Hip   WFL  Special Tests: DIOGENES (-), FADIR (-)                           Precautions: None      Manuals                                                                 Neuro Re-Ed                                                                                                        Ther Ex             Lumbar R sideling gapping             Supine nerve glide                                                                                           Ther Activity Gait Training                                       Modalities             Traction Prone  90#

## 2023-08-23 LAB — HBA1C MFR BLD HPLC: 5.6 %

## 2023-08-25 ENCOUNTER — OFFICE VISIT (OUTPATIENT)
Dept: PHYSICAL THERAPY | Facility: MEDICAL CENTER | Age: 48
End: 2023-08-25
Payer: COMMERCIAL

## 2023-08-25 DIAGNOSIS — M54.16 LUMBAR RADICULITIS: Primary | ICD-10-CM

## 2023-08-25 PROCEDURE — 97140 MANUAL THERAPY 1/> REGIONS: CPT | Performed by: PHYSICAL THERAPIST

## 2023-08-25 PROCEDURE — 97110 THERAPEUTIC EXERCISES: CPT | Performed by: PHYSICAL THERAPIST

## 2023-08-25 NOTE — PROGRESS NOTES
Daily Note     Today's date: 2023  Patient name: Minh Shipley  : 1975  MRN: [id number]  Referring provider: Shantal Kruger DO  Dx:   Encounter Diagnosis     ICD-10-CM    1. Lumbar radiculitis  M54.16                      Subjective: Santos Wheeler reports that he continues to have good and bad days, Pain at best in back and hip, at worst in back hip and knee/calf       Objective: See treatment diary below      Assessment: Tolerated treatment well. Patient tolerated lumbar mobilizations well without peripheralizing symptoms, extensions able to be performed without worsening of symptoms. He will continue with these for home, also pursuing pain management for hospitals [facility] consult. Plan: Continue per plan of care.       Precautions: None      Manuals                                                                 Neuro Re-Ed                                                                                                        Ther Ex             Standing extension vs wall 2X10            Supine nerve glide             Prone press 2X10                                                                             Ther Activity                                       Gait Training                                       Modalities             Traction Prone  90#

## 2023-08-28 ENCOUNTER — OFFICE VISIT (OUTPATIENT)
Dept: PHYSICAL THERAPY | Facility: MEDICAL CENTER | Age: 48
End: 2023-08-28
Payer: COMMERCIAL

## 2023-08-28 DIAGNOSIS — M54.16 LUMBAR RADICULITIS: Primary | ICD-10-CM

## 2023-08-28 PROCEDURE — 97110 THERAPEUTIC EXERCISES: CPT | Performed by: PHYSICAL THERAPIST

## 2023-08-28 PROCEDURE — 97140 MANUAL THERAPY 1/> REGIONS: CPT | Performed by: PHYSICAL THERAPIST

## 2023-08-28 NOTE — PROGRESS NOTES
Daily Note     Today's date: 2023  Patient name: Obie Solitario  : 1975  MRN: [id number]  Referring provider: Arnulfo Silva DO  Dx:   Encounter Diagnosis     ICD-10-CM    1. Lumbar radiculitis  M54.16                      Subjective: Loretta Ortez reports that he continues to have good and bad days, Pain at best in back and hip, at worst in back hip and knee/calf       Objective: See treatment diary below      Assessment: Tolerated treatment well. Patient tolerated lumbar mobilizations well without peripheralizing symptoms, extensions able to be performed without worsening of symptoms. He will continue with these for home, also pursuing pain management for [facility] consult. Plan: Continue per plan of care.       Precautions: None      Manuals                                                                 Neuro Re-Ed                                                                                                        Ther Ex             Standing extension vs wall 2X10            Supine nerve glide             Prone press 2X10                                                                             Ther Activity                                       Gait Training                                       Modalities             Traction Prone  90#

## 2023-08-29 ENCOUNTER — OFFICE VISIT (OUTPATIENT)
Dept: FAMILY MEDICINE CLINIC | Facility: CLINIC | Age: 48
End: 2023-08-29
Payer: COMMERCIAL

## 2023-08-29 VITALS
RESPIRATION RATE: 21 BRPM | HEIGHT: 71 IN | BODY MASS INDEX: 27.47 KG/M2 | DIASTOLIC BLOOD PRESSURE: 90 MMHG | HEART RATE: 120 BPM | SYSTOLIC BLOOD PRESSURE: 132 MMHG | OXYGEN SATURATION: 98 % | WEIGHT: 196.2 LBS | TEMPERATURE: 98.2 F

## 2023-08-29 DIAGNOSIS — S39.012A LUMBAR STRAIN, INITIAL ENCOUNTER: ICD-10-CM

## 2023-08-29 DIAGNOSIS — E78.00 HYPERCHOLESTEROLEMIA: ICD-10-CM

## 2023-08-29 DIAGNOSIS — Z12.12 ENCOUNTER FOR COLORECTAL CANCER SCREENING: ICD-10-CM

## 2023-08-29 DIAGNOSIS — E11.9 TYPE 2 DIABETES MELLITUS WITHOUT COMPLICATION, WITHOUT LONG-TERM CURRENT USE OF INSULIN (HCC): Primary | ICD-10-CM

## 2023-08-29 DIAGNOSIS — Z12.11 ENCOUNTER FOR COLORECTAL CANCER SCREENING: ICD-10-CM

## 2023-08-29 DIAGNOSIS — I10 PRIMARY HYPERTENSION: ICD-10-CM

## 2023-08-29 PROCEDURE — 99214 OFFICE O/P EST MOD 30 MIN: CPT | Performed by: FAMILY MEDICINE

## 2023-08-29 RX ORDER — GABAPENTIN 300 MG/1
300 CAPSULE ORAL 3 TIMES DAILY
Qty: 90 CAPSULE | Refills: 2 | Status: SHIPPED | OUTPATIENT
Start: 2023-08-29 | End: 2023-09-05 | Stop reason: SDUPTHER

## 2023-08-29 RX ORDER — MELOXICAM 15 MG/1
15 TABLET ORAL DAILY
COMMUNITY

## 2023-08-29 NOTE — LETTER
August 29, 2023     Patient: Lincoln Schaumann  YOB: 1975  Date of Visit: 8/29/2023      To Whom it May Concern:    Genoveva Garcia is under my professional care. Missy Angel was seen in my office on 8/29/2023. Missy Ortiz will return to work on 09/06/2023. If you have any questions or concerns, please don't hesitate to call.          Sincerely,          Ryan Naranjo, DO

## 2023-08-29 NOTE — PROGRESS NOTES
Assessment/Plan: A1c 5.6. Total cholesterol 177 with an LDL of 96. HDL 48. Patient will continue with current regimen for diabetes hypertension hyperlipidemia. Patient status post steroids. Patient in pain secondary to lumbar radiculopathy. Reevaluate blood pressure at follow-up. Patient will continue to modify diet appropriately. Patient will be seeing pain management on Thursday. Patient may continue with physical therapy/chiropractor. Patient will continue with prednisone taper. Patient up-to-date with recent ophthalmology. Patient was seen in March. Follow-up in 3 months. Note to remain out of work for 1 week. Follow-up in 1 week       Diagnoses and all orders for this visit:    Type 2 diabetes mellitus without complication, without long-term current use of insulin (HCC)  -     Albumin / creatinine urine ratio; Future    Encounter for colorectal cancer screening  -     Cologuard    Primary hypertension    Hypercholesterolemia    Lumbar strain, initial encounter  -     gabapentin (Neurontin) 300 mg capsule; Take 1 capsule (300 mg total) by mouth 3 (three) times a day    Other orders  -     meloxicam (MOBIC) 15 mg tablet; Take 15 mg by mouth daily            Subjective:        Patient ID: Kiko Bull is a 51 y.o. male. Patient is here to follow-up on diabetes hypertension hyperlipidemia. Patient also with ongoing lumbar radiculopathy/sciatica on the left. Patient is seeing chiropractor. No chest pain shortness of breath or change urination or defecation or visual disturbance. Patient did have laboratory studies done. The following portions of the patient's history were reviewed and updated as appropriate: allergies, current medications, past family history, past medical history, past social history, past surgical history and problem list.      Review of Systems   Constitutional: Negative. HENT: Negative. Eyes: Negative. Respiratory: Negative. Cardiovascular: Negative. Gastrointestinal: Negative. Endocrine: Negative. Genitourinary: Negative. Musculoskeletal: Positive for back pain. Skin: Negative. Allergic/Immunologic: Negative. Neurological: Negative. Hematological: Negative. Psychiatric/Behavioral: Negative. Objective:               /90 (BP Location: Left arm, Patient Position: Standing, Cuff Size: Standard)   Pulse (!) 120   Temp 98.2 °F (36.8 °C) (Temporal)   Resp 21   Ht 5' 11" (1.803 m)   Wt 89 kg (196 lb 3.2 oz)   SpO2 98%   BMI 27.36 kg/m²          Physical Exam  Vitals and nursing note reviewed. Constitutional:       General: He is not in acute distress. Appearance: Normal appearance. He is not ill-appearing, toxic-appearing or diaphoretic. HENT:      Head: Normocephalic and atraumatic. Right Ear: Tympanic membrane, ear canal and external ear normal. There is no impacted cerumen. Left Ear: Tympanic membrane, ear canal and external ear normal. There is no impacted cerumen. Nose: Nose normal. No congestion or rhinorrhea. Mouth/Throat:      Mouth: Mucous membranes are moist.      Pharynx: No oropharyngeal exudate or posterior oropharyngeal erythema. Eyes:      General: No scleral icterus. Right eye: No discharge. Left eye: No discharge. Neck:      Vascular: No carotid bruit. Cardiovascular:      Rate and Rhythm: Normal rate and regular rhythm. Pulses: Normal pulses. Heart sounds: Normal heart sounds. No murmur heard. No friction rub. No gallop. Pulmonary:      Effort: Pulmonary effort is normal. No respiratory distress. Breath sounds: Normal breath sounds. No stridor. No wheezing, rhonchi or rales. Chest:      Chest wall: No tenderness. Musculoskeletal:         General: Tenderness present. No swelling, deformity or signs of injury. Cervical back: Normal range of motion and neck supple. No rigidity. No muscular tenderness.       Right lower leg: No edema. Left lower leg: No edema. Lymphadenopathy:      Cervical: No cervical adenopathy. Skin:     General: Skin is warm and dry. Capillary Refill: Capillary refill takes less than 2 seconds. Coloration: Skin is not jaundiced. Findings: No bruising, erythema, lesion or rash. Neurological:      Mental Status: He is alert and oriented to person, place, and time. Cranial Nerves: No cranial nerve deficit. Sensory: No sensory deficit. Motor: No weakness. Coordination: Coordination normal.      Gait: Gait abnormal.   Psychiatric:         Mood and Affect: Mood normal.         Behavior: Behavior normal.         Thought Content:  Thought content normal.         Judgment: Judgment normal.

## 2023-08-30 ENCOUNTER — OFFICE VISIT (OUTPATIENT)
Dept: PHYSICAL THERAPY | Facility: MEDICAL CENTER | Age: 48
End: 2023-08-30
Payer: COMMERCIAL

## 2023-08-30 DIAGNOSIS — M54.16 LUMBAR RADICULITIS: Primary | ICD-10-CM

## 2023-08-30 PROCEDURE — 97110 THERAPEUTIC EXERCISES: CPT | Performed by: PHYSICAL THERAPIST

## 2023-08-30 NOTE — PROGRESS NOTES
Daily Note     Today's date: 2023  Patient name: Taylor Hunt  : 1975  MRN: [id number]  Referring provider: Joey Mae DO  Dx:   Encounter Diagnosis     ICD-10-CM    1. Lumbar radiculitis  M54.16                      Subjective: Laura Winkler reports that he continues to have radiating pain down posterior left leg       Objective: See treatment diary below      Assessment: Tolerated treatment well. Patient with only temporary centralization of symptoms, continues with higher irritability. Hold until after consult with Dr Rosibel Curran, possible DAYAN     Plan: Continue per plan of care.       Precautions: None      Manuals                                                                 Neuro Re-Ed                                                                                                        Ther Ex             R sidelying opening  10 X 10            Supine LTR 30            Standing wall extensions 30                                                                             Ther Activity                                       Gait Training                                       Modalities             Traction Prone  90#

## 2023-08-31 ENCOUNTER — OFFICE VISIT (OUTPATIENT)
Dept: PAIN MEDICINE | Facility: CLINIC | Age: 48
End: 2023-08-31
Payer: COMMERCIAL

## 2023-08-31 ENCOUNTER — TELEPHONE (OUTPATIENT)
Dept: FAMILY MEDICINE CLINIC | Facility: CLINIC | Age: 48
End: 2023-08-31

## 2023-08-31 VITALS
HEART RATE: 97 BPM | BODY MASS INDEX: 27.34 KG/M2 | SYSTOLIC BLOOD PRESSURE: 126 MMHG | DIASTOLIC BLOOD PRESSURE: 82 MMHG | WEIGHT: 196 LBS

## 2023-08-31 DIAGNOSIS — M51.16 LUMBAR DISC DISEASE WITH RADICULOPATHY: ICD-10-CM

## 2023-08-31 DIAGNOSIS — M51.26 LUMBAR DISC HERNIATION: Primary | ICD-10-CM

## 2023-08-31 PROCEDURE — 99204 OFFICE O/P NEW MOD 45 MIN: CPT | Performed by: PHYSICAL MEDICINE & REHABILITATION

## 2023-08-31 PROCEDURE — 99244 OFF/OP CNSLTJ NEW/EST MOD 40: CPT | Performed by: PHYSICAL MEDICINE & REHABILITATION

## 2023-08-31 NOTE — PROGRESS NOTES
Assessment  1. Lumbar disc herniation    2. Lumbar disc disease with radiculopathy        Plan  Mr. Young Dias is a pleasant 76-year-old male significant past medical history of type 2 diabetes, Raynaud's, hypertension [facility] for initial evaluation regarding several months duration of low back pain with radiating symptoms into the left lower extremity. During today's evaluation he is demonstrating clinical and diagnostic evidence of lumbar radiculopathy likely in relation to the significant MRI findings of central disc extrusion type herniation at L5-S1 with questionable small disc fragment impacting the exiting nerve also causing mild central and left-sided subarticular recess narrowing likely contributing to his radicular pain into his left leg. At this time interventional approaches will be beneficial and warranted as he is already in physical therapy and has already started gabapentin. As such we will  1. Plan for a lumbar dorsal injection left paramedian approach under fluoroscopy guidance  2. Complete risks and benefits including bleeding, infection, tissue reaction, nerve injury and allergic reaction were discussed. The approach was demonstrated using models and literature was provided. Verbal and written consent was obtained. My impressions and treatment recommendations were discussed in detail with the patient who verbalized understanding and had no further questions. Discharge instructions were provided. I personally saw and examined the patient and I agree with the above discussed plan of care. Orders Placed This Encounter   Procedures   • FL spine and pain procedure     Standing Status:   Future     Standing Expiration Date:   8/31/2027     Order Specific Question:   Reason for Exam:     Answer:   LESI (L5-S1)     Order Specific Question:   Anticoagulant hold needed? Answer:   No     No orders of the defined types were placed in this encounter.       History of Present Illness    Obie Solitario is a 51 y.o. male presents to [facility] for initial evaluation regarding 2 months duration of low back pain with radiating symptoms into the left hip and buttock. Denies any significant inciting event or recent trauma and has been referred by Dr. Krishan Chao for disc herniation. Today reports severe pain rated 9 out of 10 and interfere with activities. Pain is nearly constant 60 to 95% of the time that is present throughout the day and night. Describes symptoms of burning, cramping, shooting, numbness, sharp, dull aching, throbbing, pressure-like pain. Denies any significant lower extremity weakness or falls. Does not use any durable medical equipment for ambulation. Symptoms are worse with bending, sitting, exercise, coughing, sneezing. Reports moderate relief with traction, physical therapy, heat, chiropractic manipulation. Denies smoking, marijuana or alcohol use. Currently using Tylenol and Motrin with no relief as well as gabapentin. Presents today for initial evaluation. I have personally reviewed and/or updated the patient's past medical history, past surgical history, family history, social history, current medications, allergies, and vital signs today. Review of Systems   Constitutional: Negative for fever and unexpected weight change. HENT: Negative for trouble swallowing. Eyes: Negative for visual disturbance. Respiratory: Negative for shortness of breath and wheezing. Cardiovascular: Negative for chest pain and palpitations. Gastrointestinal: Negative for constipation, diarrhea, nausea and vomiting. Endocrine: Negative for cold intolerance, heat intolerance and polydipsia. Genitourinary: Negative for difficulty urinating and frequency. Musculoskeletal: Positive for back pain, gait problem and joint swelling. Negative for arthralgias and myalgias. Skin: Negative for rash. Neurological: Positive for numbness. Negative for dizziness, seizures, syncope, weakness and headaches. Hematological: Does not bruise/bleed easily. Psychiatric/Behavioral: Negative for dysphoric mood. All other systems reviewed and are negative. Patient Active Problem List   Diagnosis   • Erectile dysfunction of non-organic origin   • Esophageal reflux   • Generalized anxiety disorder   • Hemorrhoid   • Hypercholesterolemia   • Hypertension   • Vitamin D deficiency   • Chronic pain of left knee   • Insect sting   • Influenza   • Acute non-recurrent maxillary sinusitis   • Lumbar radiculitis   • Lipoma of breast   • Elevated blood pressure reading   • Subcutaneous nodule   • Poison ivy   • Bee sting   • Cellulitis of right lower extremity   • Raynaud's disease without gangrene   • Type 2 diabetes mellitus without complication, without long-term current use of insulin (HCC)   • Lumbar strain, initial encounter   • Slow transit constipation   • Dermatitis   • Subcutaneous nodule of lower extremity       Past Medical History:   Diagnosis Date   • Anxiety    • Chronic pain disorder     knees   • Erectile dysfunction    • Hyperlipidemia     denies   • Hypertension    • Impaired fasting blood sugar    • Lumbar radiculitis    • Right shoulder pain     resolved. .. Lilia Ellsworth 1/18/18   • Vitamin D deficiency        Past Surgical History:   Procedure Laterality Date   • ABDOMINAL MASS RESECTION      excision of intra       resolved. ... 2011   • INGUINAL HERNIA REPAIR      resolved. ...2004   • LIPOMA RESECTION     • AZ EXC CYST/ABERRANT BREAST TISSUE OPEN 1/> LESION Left 4/19/2019    Procedure: EXCISION BREAST MASS;  Surgeon: Kam John DO;  Location: [address] [place] [address] OR;  Service: General   • AZ EXCISION TUMOR SOFT TISSUE BACK/FLANK SUBQ <3CM Left 4/19/2019    Procedure: EXCISION FLANK MASS;  Surgeon: Kam John DO;  Location: [address] [place] [address] OR;  Service: General       Family History   Problem Relation Age of Onset   • Hypertension Mother    • Diabetes type II Mother    • Hypertension Father        Social History     Occupational History   • Occupation: [employer]     Tobacco Use   • Smoking status: Never   • Smokeless tobacco: Never   Vaping Use   • Vaping Use: Never used   Substance and Sexual Activity   • Alcohol use: Not Currently     Comment: social    • Drug use: No   • Sexual activity: Yes     Partners: Female       Current Outpatient Medications on File Prior to Visit   Medication Sig   • clonazePAM (KlonoPIN) 2 mg tablet Take 1 tablet (2 mg total) by mouth 3 (three) times a day   • gabapentin (Neurontin) 300 mg capsule Take 1 capsule (300 mg total) by mouth 3 (three) times a day   • lisinopril (ZESTRIL) 5 mg tablet TAKE 1 TABLET BY MOUTH EVERY DAY   • meloxicam (MOBIC) 15 mg tablet Take 15 mg by mouth daily   • methocarbamol (ROBAXIN) 500 mg tablet Take 1 tablet (500 mg total) by mouth 4 (four) times a day as needed for muscle spasms   • mometasone (ELOCON) 0.1 % cream Apply topically daily   • Multiple Vitamins-Minerals (VISION-JONAS PRESERVE PO) Take by mouth   • predniSONE 10 mg tablet 5 pills daily for 3 days, 4 for 3 days, 3 for 3 days, 2 for 3 days, 1 for 3 days. • rosuvastatin (CRESTOR) 5 mg tablet Take 1 tablet (5 mg total) by mouth daily   • Vardenafil HCl 10 MG TBDP 1 tablet daily as needed. No current facility-administered medications on file prior to visit. Allergies   Allergen Reactions   • Amoxicillin Hives   • Penicillins Hives       Physical Exam    /82   Pulse 97   Wt 88.9 kg (196 lb)   BMI 27.34 kg/m²     Constitutional: normal, well developed, well nourished, alert, in no distress and non-toxic and no overt pain behavior.   Eyes: anicteric  HEENT: grossly intact  Neck: supple, symmetric, trachea midline and no masses   Pulmonary:even and unlabored  Cardiovascular:No edema or pitting edema present  Skin:Normal without rashes or lesions and well hydrated  Psychiatric:Mood and affect appropriate  Neurologic:Cranial Nerves II-XII grossly intact  Musculoskeletal:normal gait, tenderness to palpation left-sided lumbar paraspinals, decreased active and passive range of motion with lumbar flexion and extension limited by pain, MMT 5 out of 5 bilateral lower extremities, sensation decreased to light touch in patchy distribution posterior left thigh, positive straight leg raise in the supine position with radicular pain into the left leg, DTRs within normal limits    Imaging    MRI LUMBAR SPINE WITHOUT CONTRAST     INDICATION: M54.16: Radiculopathy, lumbar region.     COMPARISON: 8/8/2023     TECHNIQUE:  Multiplanar, multisequence imaging of the lumbar spine was performed. .        IMAGE QUALITY:  Diagnostic     FINDINGS:     VERTEBRAL BODIES:  There are 5 lumbar type vertebral bodies. Trace grade 1 anterolisthesis of L4 on L5. Minimal levoscoliosis mid lumbar spine. Scattered hemangiomata noted. Scattered degenerative endplate changes. No focally suspicious marrow lesions. No bone marrow edema or compression abnormality.     SACRUM:  Normal signal within the sacrum. No evidence of insufficiency or stress fracture.     DISTAL CORD AND CONUS:  Normal size and signal within the distal cord and conus. The conus medullaris terminates at the superior endplate of L1.     PARASPINAL SOFT TISSUES:  Paraspinal soft tissues are unremarkable.     LOWER THORACIC DISC SPACES:  Normal disc height and signal.  No disc herniation, canal stenosis or foraminal narrowing.     LUMBAR DISC SPACES:     L1-L2:  Normal.     L2-L3:  Normal.     L3-L4: There is loss of disc height and signal. There is a left neural foraminal disc protrusion. Mild left neural foraminal narrowing. Central canal and right neural foramen patent.     L4-L5: There is uncovering the intervertebral disc. There is a right neural foraminal disc protrusion. Moderate right neural foraminal narrowing. Central canal patent.  Mild left neural foraminal narrowing.        L5-S1: There is a central disc herniation, extrusion type. Mild central canal narrowing. There may be a small disc fragment associated with the left side of the disc herniation, series 3 image 7. Mild central canal narrowing. Mild narrowing of left   subarticular recess. Neural foramina patent bilaterally.     OTHER FINDINGS:  None.     IMPRESSION:        1.  Trace grade 1 anterolisthesis of L4 and L5.  2. Multilevel spondylosis with disc extrusion at L5-S1 and possible associated disc fragment associated with the left side of the disc herniation

## 2023-08-31 NOTE — LETTER
August 31, 2023     Huma Louis, [address] [place] [address] [address] [place] [address]    Patient: Debi Karimi   YOB: 1975   Date of Visit: 8/31/2023       Dear Dr. Gene Lynn:    Thank you for referring Jazmine Gottlieb to me for evaluation. Below are my notes for this consultation. If you have questions, please do not hesitate to call me. I look forward to following your patient along with you. Sincerely,        Dandy Hansen DO        CC: No Recipients    Dandy Hansen DO  8/31/2023  8:17 AM  Signed  Assessment  1. Lumbar disc herniation    2. Lumbar disc disease with radiculopathy        Plan  Mr. Gabe Miller is a pleasant 48-year-old male significant past medical history of type 2 diabetes, Raynaud's, hypertension presents to [address] [place] [address] [facility] for initial evaluation regarding several months duration of low back pain with radiating symptoms into the left lower extremity. During today's evaluation he is demonstrating clinical and diagnostic evidence of lumbar radiculopathy likely in relation to the significant MRI findings of central disc extrusion type herniation at L5-S1 with questionable small disc fragment impacting the exiting nerve also causing mild central and left-sided subarticular recess narrowing likely contributing to his radicular pain into his left leg. At this time interventional approaches will be beneficial and warranted as he is already in physical therapy and has already started gabapentin. As such we will  1. Plan for a lumbar dorsal injection left paramedian approach under fluoroscopy guidance  2. Complete risks and benefits including bleeding, infection, tissue reaction, nerve injury and allergic reaction were discussed. The approach was demonstrated using models and literature was provided. Verbal and written consent was obtained.     My impressions and treatment recommendations were discussed in detail with the patient who verbalized understanding and had no further questions. Discharge instructions were provided. I personally saw and examined the patient and I agree with the above discussed plan of care. Orders Placed This Encounter   Procedures   • FL spine and pain procedure     Standing Status:   Future     Standing Expiration Date:   8/31/2027     Order Specific Question:   Reason for Exam:     Answer:   LESI (L5-S1)     Order Specific Question:   Anticoagulant hold needed? Answer:   No     No orders of the defined types were placed in this encounter. History of Present Illness    Verenice Coronado is a 51 y.o. male presents to [address] [place] [address] [facility] for initial evaluation regarding 2 months duration of low back pain with radiating symptoms into the left hip and buttock. Denies any significant inciting event or recent trauma and has been referred by Dr. Katheryn Reed for disc herniation. Today reports severe pain rated 9 out of 10 and interfere with activities. Pain is nearly constant 60 to 95% of the time that is present throughout the day and night. Describes symptoms of burning, cramping, shooting, numbness, sharp, dull aching, throbbing, pressure-like pain. Denies any significant lower extremity weakness or falls. Does not use any durable medical equipment for ambulation. Symptoms are worse with bending, sitting, exercise, coughing, sneezing. Reports moderate relief with traction, physical therapy, heat, chiropractic manipulation. Denies smoking, marijuana or alcohol use. Currently using Tylenol and Motrin with no relief as well as gabapentin. Presents today for initial evaluation. I have personally reviewed and/or updated the patient's past medical history, past surgical history, family history, social history, current medications, allergies, and vital signs today. Review of Systems   Constitutional: Negative for fever and unexpected weight change. HENT: Negative for trouble swallowing. Eyes: Negative for visual disturbance. Respiratory: Negative for shortness of breath and wheezing. Cardiovascular: Negative for chest pain and palpitations. Gastrointestinal: Negative for constipation, diarrhea, nausea and vomiting. Endocrine: Negative for cold intolerance, heat intolerance and polydipsia. Genitourinary: Negative for difficulty urinating and frequency. Musculoskeletal: Positive for back pain, gait problem and joint swelling. Negative for arthralgias and myalgias. Skin: Negative for rash. Neurological: Positive for numbness. Negative for dizziness, seizures, syncope, weakness and headaches. Hematological: Does not bruise/bleed easily. Psychiatric/Behavioral: Negative for dysphoric mood. All other systems reviewed and are negative. Patient Active Problem List   Diagnosis   • Erectile dysfunction of non-organic origin   • Esophageal reflux   • Generalized anxiety disorder   • Hemorrhoid   • Hypercholesterolemia   • Hypertension   • Vitamin D deficiency   • Chronic pain of left knee   • Insect sting   • Influenza   • Acute non-recurrent maxillary sinusitis   • Lumbar radiculitis   • Lipoma of breast   • Elevated blood pressure reading   • Subcutaneous nodule   • Poison ivy   • Bee sting   • Cellulitis of right lower extremity   • Raynaud's disease without gangrene   • Type 2 diabetes mellitus without complication, without long-term current use of insulin ([facility])   • Lumbar strain, initial encounter   • Slow transit constipation   • Dermatitis   • Subcutaneous nodule of lower extremity       Past Medical History:   Diagnosis Date   • Anxiety    • Chronic pain disorder     knees   • Erectile dysfunction    • Hyperlipidemia     denies   • Hypertension    • Impaired fasting blood sugar    • Lumbar radiculitis    • Right shoulder pain     resolved. .. Bowen Speaker Bowen Speaker 1/18/18   • Vitamin D deficiency        Past Surgical History:   Procedure Laterality Date   • ABDOMINAL MASS RESECTION      excision of intra       resolved. ... 2011   • INGUINAL HERNIA REPAIR      resolved. ...2004   • LIPOMA RESECTION     • AL EXC CYST/ABERRANT BREAST TISSUE OPEN 1/> LESION Left 4/19/2019    Procedure: EXCISION BREAST MASS;  Surgeon: Kam John DO;  Location: [address] [place] [address] OR;  Service: General   • AL EXCISION TUMOR SOFT TISSUE BACK/FLANK SUBQ <3CM Left 4/19/2019    Procedure: EXCISION FLANK MASS;  Surgeon: Kam John DO;  Location: [address] [place] [address] OR;  Service: General       Family History   Problem Relation Age of Onset   • Hypertension Mother    • Diabetes type II Mother    • Hypertension Father        Social History     Occupational History   • Occupation: construction     Tobacco Use   • Smoking status: Never   • Smokeless tobacco: Never   Vaping Use   • Vaping Use: Never used   Substance and Sexual Activity   • Alcohol use: Not Currently     Comment: social    • Drug use: No   • Sexual activity: Yes     Partners: Female       Current Outpatient Medications on File Prior to Visit   Medication Sig   • clonazePAM (KlonoPIN) 2 mg tablet Take 1 tablet (2 mg total) by mouth 3 (three) times a day   • gabapentin (Neurontin) 300 mg capsule Take 1 capsule (300 mg total) by mouth 3 (three) times a day   • lisinopril (ZESTRIL) 5 mg tablet TAKE 1 TABLET BY MOUTH EVERY DAY   • meloxicam (MOBIC) 15 mg tablet Take 15 mg by mouth daily   • methocarbamol (ROBAXIN) 500 mg tablet Take 1 tablet (500 mg total) by mouth 4 (four) times a day as needed for muscle spasms   • mometasone (ELOCON) 0.1 % cream Apply topically daily   • Multiple Vitamins-Minerals (VISION-JONAS PRESERVE PO) Take by mouth   • predniSONE 10 mg tablet 5 pills daily for 3 days, 4 for 3 days, 3 for 3 days, 2 for 3 days, 1 for 3 days. • rosuvastatin (CRESTOR) 5 mg tablet Take 1 tablet (5 mg total) by mouth daily   • Vardenafil HCl 10 MG TBDP 1 tablet daily as needed. No current facility-administered medications on file prior to visit.        Allergies   Allergen Reactions   • Amoxicillin Hives   • Penicillins Hives Physical Exam    /82   Pulse 97   Wt 88.9 kg (196 lb)   BMI 27.34 kg/m²     Constitutional: normal, well developed, well nourished, alert, in no distress and non-toxic and no overt pain behavior. Eyes: anicteric  HEENT: grossly intact  Neck: supple, symmetric, trachea midline and no masses   Pulmonary:even and unlabored  Cardiovascular:No edema or pitting edema present  Skin:Normal without rashes or lesions and well hydrated  Psychiatric:Mood and affect appropriate  Neurologic:Cranial Nerves II-XII grossly intact  Musculoskeletal:normal gait, tenderness to palpation left-sided lumbar paraspinals, decreased active and passive range of motion with lumbar flexion and extension limited by pain, MMT 5 out of 5 bilateral lower extremities, sensation decreased to light touch in patchy distribution posterior left thigh, positive straight leg raise in the supine position with radicular pain into the left leg, DTRs within normal limits    Imaging    MRI LUMBAR SPINE WITHOUT CONTRAST     INDICATION: M54.16: Radiculopathy, lumbar region. COMPARISON: 8/8/2023     TECHNIQUE:  Multiplanar, multisequence imaging of the lumbar spine was performed. .        IMAGE QUALITY:  Diagnostic     FINDINGS:     VERTEBRAL BODIES:  There are 5 lumbar type vertebral bodies. Trace grade 1 anterolisthesis of L4 on L5. Minimal levoscoliosis mid lumbar spine. Scattered hemangiomata noted. Scattered degenerative endplate changes. No focally suspicious marrow lesions. No bone marrow edema or compression abnormality. SACRUM:  Normal signal within the sacrum. No evidence of insufficiency or stress fracture. DISTAL CORD AND CONUS:  Normal size and signal within the distal cord and conus. The conus medullaris terminates at the superior endplate of L1. PARASPINAL SOFT TISSUES:  Paraspinal soft tissues are unremarkable.      LOWER THORACIC DISC SPACES:  Normal disc height and signal.  No disc herniation, canal stenosis or foraminal narrowing. LUMBAR DISC SPACES:     L1-L2:  Normal.     L2-L3:  Normal.     L3-L4: There is loss of disc height and signal. There is a left neural foraminal disc protrusion. Mild left neural foraminal narrowing. Central canal and right neural foramen patent. L4-L5: There is uncovering the intervertebral disc. There is a right neural foraminal disc protrusion. Moderate right neural foraminal narrowing. Central canal patent. Mild left neural foraminal narrowing. L5-S1: There is a central disc herniation, extrusion type. Mild central canal narrowing. There may be a small disc fragment associated with the left side of the disc herniation, series 3 image 7. Mild central canal narrowing. Mild narrowing of left   subarticular recess. Neural foramina patent bilaterally. OTHER FINDINGS:  None. IMPRESSION:        1.  Trace grade 1 anterolisthesis of L4 and L5.  2. Multilevel spondylosis with disc extrusion at L5-S1 and possible associated disc fragment associated with the left side of the disc herniation

## 2023-09-05 ENCOUNTER — DOCUMENTATION (OUTPATIENT)
Dept: FAMILY MEDICINE CLINIC | Facility: CLINIC | Age: 48
End: 2023-09-05

## 2023-09-05 ENCOUNTER — OFFICE VISIT (OUTPATIENT)
Dept: FAMILY MEDICINE CLINIC | Facility: CLINIC | Age: 48
End: 2023-09-05
Payer: COMMERCIAL

## 2023-09-05 VITALS
BODY MASS INDEX: 27.44 KG/M2 | HEIGHT: 71 IN | HEART RATE: 118 BPM | OXYGEN SATURATION: 98 % | WEIGHT: 196 LBS | SYSTOLIC BLOOD PRESSURE: 112 MMHG | TEMPERATURE: 98.6 F | DIASTOLIC BLOOD PRESSURE: 70 MMHG

## 2023-09-05 DIAGNOSIS — M54.16 LUMBAR RADICULOPATHY: ICD-10-CM

## 2023-09-05 DIAGNOSIS — S39.012A LUMBAR STRAIN, INITIAL ENCOUNTER: ICD-10-CM

## 2023-09-05 DIAGNOSIS — M51.26 LUMBAR HERNIATED DISC: Primary | ICD-10-CM

## 2023-09-05 PROCEDURE — 99213 OFFICE O/P EST LOW 20 MIN: CPT | Performed by: FAMILY MEDICINE

## 2023-09-05 RX ORDER — GABAPENTIN 300 MG/1
600 CAPSULE ORAL 3 TIMES DAILY
Qty: 90 CAPSULE | Refills: 2
Start: 2023-09-05

## 2023-09-05 NOTE — PROGRESS NOTES
Assessment/Plan: Note for patient to remain out of work for 1 month. Patient will set up appointment in 2 weeks. Patient may increase gabapentin as directed. Patient may continue with meloxicam.  Patient will follow-up for injection with pain management next week. Follow-up in 2 weeks       Diagnoses and all orders for this visit:    Lumbar herniated disc    Lumbar radiculopathy            Subjective:        Patient ID: Paula Lawton is a 51 y.o. male. Patient is here to follow-up on lumbar radiculopathy/disc herniation L5-S1. Patient with ongoing numbness and pain into the left leg as noted in previous exams. Patient did see pain management. No saddle anesthesia. No other change in urination or defecation. Patient will go for injection next Monday. The following portions of the patient's history were reviewed and updated as appropriate: allergies, current medications, past family history, past medical history, past social history, past surgical history and problem list.      Review of Systems   Constitutional: Negative. HENT: Negative. Eyes: Negative. Respiratory: Negative. Cardiovascular: Negative. Gastrointestinal: Negative. Endocrine: Negative. Genitourinary: Negative. Musculoskeletal: Positive for arthralgias, back pain and gait problem. Skin: Negative. Allergic/Immunologic: Negative. Neurological: Positive for numbness. Hematological: Negative. Psychiatric/Behavioral: Negative. Objective:               /70 (BP Location: Right arm, Patient Position: Standing, Cuff Size: Large)   Pulse (!) 118   Temp 98.6 °F (37 °C) (Temporal)   Ht 5' 11" (1.803 m)   Wt 88.9 kg (196 lb)   SpO2 98%   BMI 27.34 kg/m²          Physical Exam  Nursing note reviewed. Constitutional:       Comments: Patient in pain   HENT:      Head: Normocephalic and atraumatic. Musculoskeletal:         General: Tenderness present. Right lower leg: No edema. Left lower leg: No edema. Comments: Positive straight leg raise on the left. Pain with palpation lumbar region L5-S1 region and also into the left buttocks    Skin:     Capillary Refill: Capillary refill takes less than 2 seconds. Neurological:      Mental Status: He is alert. Psychiatric:         Mood and Affect: Mood normal.         Behavior: Behavior normal.         Thought Content:  Thought content normal.

## 2023-09-11 ENCOUNTER — TELEPHONE (OUTPATIENT)
Dept: PAIN MEDICINE | Facility: CLINIC | Age: 48
End: 2023-09-11

## 2023-09-11 ENCOUNTER — HOSPITAL ENCOUNTER (OUTPATIENT)
Dept: RADIOLOGY | Facility: CLINIC | Age: 48
Discharge: HOME | End: 2023-09-11
Admitting: PHYSICAL MEDICINE & REHABILITATION
Payer: COMMERCIAL

## 2023-09-11 VITALS
DIASTOLIC BLOOD PRESSURE: 79 MMHG | HEART RATE: 98 BPM | OXYGEN SATURATION: 98 % | TEMPERATURE: 98.4 F | SYSTOLIC BLOOD PRESSURE: 121 MMHG | RESPIRATION RATE: 18 BRPM

## 2023-09-11 DIAGNOSIS — M51.26 LUMBAR DISC HERNIATION: ICD-10-CM

## 2023-09-11 DIAGNOSIS — M51.16 LUMBAR DISC DISEASE WITH RADICULOPATHY: ICD-10-CM

## 2023-09-11 PROCEDURE — 62323 NJX INTERLAMINAR LMBR/SAC: CPT | Performed by: PHYSICAL MEDICINE & REHABILITATION

## 2023-09-11 RX ORDER — METHYLPREDNISOLONE ACETATE 80 MG/ML
80 INJECTION, SUSPENSION INTRA-ARTICULAR; INTRALESIONAL; INTRAMUSCULAR; PARENTERAL; SOFT TISSUE ONCE
Status: COMPLETED | OUTPATIENT
Start: 2023-09-11 | End: 2023-09-11

## 2023-09-11 RX ADMIN — METHYLPREDNISOLONE ACETATE 80 MG: 80 INJECTION, SUSPENSION INTRA-ARTICULAR; INTRALESIONAL; INTRAMUSCULAR; PARENTERAL; SOFT TISSUE at 11:13

## 2023-09-11 RX ADMIN — IOHEXOL 1 ML: 300 INJECTION, SOLUTION INTRAVENOUS at 11:13

## 2023-09-11 NOTE — H&P
History of Present Illness: The patient is a 51 y.o. male who presents with complaints of low back pain    Past Medical History:   Diagnosis Date   • Anxiety    • Chronic pain disorder     knees   • Erectile dysfunction    • Hyperlipidemia     denies   • Hypertension    • Impaired fasting blood sugar    • Lumbar radiculitis    • Right shoulder pain     resolved. .. Aramis Wilcox 1/18/18   • Vitamin D deficiency        Past Surgical History:   Procedure Laterality Date   • ABDOMINAL MASS RESECTION      excision of intra       resolved. ... 2011   • INGUINAL HERNIA REPAIR      resolved. ...2004   • LIPOMA RESECTION     • WI EXC CYST/ABERRANT BREAST TISSUE OPEN 1/> LESION Left 4/19/2019    Procedure: EXCISION BREAST MASS;  Surgeon: Robb Greenfield DO;  Location: [facility] MAIN OR;  Service: General   • WI EXCISION TUMOR SOFT TISSUE BACK/FLANK SUBQ <3CM Left 4/19/2019    Procedure: EXCISION FLANK MASS;  Surgeon: Robb Greenfield DO;  Location:  MAIN OR;  Service: General         Current Outpatient Medications:   •  clonazePAM (KlonoPIN) 2 mg tablet, Take 1 tablet (2 mg total) by mouth 3 (three) times a day, Disp: 90 tablet, Rfl: 2  •  gabapentin (Neurontin) 300 mg capsule, Take 2 capsules (600 mg total) by mouth 3 (three) times a day, Disp: 90 capsule, Rfl: 2  •  lisinopril (ZESTRIL) 5 mg tablet, TAKE 1 TABLET BY MOUTH EVERY DAY, Disp: 90 tablet, Rfl: 3  •  meloxicam (MOBIC) 15 mg tablet, Take 15 mg by mouth daily, Disp: , Rfl:   •  mometasone (ELOCON) 0.1 % cream, Apply topically daily, Disp: 45 g, Rfl: 2  •  Multiple Vitamins-Minerals (VISION-JONAS PRESERVE PO), Take by mouth, Disp: , Rfl:   •  rosuvastatin (CRESTOR) 5 mg tablet, Take 1 tablet (5 mg total) by mouth daily, Disp: 90 tablet, Rfl: 1  •  Vardenafil HCl 10 MG TBDP, 1 tablet daily as needed. , Disp: 4 tablet, Rfl: 11    Current Facility-Administered Medications:   •  iohexol (OMNIPAQUE) 300 mg/mL injection 1 mL, 1 mL, Epidural, Once, Jeremiah Burr DO  •  methylPREDNISolone acetate (DEPO-MEDROL) injection 80 mg, 80 mg, Epidural, Once, Natalia Toney,     Allergies   Allergen Reactions   • Amoxicillin Hives   • Penicillins Hives       Physical Exam:   Vitals:    09/11/23 1056   BP: 129/81   Pulse: (!) 110   Resp: 20   Temp: 98.4 °F (36.9 °C)   SpO2: 98%     General: Awake, Alert, Oriented x 3, Mood and affect appropriate  Respiratory: Respirations even and unlabored  Cardiovascular: Peripheral pulses intact; no edema  Musculoskeletal Exam: Tenderness palpation bilateral lumbar paraspinals    ASA Score: 2    Patient/Chart Verification  Patient ID Verified: Verbal  Consents Confirmed: To be obtained in the Pre-Procedure area  Interval H&P(within 24 hr) Complete (required for Outpatients and Surgery Admit only): To be obtained in the Pre-Procedure area  Allergies Reviewed: Yes  Anticoag/NSAID held?: NA  Currently on antibiotics?: No    Assessment:   1. Lumbar disc disease with radiculopathy    2.  Lumbar disc herniation        Plan: LESI (L5-S1)

## 2023-09-11 NOTE — DISCHARGE INSTR - LAB
Epidural Steroid Injection   WHAT YOU NEED TO KNOW:   An epidural steroid injection (DAYAN) is a procedure to inject steroid medicine into the epidural space. The epidural space is between your spinal cord and vertebrae. Steroids reduce inflammation and fluid buildup in your spine that may be causing pain. You may be given pain medicine along with the steroids. ACTIVITY  Do not drive or operate machinery today. No strenuous activity today - bending, lifting, etc.  You may resume normal activites starting tomorrow - start slowly and as tolerated. You may shower today, but no tub baths or hot tubs. You may have numbness for several hours from the local anesthetic. Please use caution and common sense, especially with weight-bearing activities. CARE OF THE INJECTION SITE  If you have soreness or pain, apply ice to the area today (20 minutes on/20 minutes off). Starting tomorrow, you may use warm, moist heat or ice if needed. You may have an increase or change in your discomfort for 36-48 hours after your treatment. Apply ice and continue with any pain medication you have been prescribed. Notify the [facility] if you have any of the following: redness, drainage, swelling, headache, stiff neck or fever above 100°F.    SPECIAL INSTRUCTIONS  Our office will contact you in approximately 7 days for a progress report. MEDICATIONS  Continue to take all routine medications. Our office may have instructed you to hold some medications. As no general anesthesia was used in today's procedure, you should not experience any side effects related to anesthesia. If you are diabetic, the steroids used in today's injection may temporarily increase your blood sugar levels after the first few days after your injection. Please keep a close eye on your sugars and alert the doctor who manages your diabetes if your sugars are significantly high from your baseline or you are symptomatic.      If you have a problem specifically related to your procedure, please call our office at [phone]. Problems not related to your procedure should be directed to your primary care physician.

## 2023-09-15 DIAGNOSIS — M51.16 LUMBAR DISC DISEASE WITH RADICULOPATHY: Primary | ICD-10-CM

## 2023-09-15 RX ORDER — MELOXICAM 15 MG/1
15 TABLET ORAL DAILY
Qty: 30 TABLET | Refills: 1 | Status: SHIPPED | OUTPATIENT
Start: 2023-09-15

## 2023-09-18 ENCOUNTER — TELEPHONE (OUTPATIENT)
Dept: PAIN MEDICINE | Facility: CLINIC | Age: 48
End: 2023-09-18

## 2023-09-18 ENCOUNTER — OFFICE VISIT (OUTPATIENT)
Dept: FAMILY MEDICINE CLINIC | Facility: CLINIC | Age: 48
End: 2023-09-18
Payer: COMMERCIAL

## 2023-09-18 VITALS
TEMPERATURE: 97.8 F | OXYGEN SATURATION: 98 % | SYSTOLIC BLOOD PRESSURE: 120 MMHG | BODY MASS INDEX: 27.94 KG/M2 | HEART RATE: 78 BPM | WEIGHT: 199.6 LBS | HEIGHT: 71 IN | DIASTOLIC BLOOD PRESSURE: 78 MMHG

## 2023-09-18 DIAGNOSIS — M51.26 LUMBAR DISC HERNIATION: ICD-10-CM

## 2023-09-18 DIAGNOSIS — S39.012A LUMBAR STRAIN, INITIAL ENCOUNTER: ICD-10-CM

## 2023-09-18 DIAGNOSIS — E11.9 TYPE 2 DIABETES MELLITUS WITHOUT COMPLICATION, WITHOUT LONG-TERM CURRENT USE OF INSULIN (HCC): Primary | ICD-10-CM

## 2023-09-18 DIAGNOSIS — Z00.00 WELL ADULT EXAM: ICD-10-CM

## 2023-09-18 PROCEDURE — 99214 OFFICE O/P EST MOD 30 MIN: CPT | Performed by: FAMILY MEDICINE

## 2023-09-18 PROCEDURE — 82570 ASSAY OF URINE CREATININE: CPT | Performed by: FAMILY MEDICINE

## 2023-09-18 PROCEDURE — 82043 UR ALBUMIN QUANTITATIVE: CPT | Performed by: FAMILY MEDICINE

## 2023-09-18 PROCEDURE — 99396 PREV VISIT EST AGE 40-64: CPT | Performed by: FAMILY MEDICINE

## 2023-09-18 NOTE — TELEPHONE ENCOUNTER
Pt reports 80% improvement post inj  Pain level 2-3/10  Pt aware I will call next week for an update

## 2023-09-18 NOTE — PROGRESS NOTES
Assessment/Plan: Patient will follow with physical therapy as well as pain management for ongoing lumbar radiculopathy. Patient is seeing some improvement with epidural that he received. Wellness exam done at this time. Vaccines up-to-date. Labs up-to-date. No history of colon or prostate cancer. Patient will do Cologuard for colorectal screening. Patient will follow-up in 2 weeks       Diagnoses and all orders for this visit:    Type 2 diabetes mellitus without complication, without long-term current use of insulin (HCC)  -     Albumin / creatinine urine ratio; Future  -     Albumin / creatinine urine ratio    Lumbar disc herniation    Lumbar strain, initial encounter    Well adult exam            Subjective:        Patient ID: Obie Solitario is a 51 y.o. male. Patient is here to follow-up on lumbar radiculopathy. Patient's radicular symptoms have improved overall. Patient's pain is roughly 3-4 out of 10. Patient is working with pain management. Patient not working with PT at the present time. Patient's radicular symptoms in the left leg have improved but still persist to some degree. Patient is down on gabapentin 1 tablet 3 times daily. No new difficulty with urination or defecation. Patient is here for wellness exam.  Labs and vaccines reviewed. No early family history of colon or prostate cancer. The following portions of the patient's history were reviewed and updated as appropriate: allergies, current medications, past family history, past medical history, past social history, past surgical history and problem list.      Review of Systems   Constitutional: Negative. HENT: Negative. Eyes: Negative. Respiratory: Negative. Cardiovascular: Negative. Gastrointestinal: Negative. Endocrine: Negative. Genitourinary: Negative. Musculoskeletal: Positive for arthralgias, back pain and gait problem. Skin: Negative. Allergic/Immunologic: Negative.     Hematological: Negative. Psychiatric/Behavioral: Negative. Objective:      BMI Counseling: Body mass index is 27.84 kg/m². The BMI is above normal. Nutrition recommendations include encouraging healthy choices of fruits and vegetables. Exercise recommendations include moderate physical activity 150 minutes/week. Rationale for BMI follow-up plan is due to patient being overweight or obese. Depression Screening and Follow-up Plan: Clincally patient does not have depression. No treatment is required. /78 (BP Location: Right arm, Patient Position: Sitting, Cuff Size: Large)   Pulse 78   Temp 97.8 °F (36.6 °C) (Temporal)   Ht 5' 11" (1.803 m)   Wt 90.5 kg (199 lb 9.6 oz)   SpO2 98%   BMI 27.84 kg/m²          Physical Exam  Vitals and nursing note reviewed. Constitutional:       General: He is not in acute distress. Appearance: Normal appearance. He is not ill-appearing, toxic-appearing or diaphoretic. HENT:      Head: Normocephalic and atraumatic. Right Ear: Tympanic membrane, ear canal and external ear normal. There is no impacted cerumen. Left Ear: Tympanic membrane, ear canal and external ear normal. There is no impacted cerumen. Nose: Nose normal. No congestion or rhinorrhea. Mouth/Throat:      Mouth: Mucous membranes are moist.      Pharynx: No oropharyngeal exudate or posterior oropharyngeal erythema. Eyes:      General: No scleral icterus. Right eye: No discharge. Left eye: No discharge. Extraocular Movements: Extraocular movements intact. Conjunctiva/sclera: Conjunctivae normal.      Pupils: Pupils are equal, round, and reactive to light. Neck:      Vascular: No carotid bruit. Cardiovascular:      Rate and Rhythm: Normal rate and regular rhythm. Pulses: Normal pulses. Heart sounds: Normal heart sounds. No murmur heard. No friction rub. No gallop.    Pulmonary:      Effort: Pulmonary effort is normal. No respiratory distress. Breath sounds: Normal breath sounds. No stridor. No wheezing, rhonchi or rales. Chest:      Chest wall: No tenderness. Musculoskeletal:         General: Tenderness present. No swelling, deformity or signs of injury. Cervical back: Normal range of motion and neck supple. No rigidity. No muscular tenderness. Right lower leg: No edema. Left lower leg: No edema. Lymphadenopathy:      Cervical: No cervical adenopathy. Skin:     General: Skin is warm and dry. Capillary Refill: Capillary refill takes less than 2 seconds. Coloration: Skin is not jaundiced. Findings: No bruising, erythema, lesion or rash. Neurological:      Mental Status: He is alert and oriented to person, place, and time. Mental status is at baseline. Cranial Nerves: No cranial nerve deficit. Sensory: No sensory deficit. Motor: No weakness. Coordination: Coordination normal.      Gait: Gait abnormal.   Psychiatric:         Mood and Affect: Mood normal.         Behavior: Behavior normal.         Thought Content:  Thought content normal.         Judgment: Judgment normal.

## 2023-09-19 ENCOUNTER — OFFICE VISIT (OUTPATIENT)
Dept: PHYSICAL THERAPY | Facility: MEDICAL CENTER | Age: 48
End: 2023-09-19
Payer: COMMERCIAL

## 2023-09-19 DIAGNOSIS — M54.16 LUMBAR RADICULITIS: Primary | ICD-10-CM

## 2023-09-19 LAB
CREAT UR-MCNC: 120.3 MG/DL
MICROALBUMIN UR-MCNC: 10.3 MG/L
MICROALBUMIN/CREAT 24H UR: 9 MG/G CREATININE (ref 0–30)

## 2023-09-19 PROCEDURE — 97110 THERAPEUTIC EXERCISES: CPT | Performed by: PHYSICAL THERAPIST

## 2023-09-19 NOTE — PROGRESS NOTES
Daily Note     Today's date: 2023  Patient name: Nisha Cedeno  : 1975  MRN: [id number]  Referring provider: Glen Mckeon DO  Dx:   Encounter Diagnosis     ICD-10-CM    1. Lumbar radiculitis  M54.16                      Subjective: Rosy Aviles reports that he is doing better, 80% improved from pre injection       Objective: See treatment diary below      Assessment: Tolerated treatment well. Patient with L glute and posterior leg pain likely from residual neural irritation. Resume therapy to regain function and return to work ability   Plan: Continue per plan of care.       Precautions: None      Manuals             Theragun to L glute  AF             Prone UPA  L5/S1 AF                                       Neuro Re-Ed                                                                                                        Ther Ex             LTR 30            Supine hip abduction 30  black            Supine hip adduction 30            Seated nerve glide 20            Standing alternating shoulder extensions Blue  30            Anti rotation press Blue  30            Box lifts  No weight   15                         Ther Activity                                       Gait Training                                       Modalities             Traction Prone  90#

## 2023-09-20 ENCOUNTER — TELEPHONE (OUTPATIENT)
Dept: FAMILY MEDICINE CLINIC | Facility: CLINIC | Age: 48
End: 2023-09-20

## 2023-09-20 DIAGNOSIS — F41.9 ANXIETY: ICD-10-CM

## 2023-09-20 RX ORDER — CLONAZEPAM 2 MG/1
2 TABLET ORAL 3 TIMES DAILY
Qty: 90 TABLET | Refills: 2 | Status: SHIPPED | OUTPATIENT
Start: 2023-09-20

## 2023-09-20 NOTE — TELEPHONE ENCOUNTER
Good Afternoon,    The above FMLA/Disability Form has been completed, faxed to [employer] and scanned into your chart 09/20/23. Please note, that the original form was filed at our , pleas pick-up at your earliest and a $ 30.00 Form Fee is required at that time. If you should require any other assistance, please contact me at [phone] Option 1. Daisy Cotto or Johanne Glasgow  Prior Authorization Coordinators    Lorri Isaacs is only in our office on Tues,Wednesday, & Thurs from 11/:30 am - 4:30 pm the month of September 2023. Thank you, Have a great day and rest of the week!

## 2023-09-22 ENCOUNTER — OFFICE VISIT (OUTPATIENT)
Dept: PHYSICAL THERAPY | Facility: MEDICAL CENTER | Age: 48
End: 2023-09-22
Payer: COMMERCIAL

## 2023-09-22 DIAGNOSIS — M54.16 LUMBAR RADICULITIS: Primary | ICD-10-CM

## 2023-09-22 PROCEDURE — 97110 THERAPEUTIC EXERCISES: CPT | Performed by: PHYSICAL THERAPIST

## 2023-09-22 NOTE — PROGRESS NOTES
Daily Note     Today's date: 2023  Patient name: Arlin Agarwal  : 1975  MRN: [id number]  Referring provider: Anuradha Maldonado DO  Dx:   Encounter Diagnosis     ICD-10-CM    1. Lumbar radiculitis  M54.16                      Subjective: Hemal Gann reports that he is doing better, 80% improved from pre injection       Objective: See treatment diary below      Assessment: Tolerated treatment well. Patient with L glute and posterior leg pain likely from residual neural irritation. Resume therapy to regain function and return to work ability   Plan: Continue per plan of care.       Precautions: None      Manuals             Theragun to L glute  AF             Prone UPA  L5/S1 AF                                       Neuro Re-Ed                                                                                                        Ther Ex             LTR 30            Supine hip abduction 30  black            Supine hip adduction 30            Seated nerve glide 20            Standing alternating shoulder extensions Blue  30            Anti rotation press Blue  30            Box lifts  No weight   15                         Ther Activity                                       Gait Training                                       Modalities             Traction Prone  90#

## 2023-09-25 NOTE — TELEPHONE ENCOUNTER
S/w the patient to inquire. He stated he did feel really good a week ago and now the pain seems to be returning. It continues in his coccyx area and then radiates down the left buttock and into the LLE and his foot feels numb. He continues on the gabapentin although his PCP weaned him down to 300 mg TID. He continues with PT. He is wondering what he should do. Thanks

## 2023-09-25 NOTE — TELEPHONE ENCOUNTER
Caller: Kwame ISSA    Doctor: Dr Trimble     Reason for call: Pt called in to speak with a nurse in regard to the pain since the procedure     Call back#: [phone]

## 2023-09-26 ENCOUNTER — OFFICE VISIT (OUTPATIENT)
Dept: PHYSICAL THERAPY | Facility: MEDICAL CENTER | Age: 48
End: 2023-09-26
Payer: COMMERCIAL

## 2023-09-26 DIAGNOSIS — M54.16 LUMBAR RADICULITIS: Primary | ICD-10-CM

## 2023-09-26 PROCEDURE — 97110 THERAPEUTIC EXERCISES: CPT | Performed by: PHYSICAL THERAPIST

## 2023-09-26 NOTE — PROGRESS NOTES
Daily Note     Today's date: 2023  Patient name: Yimi Levy  : 1975  MRN: [id number]  Referring provider: Yudy Stearns DO  Dx:   Encounter Diagnosis     ICD-10-CM    1. Lumbar radiculitis  M54.16                      Subjective: Hemal Orellana reports that he is doing better, 80% improved from pre injection       Objective: See treatment diary below      Assessment: Tolerated treatment well. Patient with L glute and posterior leg pain likely from residual neural irritation. Resume therapy to regain function and return to work ability   Plan: Continue per plan of care.       Precautions: None      Manuals             Theragun to L glute  AF             Prone UPA  L5/S1 AF                                       Neuro Re-Ed                                                                                                        Ther Ex             LTR 30            Supine hip abduction 30  black            Supine hip adduction 30            Seated nerve glide 20            Standing alternating shoulder extensions Blue  30            Anti rotation press Blue  30            Box lifts  No weight   15            Seated hip ER stretch 10 sec   10             Ther Activity                                       Gait Training                                       Modalities             Traction Prone  90#

## 2023-09-29 ENCOUNTER — OFFICE VISIT (OUTPATIENT)
Dept: PHYSICAL THERAPY | Facility: MEDICAL CENTER | Age: 48
End: 2023-09-29
Payer: COMMERCIAL

## 2023-09-29 DIAGNOSIS — M54.16 LUMBAR RADICULITIS: Primary | ICD-10-CM

## 2023-09-29 PROCEDURE — 97140 MANUAL THERAPY 1/> REGIONS: CPT | Performed by: PHYSICAL THERAPIST

## 2023-09-29 PROCEDURE — 97110 THERAPEUTIC EXERCISES: CPT | Performed by: PHYSICAL THERAPIST

## 2023-09-29 NOTE — PROGRESS NOTES
Daily Note     Today's date: 2023  Patient name: Obie Solitario  : 1975  MRN: [id number]  Referring provider: Arnulfo Silva DO  Dx:   Encounter Diagnosis     ICD-10-CM    1. Lumbar radiculitis  M54.16                      Subjective: Loretta Ortez reports that he is doing better, some reports of occasional numbness in foot       Objective: See treatment diary below      Assessment: Tolerated treatment well. Patient with L glute and posterior leg pain likely from residual neural irritation. Resume therapy to regain function and return to work ability however as he is increasing activity he is reporting some increased radicular symptoms which may make return to work with heavy lifting a bit of a challenge at this time. Plan: Continue per plan of care.       Precautions: None      Manuals             Theragun to L glute  AF             Prone UPA  L5/S1 AF                                       Neuro Re-Ed                                                                                                        Ther Ex             LTR 30            Supine hip abduction 30  black            Supine hip adduction 30            Seated nerve glide 20            Standing alternating shoulder extensions Blue  30            Anti rotation press Blue  30            Box lifts  No weight   15            Seated hip ER stretch 10 sec   10             Ther Activity                                       Gait Training                                       Modalities             Traction Prone  90#

## 2023-10-02 ENCOUNTER — OFFICE VISIT (OUTPATIENT)
Dept: PHYSICAL THERAPY | Facility: MEDICAL CENTER | Age: 48
End: 2023-10-02
Payer: COMMERCIAL

## 2023-10-02 ENCOUNTER — DOCUMENTATION (OUTPATIENT)
Dept: FAMILY MEDICINE CLINIC | Facility: CLINIC | Age: 48
End: 2023-10-02

## 2023-10-02 ENCOUNTER — OFFICE VISIT (OUTPATIENT)
Dept: FAMILY MEDICINE CLINIC | Facility: CLINIC | Age: 48
End: 2023-10-02
Payer: COMMERCIAL

## 2023-10-02 VITALS
BODY MASS INDEX: 28.61 KG/M2 | HEIGHT: 71 IN | SYSTOLIC BLOOD PRESSURE: 132 MMHG | OXYGEN SATURATION: 98 % | DIASTOLIC BLOOD PRESSURE: 78 MMHG | TEMPERATURE: 97.7 F | HEART RATE: 114 BPM | WEIGHT: 204.4 LBS

## 2023-10-02 DIAGNOSIS — E11.9 TYPE 2 DIABETES MELLITUS WITHOUT COMPLICATION, WITHOUT LONG-TERM CURRENT USE OF INSULIN (HCC): ICD-10-CM

## 2023-10-02 DIAGNOSIS — M54.16 LUMBAR RADICULITIS: Primary | ICD-10-CM

## 2023-10-02 DIAGNOSIS — M51.16 LUMBAR DISC DISEASE WITH RADICULOPATHY: ICD-10-CM

## 2023-10-02 DIAGNOSIS — I10 PRIMARY HYPERTENSION: ICD-10-CM

## 2023-10-02 DIAGNOSIS — M51.26 LUMBAR DISC HERNIATION: Primary | ICD-10-CM

## 2023-10-02 DIAGNOSIS — I10 ESSENTIAL HYPERTENSION: ICD-10-CM

## 2023-10-02 PROCEDURE — 4010F ACE/ARB THERAPY RXD/TAKEN: CPT

## 2023-10-02 PROCEDURE — 97110 THERAPEUTIC EXERCISES: CPT | Performed by: PHYSICAL THERAPIST

## 2023-10-02 PROCEDURE — 97140 MANUAL THERAPY 1/> REGIONS: CPT | Performed by: PHYSICAL THERAPIST

## 2023-10-02 PROCEDURE — 99214 OFFICE O/P EST MOD 30 MIN: CPT | Performed by: FAMILY MEDICINE

## 2023-10-02 RX ORDER — LISINOPRIL 5 MG/1
5 TABLET ORAL DAILY
Qty: 90 TABLET | Refills: 3 | Status: SHIPPED | OUTPATIENT
Start: 2023-10-02

## 2023-10-02 NOTE — PROGRESS NOTES
Daily Note     Today's date: 10/2/2023  Patient name: Neo Pena  : 1975  MRN: [id number]  Referring provider: Cynda Schaumann, DO  Dx:   Encounter Diagnosis     ICD-10-CM    1. Lumbar radiculitis  M54.16                      Subjective: Efrain Mena reports that he is doing better, some reports of occasional numbness in foot but this has improved from last week      Objective: See treatment diary below      Assessment: Tolerated treatment well. Progress as able in preparation for return to work    Plan: Continue per plan of care.       Precautions: None      Manuals 10/2            Theragun to L glute  AF             Prone UPA  L5/S1 AF                                       Neuro Re-Ed                                                                                                        Ther Ex             LTR 30            Supine hip abduction 30  black            Supine hip adduction 30            Seated nerve glide 20            Standing alternating shoulder extensions Blue  30            Anti rotation press Blue  30            Box lifts  No weight   15            Seated hip ER stretch 10 sec   10             Ther Activity                                       Gait Training                                       Modalities             Traction Prone  90#

## 2023-10-02 NOTE — PATIENT INSTRUCTIONS
Type 2 Diabetes Management for Adults   AMBULATORY CARE:   Type 2 diabetes  is a disease that affects how your body uses glucose (sugar). Either your body cannot make enough insulin, or it cannot use the insulin correctly. It is important to keep diabetes controlled to prevent damage to your heart, blood vessels, and other organs. Management will help you feel well and enjoy your daily activities. Your diabetes care team providers can help you make a plan to fit diabetes care into your schedule. Your plan can change over time to fit your needs and your family's needs. Have someone call your local emergency number (911 in the [address]) if:   • You cannot be woken. • You have signs of diabetic ketoacidosis:     ? confusion, fatigue    ? vomiting    ? rapid heartbeat    ? fruity smelling breath    ? extreme thirst    ? dry mouth and skin    • You have any of the following signs of a heart attack:      ? Squeezing, pressure, or pain in your chest    ? You may  also have any of the following:     - Discomfort or pain in your back, neck, jaw, stomach, or arm    - Shortness of breath    - Nausea or vomiting    - Lightheadedness or a sudden cold sweat    • You have any of the following signs of a stroke:      ? Numbness or drooping on one side of your face     ? Weakness in an arm or leg    ? Confusion or difficulty speaking    ? Dizziness, a severe headache, or vision loss    Call your doctor or diabetes care team provider if:   • You have a sore or wound that will not heal.    • You have a change in the amount you urinate. • Your blood sugar levels are higher than your target goals. • You often have lower blood sugar levels than your target goals. • Your skin is red, dry, warm, or swollen. • You have trouble coping with diabetes, or you feel anxious or depressed. • You have trouble following any part of your care plan, such as your meal plan.     • You have questions or concerns about your condition or care.    What you need to know about high blood sugar levels:  High blood sugar levels may not cause any symptoms. You may feel more thirsty or urinate more often than usual. Over time, high blood sugar levels can damage your nerves, blood vessels, tissues, and organs. The following can increase your blood sugar levels:  • Large meals or large amounts of carbohydrates at one time    • Less physical activity    • Stress    • Illness    • A lower dose of diabetes medicine or insulin, or a late dose    What you need to know about low blood sugar levels:  Symptoms include feeling shaky, dizzy, irritable, or confused. You can prevent symptoms by keeping your blood sugar levels from going too low. • Treat a low blood sugar level right away:      ? Drink 4 ounces of juice or have 1 tube of glucose gel. ? Check your blood sugar level again 10 to 15 minutes later. ? When the level goes back to normal, eat a meal or snack to prevent another decrease. • Keep glucose gel, raisins, or hard candy with you at all times to treat a low blood sugar level. • Your blood sugar level can get too low if you take diabetes medicine or insulin and do not eat enough food. • If you use insulin, check your blood sugar level before you exercise. ? If your blood sugar level is below 100 mg/dL, eat 4 crackers or 2 ounces of raisins, or drink 4 ounces of juice. ? Check your level every 30 minutes if you exercise longer than 1 hour. ? You may need a snack during or after exercise. What you can do to manage your blood sugar levels:   • Check your blood sugar levels as directed and as needed. Several items are available to use to check your levels. You may need to check by testing a drop of blood in a glucose monitor. You may instead be given a continuous glucose monitoring (CGM) device. The device is worn at all times. The CGM checks your blood sugar level every 5 minutes.  It sends results to an electronic device such as a smart phone. A CGM can be used with or without an insulin pump. You and your diabetes care team providers will decide on the best method for you. The goal for blood sugar levels before meals  is between 80 and 130 mg/dL and 2 hours after eating  is lower than 180 mg/dL. • Make healthy food choices. Work with a dietitian to create a meal plan that works for you and your schedule. A dietitian can help you learn how to eat the right amount of carbohydrates (sugar and starchy foods) during your meals and snacks. Examples of carbohydrates are breads, cereals, rice, pasta, fruit, low-fat dairy, and sweets. Carbohydrates can raise your blood sugar level if you eat too many at one time. • Eat high-fiber foods as directed. Fiber helps improve blood sugar levels. Fiber also lowers your risk for heart disease and other problems diabetes can cause. Examples of high-fiber foods include vegetables, whole-grain bread, and beans such as hilario beans. Your dietitian can tell you how much fiber to have each day. • Get regular physical activity. Physical activity can help you get to your target blood sugar level goal and manage your weight. Get at least 150 minutes of moderate to vigorous aerobic physical activity each week. Resistance training, such as lifting weights, should be done 3 times each week. Do not miss more than 2 days of physical activity in a row. Do not sit longer than 30 minutes at a time. Your healthcare provider can help you create an activity plan. The plan can include the best activities for you and can help you build your strength and endurance. • Maintain a healthy weight. Ask your team what a healthy weight is for you. A healthy weight can help you control diabetes and prevent heart disease. Ask your team to help you create a weight-loss plan, if needed. Even a loss of 3% to 7% of your excess body weight can help make a difference in managing diabetes.  Your team will help you set a weight-loss goal, such as 10 to 15 pounds, or 5% of your extra weight. Together you and your team can set manageable weight-loss goals. • Take your diabetes medicine or insulin as directed. You may need diabetes medicine, insulin, or both to help control your blood sugar levels. Your healthcare provider will teach you how and when to take your diabetes medicine or insulin. You will also be taught about side effects oral diabetes medicine can cause. Insulin may be injected or given through a pump or pen. You and your providers will decide on the best method for you:    ? An insulin pump  is an implanted device that gives your insulin 24 hours a day. An insulin pump prevents the need for multiple insulin injections in a day. ? An insulin pen  is a device prefilled with the right amount of insulin. ? You and your family members will be taught how to draw up and give insulin  if this is the best method for you. Your providers will also teach you how to dispose of needles and syringes. ? You will learn how much insulin you need  and when to give it. You will be taught when not to give insulin. You will also be taught what to do if your blood sugar level drops too low. This may happen if you take insulin and do not eat the right amount of carbohydrates. More ways to manage type 2 diabetes:   • Wear medical alert identification. Wear medical alert jewelry or carry a card that says you have diabetes. Ask your provider where to get these items. • Do not smoke. Nicotine and other chemicals in cigarettes and cigars can cause lung and blood vessel damage. It also makes it more difficult to manage your diabetes. Ask your provider for information if you currently smoke and need help to quit. Do not use e-cigarettes or smokeless tobacco in place of cigarettes or to help you quit. They still contain nicotine.     • Check your feet each day for cuts, scratches, calluses, or other wounds. Look for redness and swelling, and feel for warmth. Wear shoes that fit well. Check your shoes for rocks or other objects that can hurt your feet. Do not walk barefoot or wear shoes without socks. Wear cotton socks to help keep your feet dry. • Ask about vaccines you may need. You have a higher risk for serious illness if you get the flu, pneumonia, COVID-19, or hepatitis. Ask your provider if you should get vaccines to prevent these or other diseases, and when to get the vaccines. • Talk to your provider if you become stressed about diabetes care. Sometimes being able to fit diabetes care into your life can cause increased stress. The stress can cause you not to take care of yourself properly. Your provider can help by offering tips about self-care. A mental health provider can listen and offer help with self-care issues. Other types of counseling can help you make nutrition or physical activity changes. • Have your A1c checked as directed. Your provider may check your A1c every 3 months, or 2 times each year if your diabetes is controlled. An A1c test shows the average amount of sugar in your blood over the past 2 to 3 months. Your provider will tell you what your A1c level should be. • Have screening tests as directed. Your provider may recommend screening for complications of diabetes and other conditions that may develop. Some screenings may begin right away and some may happen within the first 5 years of diagnosis:    ? Examples of diabetes complications  include kidney problems, high cholesterol, high blood pressure, blood vessel problems, eye problems, and sleep apnea. ? You may be screened for a low vitamin B level  if you take oral diabetes medicine for a long time. ? You may be screened for polycystic ovarian syndrome (PCOS)  if you are of childbearing age. Follow up with your doctor or diabetes care team providers as directed:   You may need to have blood tests done before your follow-up visit. The test results will show if changes need to be made in your treatment or self-care. Talk to your provider if you cannot afford your medicine. Write down your questions so you remember to ask them during your visits. © Copyright [employer] 2023 Information is for End User's use only and may not be sold, redistributed or otherwise used for commercial purposes. The above information is an  only. It is not intended as medical advice for individual conditions or treatments. Talk to your doctor, nurse or pharmacist before following any medical regimen to see if it is safe and effective for you.

## 2023-10-02 NOTE — PROGRESS NOTES
Assessment/Plan: Guidance given overall. Patient will continue with current regimen for hypertension. Blood pressure will be rechecked at follow-up. Patient will try to exercise and diet in the future once his lumbar radiculopathy resolves. Patient will continue with physical therapy and follow-up with pain management appropriately. Patient will continue to remain out of work and will have letter stating he will be out of work for the next 2 weeks. Patient will follow-up in 2 weeks. Patient will continue with other meds per routine. Patient will need to check A1c in the future. Follow-up 2 weeks       Diagnoses and all orders for this visit:    Lumbar disc herniation    Essential hypertension  -     lisinopril (ZESTRIL) 5 mg tablet; Take 1 tablet (5 mg total) by mouth daily    Primary hypertension    Lumbar disc disease with radiculopathy    Type 2 diabetes mellitus without complication, without long-term current use of insulin ([facility])  -     Hemoglobin A1C; Future            Subjective:        Patient ID: Kassidy Eason is a 51 y.o. male. Patient is here to follow-up on lumbar radiculopathy with history of herniated disc L5-S1 along with following up on hypertension. Denies chest pain shortness of breath. No significant visual disturbance. Patient with ongoing lumbar pain with radiation to his left leg which is improving over time. Patient also with numbness in the left leg. Patient is working with physical therapy. Is having a change urination or defecation. No right leg involvement. Patient still out of work. The following portions of the patient's history were reviewed and updated as appropriate: allergies, current medications, past family history, past medical history, past social history, past surgical history and problem list.      Review of Systems   Constitutional: Negative. HENT: Negative. Eyes: Negative. Respiratory: Negative. Cardiovascular: Negative. Gastrointestinal: Negative. Endocrine: Negative. Genitourinary: Negative. Musculoskeletal: Positive for back pain. Skin: Negative. Allergic/Immunologic: Negative. Neurological: Positive for numbness. Hematological: Negative. Psychiatric/Behavioral: Negative. Objective:        Depression Screening and Follow-up Plan: Clincally patient does not have depression. No treatment is required. /78 (BP Location: Right arm, Patient Position: Sitting, Cuff Size: Large)   Pulse (!) 114   Temp 97.7 °F (36.5 °C) (Tympanic)   Ht 5' 11" (1.803 m)   Wt 92.7 kg (204 lb 6.4 oz)   SpO2 98%   BMI 28.51 kg/m²          Physical Exam  Vitals and nursing note reviewed. Constitutional:       General: He is not in acute distress. Appearance: Normal appearance. He is not ill-appearing, toxic-appearing or diaphoretic. HENT:      Head: Normocephalic and atraumatic. Right Ear: Tympanic membrane, ear canal and external ear normal. There is no impacted cerumen. Left Ear: Tympanic membrane, ear canal and external ear normal. There is no impacted cerumen. Nose: Nose normal. No congestion or rhinorrhea. Mouth/Throat:      Mouth: Mucous membranes are moist.      Pharynx: No oropharyngeal exudate or posterior oropharyngeal erythema. Eyes:      General: No scleral icterus. Right eye: No discharge. Left eye: No discharge. Neck:      Vascular: No carotid bruit. Cardiovascular:      Rate and Rhythm: Normal rate and regular rhythm. Pulses: Normal pulses. Heart sounds: Normal heart sounds. No murmur heard. No friction rub. No gallop. Pulmonary:      Effort: Pulmonary effort is normal. No respiratory distress. Breath sounds: Normal breath sounds. No stridor. No wheezing, rhonchi or rales. Chest:      Chest wall: No tenderness. Musculoskeletal:         General: Tenderness present. No swelling, deformity or signs of injury. Cervical back: Normal range of motion and neck supple. No rigidity. No muscular tenderness. Right lower leg: No edema. Left lower leg: No edema. Lymphadenopathy:      Cervical: No cervical adenopathy. Skin:     General: Skin is warm and dry. Capillary Refill: Capillary refill takes less than 2 seconds. Coloration: Skin is not jaundiced. Findings: No bruising, erythema, lesion or rash. Neurological:      Mental Status: He is alert and oriented to person, place, and time. Cranial Nerves: No cranial nerve deficit. Sensory: Sensory deficit present. Motor: No weakness. Coordination: Coordination normal.      Gait: Gait abnormal.   Psychiatric:         Mood and Affect: Mood normal.         Behavior: Behavior normal.         Thought Content:  Thought content normal.         Judgment: Judgment normal.

## 2023-10-04 ENCOUNTER — OFFICE VISIT (OUTPATIENT)
Dept: PHYSICAL THERAPY | Facility: MEDICAL CENTER | Age: 48
End: 2023-10-04
Payer: COMMERCIAL

## 2023-10-04 DIAGNOSIS — M54.16 LUMBAR RADICULITIS: Primary | ICD-10-CM

## 2023-10-04 PROCEDURE — 97140 MANUAL THERAPY 1/> REGIONS: CPT | Performed by: PHYSICAL THERAPIST

## 2023-10-04 PROCEDURE — 97110 THERAPEUTIC EXERCISES: CPT | Performed by: PHYSICAL THERAPIST

## 2023-10-04 NOTE — PROGRESS NOTES
Daily Note     Today's date: 10/4/2023  Patient name: Leonardo Cooley  : 1975  MRN: [id number]  Referring provider: Marcelle Monterroso DO  Dx:   Encounter Diagnosis     ICD-10-CM    1. Lumbar radiculitis  M54.16                      Subjective: Kimmy Reza reports that he is doing better, some reports of occasional numbness in foot but this has improved from last week      Objective: See treatment diary below      Assessment: Tolerated treatment well. Progress as able in preparation for return to work    Plan: Continue per plan of care.       Precautions: None      Manuals 10/5            Theragun to L glute  AF             Prone UPA  L5/S1 AF                                       Neuro Re-Ed                                                                                                        Ther Ex             LTR 30            Supine hip abduction 30  black            Supine hip adduction 30            Seated nerve glide 20            Standing alternating shoulder extensions Blue  30            Anti rotation press Blue  30            Box lifts  No weight   15            Seated hip ER stretch 10 sec   10             Ther Activity                                       Gait Training                                       Modalities             Traction Prone  90#

## 2023-10-06 ENCOUNTER — OFFICE VISIT (OUTPATIENT)
Dept: PHYSICAL THERAPY | Facility: MEDICAL CENTER | Age: 48
End: 2023-10-06
Payer: COMMERCIAL

## 2023-10-06 DIAGNOSIS — M54.16 LUMBAR RADICULITIS: Primary | ICD-10-CM

## 2023-10-06 PROCEDURE — 97140 MANUAL THERAPY 1/> REGIONS: CPT | Performed by: PHYSICAL THERAPIST

## 2023-10-06 PROCEDURE — 97110 THERAPEUTIC EXERCISES: CPT | Performed by: PHYSICAL THERAPIST

## 2023-10-06 NOTE — PROGRESS NOTES
Daily Note     Today's date: 10/6/2023  Patient name: Kiko Bull  : 1975  MRN: [id number]  Referring provider: Kash Guan DO  Dx:   Encounter Diagnosis     ICD-10-CM    1. Lumbar radiculitis  M54.16                      Subjective: Liyah Peters reports that he is doing better, some reports of occasional numbness in foot but this has improved from last week      Objective: See treatment diary below      Assessment: Tolerated treatment well. Progress as able in preparation for return to work    Plan: Continue per plan of care.       Precautions: None      Manuals 10/6            Theragun to L glute  AF             Prone UPA  L5/S1 AF                                       Neuro Re-Ed                                                                                                        Ther Ex             LTR 30            Supine hip abduction 30  black            Supine hip adduction 30            Seated nerve glide 20            Standing alternating shoulder extensions Blue  30            Anti rotation press Blue  30            Box lifts  No weight   15            Seated hip ER stretch 10 sec   10             Ther Activity                                       Gait Training                                       Modalities             Traction Prone  90#

## 2023-10-11 ENCOUNTER — OFFICE VISIT (OUTPATIENT)
Dept: PHYSICAL THERAPY | Facility: MEDICAL CENTER | Age: 48
End: 2023-10-11
Payer: COMMERCIAL

## 2023-10-11 DIAGNOSIS — M54.16 LUMBAR RADICULITIS: Primary | ICD-10-CM

## 2023-10-11 PROCEDURE — 97140 MANUAL THERAPY 1/> REGIONS: CPT | Performed by: PHYSICAL THERAPIST

## 2023-10-11 PROCEDURE — 97110 THERAPEUTIC EXERCISES: CPT | Performed by: PHYSICAL THERAPIST

## 2023-10-11 NOTE — PROGRESS NOTES
Daily Note     Today's date: 10/11/2023  Patient name: Verenice Coronado  : 1975  MRN: [id number]  Referring provider: Zeinab Payne DO  Dx:   Encounter Diagnosis     ICD-10-CM    1. Lumbar radiculitis  M54.16                      Subjective: Alejandro Fernandes reports that he is doing better, some reports of occasional numbness in foot but this has improved from last week, gradually increasing his lifting tolerance       Objective: See treatment diary below      Assessment: Tolerated treatment well. Progress as able in preparation for return to work    Plan: Continue per plan of care.       Precautions: None      Manuals 10/11            Theragun to L glute  AF             Prone UPA  L5/S1 AF                                       Neuro Re-Ed                                                                                                        Ther Ex             LTR 30            Supine hip abduction 30  black            Supine hip adduction 30            Seated nerve glide 20            Standing alternating shoulder extensions Blue  30            Anti rotation press Blue  30            Box lifts  30#  15            Cart push 50#  5 laps             Seated hip ER stretch 10 sec   10             Ther Activity                                       Gait Training                                       Modalities             Traction Prone  90#

## 2023-10-13 ENCOUNTER — OFFICE VISIT (OUTPATIENT)
Dept: PHYSICAL THERAPY | Facility: MEDICAL CENTER | Age: 48
End: 2023-10-13
Payer: COMMERCIAL

## 2023-10-13 DIAGNOSIS — M54.16 LUMBAR RADICULITIS: Primary | ICD-10-CM

## 2023-10-13 PROCEDURE — 97140 MANUAL THERAPY 1/> REGIONS: CPT | Performed by: PHYSICAL THERAPIST

## 2023-10-13 PROCEDURE — 97110 THERAPEUTIC EXERCISES: CPT | Performed by: PHYSICAL THERAPIST

## 2023-10-13 NOTE — PROGRESS NOTES
Daily Note     Today's date: 10/13/2023  Patient name: Kiko Bull  : 1975  MRN: [id number]  Referring provider: Kash Guan DO  Dx:   Encounter Diagnosis     ICD-10-CM    1. Lumbar radiculitis  M54.16                      Subjective: Liyah Peters reports that he is doing better, some reports of occasional numbness in foot but this has improved from last week, gradually increasing his lifting tolerance       Objective: See treatment diary below      Assessment: Tolerated treatment well. Progress as able in preparation for return to work    Plan: Continue per plan of care.       Precautions: None      Manuals 10/13            Theragun to L glute  AF             Prone UPA  L5/S1 AF                                       Neuro Re-Ed                                                                                                        Ther Ex             LTR 30            Supine hip abduction 30  black            Supine hip adduction 30            Seated nerve glide 20            Standing alternating shoulder extensions Blue  30            Anti rotation press Blue  30            Box lifts  35#  15            Cart push 50#  5 laps             Seated hip ER stretch 10 sec   10             Ther Activity                                       Gait Training                                       Modalities             Traction Prone  90#

## 2023-10-17 ENCOUNTER — OFFICE VISIT (OUTPATIENT)
Dept: FAMILY MEDICINE CLINIC | Facility: CLINIC | Age: 48
End: 2023-10-17
Payer: COMMERCIAL

## 2023-10-17 VITALS
HEART RATE: 97 BPM | OXYGEN SATURATION: 99 % | DIASTOLIC BLOOD PRESSURE: 70 MMHG | WEIGHT: 208.2 LBS | BODY MASS INDEX: 29.15 KG/M2 | TEMPERATURE: 98.8 F | SYSTOLIC BLOOD PRESSURE: 110 MMHG | HEIGHT: 71 IN

## 2023-10-17 DIAGNOSIS — E11.9 TYPE 2 DIABETES MELLITUS WITHOUT COMPLICATION, WITHOUT LONG-TERM CURRENT USE OF INSULIN (HCC): ICD-10-CM

## 2023-10-17 DIAGNOSIS — M51.16 LUMBAR DISC DISEASE WITH RADICULOPATHY: Primary | ICD-10-CM

## 2023-10-17 PROCEDURE — 99214 OFFICE O/P EST MOD 30 MIN: CPT | Performed by: FAMILY MEDICINE

## 2023-10-17 NOTE — PROGRESS NOTES
Assessment/Plan: Patient doing much better at this time. Patient will continue with physical therapy. Patient will follow with pain management appropriately. Note for patient to return to work tomorrow Wednesday, October 18. Patient in agreement. Patient will have laboratory studies for diabetes. Patient will continue with current regimen of medications. Diagnoses and all orders for this visit:    Lumbar disc disease with radiculopathy    Type 2 diabetes mellitus without complication, without long-term current use of insulin (HCC)  -     CBC and differential; Future  -     Comprehensive metabolic panel; Future  -     Hemoglobin A1C; Future  -     Lipid panel; Future  -     Albumin / creatinine urine ratio  -     TSH, 3rd generation with Free T4 reflex; Future            Subjective:        Patient ID: Keara Fair is a 51 y.o. male. Patient is here to follow-up on lumbar radiculopathy on left. Patient is working with physical therapy. Patient did have 1 injection in his back. No pain in the left leg or buttocks or back at this time. Patient does have numbness into the left buttocks and posterior thigh region. Normal urination and defecation. No right leg involvement. Patient is off gabapentin as well as meloxicam.          The following portions of the patient's history were reviewed and updated as appropriate: allergies, current medications, past family history, past medical history, past social history, past surgical history and problem list.      Review of Systems   Constitutional: Negative. HENT: Negative. Eyes: Negative. Respiratory: Negative. Cardiovascular: Negative. Gastrointestinal: Negative. Endocrine: Negative. Genitourinary: Negative. Musculoskeletal: Negative. Skin: Negative. Allergic/Immunologic: Negative. Neurological:  Positive for numbness. Hematological: Negative. Psychiatric/Behavioral: Negative.              Objective:      BMI Counseling: Body mass index is 29.04 kg/m². The BMI is above normal. Nutrition recommendations include encouraging healthy choices of fruits and vegetables. Exercise recommendations include moderate physical activity 150 minutes/week. Rationale for BMI follow-up plan is due to patient being overweight or obese. Depression Screening and Follow-up Plan: Clincally patient does not have depression. No treatment is required. /70 (BP Location: Right arm, Patient Position: Sitting, Cuff Size: Large)   Pulse 97   Temp 98.8 °F (37.1 °C) (Temporal)   Ht 5' 11" (1.803 m)   Wt 94.4 kg (208 lb 3.2 oz)   SpO2 99%   BMI 29.04 kg/m²          Physical Exam  Vitals and nursing note reviewed. Constitutional:       Appearance: Normal appearance. Musculoskeletal:         General: No tenderness. Right lower leg: Edema present. Left lower leg: Edema present. Comments: No lumbar pain with palpation. Negative straight leg raises bilaterally lower extremities   Skin:     Capillary Refill: Capillary refill takes less than 2 seconds. Neurological:      Mental Status: He is alert. Psychiatric:         Mood and Affect: Mood normal.         Behavior: Behavior normal.         Thought Content:  Thought content normal.         Judgment: Judgment normal.

## 2023-10-30 ENCOUNTER — OFFICE VISIT (OUTPATIENT)
Dept: PAIN MEDICINE | Facility: CLINIC | Age: 48
End: 2023-10-30
Payer: COMMERCIAL

## 2023-10-30 VITALS
WEIGHT: 208 LBS | DIASTOLIC BLOOD PRESSURE: 78 MMHG | RESPIRATION RATE: 16 BRPM | HEIGHT: 71 IN | HEART RATE: 82 BPM | SYSTOLIC BLOOD PRESSURE: 124 MMHG | BODY MASS INDEX: 29.12 KG/M2

## 2023-10-30 DIAGNOSIS — M16.12 ARTHRITIS OF LEFT HIP: Primary | ICD-10-CM

## 2023-10-30 DIAGNOSIS — M25.552 LEFT HIP PAIN: ICD-10-CM

## 2023-10-30 PROCEDURE — 99214 OFFICE O/P EST MOD 30 MIN: CPT

## 2023-10-30 NOTE — PROGRESS NOTES
Assessment:  1. Arthritis of left hip    2. Left hip pain        Plan:  The patient is a 48-year-old male with a history of chronic pain secondary to low back pain, lumbar intervertebral disc disorder with radiculopathy, lumbar disc disease and lumbar disc herniation who presents to the office with improved low back pain after undergoing an L5-S1 LESI on 9/11/2023 and ongoing left hip pain. X-ray of left hip shows mild arthritis. I suspect his ongoing pain is coming from his left hip and would benefit from a left hip intra-articular injection to decrease inflammation and provide him relief. Patient would like to proceed. I instructed our  will schedule him. Complete risks and benefits including bleeding, infection, tissue reaction, nerve injury and allergic reaction were discussed. The approach was demonstrated using models and literature was provided. Verbal and written consent was obtained. Can also consider repeat LESI if low back pain worsens. My impressions and treatment recommendations were discussed in detail with the patient who verbalized understanding and had no further questions. Discharge instructions were provided. I personally saw and examined the patient and I agree with the above discussed plan of care. Orders Placed This Encounter   Procedures    [place] spine and pain procedure     Dr Farhan Brown     Standing Status:   Future     Standing Expiration Date:   10/30/2027     Order Specific Question:   Reason for Exam:     Answer:   Left hip intra-articular injection     Order Specific Question:   Anticoagulant hold needed? Answer:   No     No orders of the defined types were placed in this encounter. History of Present Illness:  Minh Shipley is a 51 y.o. male with a history of chronic pain secondary to low back pain, lumbar intervertebral disc disorder with radiculopathy, lumbar disc disease and lumbar disc herniation.   He was last seen on 9/11/2023 where he underwent an L5-S1 LESI which provided and continues to provide greater than 50% relief of his low back pain. He presents to the office with improved low back pain and ongoing left hip pain. He states his hip pain is intermittent but worse in the morning and evening. He rates the quality of his pain as throbbing, numbness and is currently rating his pain a 2-5/10 on a numeric scale. He is not currently taking anything for pain. I have personally reviewed and/or updated the patient's past medical history, past surgical history, family history, social history, current medications, allergies, and vital signs today. Review of Systems   Respiratory:  Negative for shortness of breath. Cardiovascular:  Negative for chest pain. Gastrointestinal:  Negative for constipation, diarrhea, nausea and vomiting. Musculoskeletal:  Positive for back pain and joint swelling. Negative for arthralgias, gait problem and myalgias. Left Hip Pain   Skin:  Negative for rash. Neurological:  Negative for dizziness, seizures and weakness. All other systems reviewed and are negative.       Patient Active Problem List   Diagnosis    Erectile dysfunction of non-organic origin    Esophageal reflux    Generalized anxiety disorder    Hemorrhoid    Hypercholesterolemia    Hypertension    Vitamin D deficiency    Chronic pain of left knee    Insect sting    Influenza    Acute non-recurrent maxillary sinusitis    Lumbar radiculitis    Lipoma of breast    Elevated blood pressure reading    Subcutaneous nodule    Poison ivy    Bee sting    Cellulitis of right lower extremity    Raynaud's disease without gangrene    Type 2 diabetes mellitus without complication, without long-term current use of insulin ([facility])    Lumbar strain, initial encounter    Slow transit constipation    Dermatitis    Subcutaneous nodule of lower extremity    Lumbar disc herniation    Lumbar disc disease with radiculopathy    Well adult exam       Past Medical History: Diagnosis Date    Anxiety     Chronic pain disorder     knees    Erectile dysfunction     Hyperlipidemia     denies    Hypertension     Impaired fasting blood sugar     Lumbar radiculitis     Right shoulder pain     resolved. .. [facility] 1/18/18    Vitamin D deficiency        Past Surgical History:   Procedure Laterality Date    ABDOMINAL MASS RESECTION      excision of intra       resolved. ... 2011    INGUINAL HERNIA REPAIR      resolved. ...2004    LIPOMA RESECTION      ND EXC CYST/ABERRANT BREAST TISSUE OPEN 1/> LESION Left 4/19/2019    Procedure: EXCISION BREAST MASS;  Surgeon: Verena Spann DO;  Location: [facility] MAIN OR;  Service: General    ND EXCISION TUMOR SOFT TISSUE BACK/FLANK SUBQ <3CM Left 4/19/2019    Procedure: EXCISION FLANK MASS;  Surgeon: Verena Spann DO;  Location: [facility] MAIN OR;  Service: General       Family History   Problem Relation Age of Onset    Hypertension Mother     Diabetes type II Mother     Hypertension Father        Social History     Occupational History    Occupation: [email or web address]     Tobacco Use    Smoking status: Never    Smokeless tobacco: Never   Vaping Use    Vaping Use: Never used   Substance and Sexual Activity    Alcohol use: Not Currently     Comment: social     Drug use: No    Sexual activity: Yes     Partners: Female       Current Outpatient Medications on File Prior to Visit   Medication Sig    clonazePAM (KlonoPIN) 2 mg tablet Take 1 tablet (2 mg total) by mouth 3 (three) times a day    lisinopril (ZESTRIL) 5 mg tablet Take 1 tablet (5 mg total) by mouth daily    mometasone (ELOCON) 0.1 % cream Apply topically daily    Multiple Vitamins-Minerals (VISION-JONAS PRESERVE PO) Take by mouth    rosuvastatin (CRESTOR) 5 mg tablet Take 1 tablet (5 mg total) by mouth daily    Vardenafil HCl 10 MG TBDP 1 tablet daily as needed. No current facility-administered medications on file prior to visit.        Allergies   Allergen Reactions    Amoxicillin Hives    Penicillins Hives       Physical Exam:    /78   Pulse 82   Resp 16   Ht 5' 11" (1.803 m)   Wt 94.3 kg (208 lb)   BMI 29.01 kg/m²     Constitutional:normal, well developed, well nourished, alert, in no distress and non-toxic and no overt pain behavior.   Eyes:anicteric  HEENT:grossly intact  Neck:supple, symmetric, trachea midline and no masses   Pulmonary:even and unlabored  Cardiovascular:No edema or pitting edema present  Skin:Normal without rashes or lesions and well hydrated  Psychiatric:Mood and affect appropriate  Neurologic:Cranial Nerves II-XII grossly intact  Musculoskeletal:normal    Imaging

## 2023-11-05 ENCOUNTER — VBI (OUTPATIENT)
Dept: ADMINISTRATIVE | Facility: OTHER | Age: 48
End: 2023-11-05

## 2023-11-06 ENCOUNTER — HOSPITAL ENCOUNTER (OUTPATIENT)
Dept: RADIOLOGY | Facility: CLINIC | Age: 48
Discharge: HOME | End: 2023-11-06
Payer: COMMERCIAL

## 2023-11-06 VITALS
HEART RATE: 96 BPM | SYSTOLIC BLOOD PRESSURE: 117 MMHG | DIASTOLIC BLOOD PRESSURE: 80 MMHG | TEMPERATURE: 98.2 F | OXYGEN SATURATION: 98 % | RESPIRATION RATE: 16 BRPM

## 2023-11-06 DIAGNOSIS — M16.12 ARTHRITIS OF LEFT HIP: ICD-10-CM

## 2023-11-06 PROCEDURE — 77002 NEEDLE LOCALIZATION BY XRAY: CPT | Performed by: PHYSICAL MEDICINE & REHABILITATION

## 2023-11-06 PROCEDURE — 20610 DRAIN/INJ JOINT/BURSA W/O US: CPT | Performed by: PHYSICAL MEDICINE & REHABILITATION

## 2023-11-06 RX ORDER — 0.9 % SODIUM CHLORIDE 0.9 %
10 VIAL (ML) INJECTION ONCE
Status: COMPLETED | OUTPATIENT
Start: 2023-11-06 | End: 2023-11-06

## 2023-11-06 RX ORDER — METHYLPREDNISOLONE ACETATE 80 MG/ML
80 INJECTION, SUSPENSION INTRA-ARTICULAR; INTRALESIONAL; INTRAMUSCULAR; PARENTERAL; SOFT TISSUE ONCE
Status: COMPLETED | OUTPATIENT
Start: 2023-11-06 | End: 2023-11-06

## 2023-11-06 RX ORDER — BUPIVACAINE HCL/PF 2.5 MG/ML
3 VIAL (ML) INJECTION ONCE
Status: COMPLETED | OUTPATIENT
Start: 2023-11-06 | End: 2023-11-06

## 2023-11-06 RX ADMIN — METHYLPREDNISOLONE ACETATE 80 MG: 80 INJECTION, SUSPENSION INTRA-ARTICULAR; INTRALESIONAL; INTRAMUSCULAR; PARENTERAL; SOFT TISSUE at 10:09

## 2023-11-06 RX ADMIN — SODIUM CHLORIDE 2 ML: 9 INJECTION, SOLUTION INTRAMUSCULAR; INTRAVENOUS; SUBCUTANEOUS at 10:08

## 2023-11-06 RX ADMIN — Medication 2 ML: at 10:08

## 2023-11-06 RX ADMIN — IOHEXOL 1 ML: 300 INJECTION, SOLUTION INTRAVENOUS at 10:09

## 2023-11-06 RX ADMIN — BUPIVACAINE HYDROCHLORIDE 3 ML: 2.5 INJECTION, SOLUTION EPIDURAL; INFILTRATION; INTRACAUDAL at 10:09

## 2023-11-06 NOTE — H&P
Fell 2 weeks ago. Went for Juwan Schwab which were negative. Now has prepatellar fluctuance and some knee tenderness. He is walking without and assistive device with full weight bearing. Left knee with prepatellar fluctuance and redness. His extensor mechanism is intact. Ligaments are intact. There is no knee effusion. A/P: Left prepatellar bursa hematoma. Should resolve over the next 6 weeks. Recommend observation. WBAT. History of Present Illness: The patient is a 51 y.o. male who presents with complaints of left hip pain  Past Medical History:   Diagnosis Date    Anxiety     Chronic pain disorder     knees    Erectile dysfunction     Hyperlipidemia     denies    Hypertension     Impaired fasting blood sugar     Lumbar radiculitis     Right shoulder pain     resolved. .. [facility] 1/18/18    Vitamin D deficiency        Past Surgical History:   Procedure Laterality Date    ABDOMINAL MASS RESECTION      excision of intra       resolved. ... 2011    INGUINAL HERNIA REPAIR      resolved. ...2004    LIPOMA RESECTION      GA EXC CYST/ABERRANT BREAST TISSUE OPEN 1/> LESION Left 4/19/2019    Procedure: EXCISION BREAST MASS;  Surgeon: Rosanna Garcia DO;  Location: [address] [place] [address] MAIN OR;  Service: General    GA EXCISION TUMOR SOFT TISSUE BACK/FLANK SUBQ <3CM Left 4/19/2019    Procedure: EXCISION FLANK MASS;  Surgeon: Rosanna Garcia DO;  Location:  MAIN OR;  Service: General         Current Outpatient Medications:     clonazePAM (KlonoPIN) 2 mg tablet, Take 1 tablet (2 mg total) by mouth 3 (three) times a day, Disp: 90 tablet, Rfl: 2    lisinopril (ZESTRIL) 5 mg tablet, Take 1 tablet (5 mg total) by mouth daily, Disp: 90 tablet, Rfl: 3    mometasone (ELOCON) 0.1 % cream, Apply topically daily, Disp: 45 g, Rfl: 2    Multiple Vitamins-Minerals (VISION-JONAS PRESERVE PO), Take by mouth, Disp: , Rfl:     rosuvastatin (CRESTOR) 5 mg tablet, Take 1 tablet (5 mg total) by mouth daily, Disp: 90 tablet, Rfl: 1    Vardenafil HCl 10 MG TBDP, 1 tablet daily as needed. , Disp: 4 tablet, Rfl: 11    Current Facility-Administered Medications:     bupivacaine (PF) (MARCAINE) 0.25 % injection 3 mL, 3 mL, Intra-articular, Once, Celesta Like, DO    iohexol (OMNIPAQUE) 300 mg/mL injection 1 mL, 1 mL, Intra-articular, Once, Celesta Like, DO    lidocaine (PF) (XYLOCAINE-MPF) 2 % injection 2 mL, 2 mL, Infiltration, Once, Celesta Like, DO    methylPREDNISolone acetate (DEPO-MEDROL) injection 80 mg, 80 mg, Intra-articular, Once, Re Neville,     sodium chloride (PF) 0.9 % injection 10 mL, 10 mL, Infiltration, Once, Re Neville, DO    Allergies   Allergen Reactions    Amoxicillin Hives    Penicillins Hives       Physical Exam:   Vitals:    11/06/23 1000   BP: 131/88   Pulse: 71   Resp: 16   Temp: 98.2 °F (36.8 °C)   SpO2: 98%     General: Awake, Alert, Oriented x 3, Mood and affect appropriate  Respiratory: Respirations even and unlabored  Cardiovascular: Peripheral pulses intact; no edema  Musculoskeletal Exam: Tenderness palpation left lateral hip    ASA Score: 2  Patient/Chart Verification  Patient ID Verified: Verbal  ID Band Applied: No  Consents Confirmed: Procedural, To be obtained in the Pre-Procedure area  H&P( within 30 days) Verified: To be obtained in the Pre-Procedure area  Allergies Reviewed: Yes  Anticoag/NSAID held?: No  Currently on antibiotics?: No    Assessment:   1.  Arthritis of left hip        Plan: Left hip intra-articular injection

## 2023-11-06 NOTE — DISCHARGE INSTR - LAB
Do not apply heat to any area that is numb. If you have discomfort or soreness at the injection site, you may apply ice today, 20 minutes on and 20 minutes off. Tomorrow you may use ice or warm, moist heat. Do not apply ice or heat directly to the skin. If you experience severe shortness of breath, go to the Emergency Room. You may have numbness for several hours from the local anesthetic. Please use caution and common sense, especially with weight-bearing activities. You may have an increase or change in the discomfort for 36-48 hours after your treatment. Apply ice and continue with any pain medicine you have been prescribed. Do not do anything strenuous today. You may shower, but no tub baths or hot tubs today. You may resume your normal activities tomorrow, but do not “overdo it”. Resume normal activities slowly when you are feeling better. If you experience redness, drainage or swelling at the injection site, or if you develop a fever above 100 degrees, please call [facility] at [phone] or go to the Emergency Room. Continue to take all routine medicines prescribed by your primary care physician unless otherwise instructed by our staff. Most blood thinners should be started again according to your regularly scheduled dosing. If you have any questions, please give our office a call. As no general anesthesia was used in today's procedure, you should not experience any side effects related to anesthesia. If you have a problem specifically related to your procedure, please call our office at [phone]. Problems not related to your procedure should be directed to your primary care physician.

## 2023-11-13 ENCOUNTER — TELEPHONE (OUTPATIENT)
Dept: PAIN MEDICINE | Facility: CLINIC | Age: 48
End: 2023-11-13

## 2023-11-17 PROBLEM — Z00.00 WELL ADULT EXAM: Status: RESOLVED | Noted: 2023-09-18 | Resolved: 2023-11-17

## 2023-12-04 ENCOUNTER — OFFICE VISIT (OUTPATIENT)
Dept: FAMILY MEDICINE CLINIC | Facility: CLINIC | Age: 48
End: 2023-12-04
Payer: COMMERCIAL

## 2023-12-04 VITALS
OXYGEN SATURATION: 98 % | HEART RATE: 84 BPM | WEIGHT: 203 LBS | SYSTOLIC BLOOD PRESSURE: 118 MMHG | TEMPERATURE: 96.3 F | HEIGHT: 72 IN | DIASTOLIC BLOOD PRESSURE: 80 MMHG | BODY MASS INDEX: 27.5 KG/M2

## 2023-12-04 DIAGNOSIS — E78.00 HYPERCHOLESTEROLEMIA: ICD-10-CM

## 2023-12-04 DIAGNOSIS — E11.9 TYPE 2 DIABETES MELLITUS WITHOUT COMPLICATION, WITHOUT LONG-TERM CURRENT USE OF INSULIN (HCC): Primary | ICD-10-CM

## 2023-12-04 DIAGNOSIS — I10 PRIMARY HYPERTENSION: ICD-10-CM

## 2023-12-04 DIAGNOSIS — M51.16 LUMBAR DISC DISEASE WITH RADICULOPATHY: ICD-10-CM

## 2023-12-04 DIAGNOSIS — Z00.00 WELL ADULT EXAM: ICD-10-CM

## 2023-12-04 DIAGNOSIS — F41.9 ANXIETY: ICD-10-CM

## 2023-12-04 PROCEDURE — 99214 OFFICE O/P EST MOD 30 MIN: CPT | Performed by: FAMILY MEDICINE

## 2023-12-04 PROCEDURE — 99396 PREV VISIT EST AGE 40-64: CPT | Performed by: FAMILY MEDICINE

## 2023-12-04 RX ORDER — CLONAZEPAM 2 MG/1
2 TABLET ORAL 3 TIMES DAILY
Qty: 90 TABLET | Refills: 2 | Status: SHIPPED | OUTPATIENT
Start: 2023-12-04

## 2023-12-04 NOTE — PROGRESS NOTES
Assessment/Plan: Vaccines reviewed. Patient up-to-date with vaccines. Patient did have flu shot for the year. Labs reviewed. A1c 5.2. LDL 93. TSH normal as well as CMP CBC. Patient will do Cologuard for colorectal screening. No early family history of prostate cancer. Patient will continue with current regimen for diabetes hypertension hyperlipidemia etc.  Lumbar radiculopathy relatively stable. Refills given at this time. Patient have labs prior to next visit in 3 months. Diagnoses and all orders for this visit:    Type 2 diabetes mellitus without complication, without long-term current use of insulin (HCC)  -     POCT hemoglobin A1c  -     Comprehensive metabolic panel; Future  -     Hemoglobin A1C; Future  -     CBC and differential; Future  -     Lipid panel; Future    Anxiety  -     clonazePAM (KlonoPIN) 2 mg tablet; Take 1 tablet (2 mg total) by mouth 3 (three) times a day    Lumbar disc disease with radiculopathy    Primary hypertension  -     Comprehensive metabolic panel; Future  -     Hemoglobin A1C; Future  -     CBC and differential; Future  -     Lipid panel; Future    Hypercholesterolemia  -     Comprehensive metabolic panel; Future  -     Hemoglobin A1C; Future  -     CBC and differential; Future  -     Lipid panel; Future    Well adult exam            Subjective:        Patient ID: Juno Cortes is a 51 y.o. male. Patient is here to follow-up on diabetes hypertension hyperlipidemia with history of lumbar radiculopathy. No significant radicular symptoms at this time. Back pain feeling much better overall. Patient does see pain management for injections. No other new complaints in this regard. Patient also here for wellness exam.  Labs and vaccines reviewed. No early family history of prostate cancer.           The following portions of the patient's history were reviewed and updated as appropriate: allergies, current medications, past family history, past medical history, past social history, past surgical history and problem list.      Review of Systems   Constitutional: Negative. HENT: Negative. Eyes: Negative. Respiratory: Negative. Cardiovascular: Negative. Gastrointestinal: Negative. Endocrine: Negative. Genitourinary: Negative. Musculoskeletal: Negative. Skin: Negative. Allergic/Immunologic: Negative. Neurological: Negative. Hematological: Negative. Psychiatric/Behavioral: Negative. Objective:      BMI Counseling: Body mass index is 27.53 kg/m². The BMI is above normal. Nutrition recommendations include encouraging healthy choices of fruits and vegetables. Exercise recommendations include moderate physical activity 150 minutes/week. Rationale for BMI follow-up plan is due to patient being overweight or obese. Depression Screening and Follow-up Plan: Clincally patient does not have depression. No treatment is required. /80 (BP Location: Right arm, Patient Position: Sitting, Cuff Size: Standard)   Pulse 84   Temp (!) 96.3 °F (35.7 °C) (Tympanic)   Ht 6' (1.829 m)   Wt 92.1 kg (203 lb)   SpO2 98%   BMI 27.53 kg/m²          Physical Exam  Vitals and nursing note reviewed. Constitutional:       General: He is not in acute distress. Appearance: Normal appearance. He is not ill-appearing, toxic-appearing or diaphoretic. HENT:      Head: Normocephalic and atraumatic. Right Ear: Tympanic membrane, ear canal and external ear normal. There is no impacted cerumen. Left Ear: Tympanic membrane, ear canal and external ear normal. There is no impacted cerumen. Nose: Nose normal. No congestion or rhinorrhea. Mouth/Throat:      Mouth: Mucous membranes are moist.      Pharynx: No oropharyngeal exudate or posterior oropharyngeal erythema. Eyes:      General: No scleral icterus. Right eye: No discharge. Left eye: No discharge. Neck:      Vascular: No carotid bruit. Cardiovascular:      Rate and Rhythm: Normal rate and regular rhythm. Pulses: Normal pulses. Heart sounds: Normal heart sounds. No murmur heard. No friction rub. No gallop. Pulmonary:      Effort: Pulmonary effort is normal. No respiratory distress. Breath sounds: Normal breath sounds. No stridor. No wheezing, rhonchi or rales. Chest:      Chest wall: No tenderness. Musculoskeletal:         General: No swelling, tenderness, deformity or signs of injury. Normal range of motion. Cervical back: Normal range of motion and neck supple. No rigidity. No muscular tenderness. Right lower leg: No edema. Left lower leg: No edema. Lymphadenopathy:      Cervical: No cervical adenopathy. Skin:     General: Skin is warm and dry. Capillary Refill: Capillary refill takes less than 2 seconds. Coloration: Skin is not jaundiced. Findings: No bruising, erythema, lesion or rash. Neurological:      Mental Status: He is alert and oriented to person, place, and time. Mental status is at baseline. Cranial Nerves: No cranial nerve deficit. Sensory: No sensory deficit. Motor: No weakness. Coordination: Coordination normal.      Gait: Gait normal.   Psychiatric:         Mood and Affect: Mood normal.         Behavior: Behavior normal.         Thought Content:  Thought content normal.         Judgment: Judgment normal.

## 2024-01-29 ENCOUNTER — TELEPHONE (OUTPATIENT)
Age: 49
End: 2024-01-29

## 2024-01-29 NOTE — TELEPHONE ENCOUNTER
S/w pt, pt would like to repeat Left hip injection last performed 11/6/23.  Pt reports 100% relief x 2.5 months.  The same pain returned 5 days ago and is currently 4-5/10.  Nurse advised pt nurse to discuss with KW, once okay to repeat, SPA procedure  to call and schedule.  Pt verbalized understanding and appreciative of call.    Please advise, TY.

## 2024-01-29 NOTE — TELEPHONE ENCOUNTER
Caller: patient    Doctor: tashia    Reason for call: would like to schedule another procedure, same place as the last time hip to buttock    Call back#:

## 2024-02-02 PROBLEM — Z00.00 WELL ADULT EXAM: Status: RESOLVED | Noted: 2023-12-04 | Resolved: 2024-02-02

## 2024-02-02 NOTE — TELEPHONE ENCOUNTER
Caller: jhonatan Gonzalez    Doctor: Atilio    Reason for call: pt called asking to speak with Yovany    Call back#: call transferred

## 2024-02-12 ENCOUNTER — HOSPITAL ENCOUNTER (OUTPATIENT)
Dept: RADIOLOGY | Facility: CLINIC | Age: 49
Discharge: HOME | End: 2024-02-12
Admitting: PHYSICAL MEDICINE & REHABILITATION
Payer: COMMERCIAL

## 2024-02-12 VITALS
OXYGEN SATURATION: 98 % | TEMPERATURE: 98.2 F | DIASTOLIC BLOOD PRESSURE: 77 MMHG | SYSTOLIC BLOOD PRESSURE: 129 MMHG | HEART RATE: 74 BPM | RESPIRATION RATE: 18 BRPM

## 2024-02-12 DIAGNOSIS — M25.552 PAIN IN LEFT HIP: ICD-10-CM

## 2024-02-12 PROCEDURE — 77002 NEEDLE LOCALIZATION BY XRAY: CPT | Performed by: PHYSICAL MEDICINE & REHABILITATION

## 2024-02-12 PROCEDURE — 20610 DRAIN/INJ JOINT/BURSA W/O US: CPT | Performed by: PHYSICAL MEDICINE & REHABILITATION

## 2024-02-12 RX ORDER — METHYLPREDNISOLONE ACETATE 80 MG/ML
80 INJECTION, SUSPENSION INTRA-ARTICULAR; INTRALESIONAL; INTRAMUSCULAR; PARENTERAL; SOFT TISSUE ONCE
Status: COMPLETED | OUTPATIENT
Start: 2024-02-12 | End: 2024-02-12

## 2024-02-12 RX ORDER — ROPIVACAINE HYDROCHLORIDE 2 MG/ML
5 INJECTION, SOLUTION EPIDURAL; INFILTRATION; PERINEURAL ONCE
Status: COMPLETED | OUTPATIENT
Start: 2024-02-12 | End: 2024-02-12

## 2024-02-12 RX ORDER — 0.9 % SODIUM CHLORIDE 0.9 %
10 VIAL (ML) INJECTION ONCE
Status: COMPLETED | OUTPATIENT
Start: 2024-02-12 | End: 2024-02-12

## 2024-02-12 RX ADMIN — Medication 2 ML: at 11:13

## 2024-02-12 RX ADMIN — SODIUM CHLORIDE 2 ML: 9 INJECTION, SOLUTION INTRAMUSCULAR; INTRAVENOUS; SUBCUTANEOUS at 11:13

## 2024-02-12 RX ADMIN — IOHEXOL 1 ML: 300 INJECTION, SOLUTION INTRAVENOUS at 11:14

## 2024-02-12 RX ADMIN — METHYLPREDNISOLONE ACETATE 80 MG: 80 INJECTION, SUSPENSION INTRA-ARTICULAR; INTRALESIONAL; INTRAMUSCULAR; PARENTERAL; SOFT TISSUE at 11:14

## 2024-02-12 RX ADMIN — ROPIVACAINE HYDROCHLORIDE 3 ML: 2 INJECTION, SOLUTION EPIDURAL; INFILTRATION; PERINEURAL at 11:14

## 2024-02-12 NOTE — DISCHARGE INSTR - LAB
Do not apply heat to any area that is numb. If you have discomfort or soreness at the injection site, you may apply ice today, 20 minutes on and 20 minutes off. Tomorrow you may use ice or warm, moist heat. Do not apply ice or heat directly to the skin.  If you experience severe shortness of breath, go to the Emergency Room.  You may have numbness for several hours from the local anesthetic. Please use caution and common sense, especially with weight-bearing activities.  You may have an increase or change in the discomfort for 36-48 hours after your treatment. Apply ice and continue with any pain medicine you have been prescribed.  Do not do anything strenuous today. You may shower, but no tub baths or hot tubs today. You may resume your normal activities tomorrow, but do not “overdo it”. Resume normal activities slowly when you are feeling better.  If you experience redness, drainage or swelling at the injection site, or if you develop a fever above 100 degrees, please call [facility] at [phone] or go to the Emergency Room.  Continue to take all routine medicines prescribed by your primary care physician unless otherwise instructed by our staff. Most blood thinners should be started again according to your regularly scheduled dosing. If you have any questions, please give our office a call.    As no general anesthesia was used in today's procedure, you should not experience any side effects related to anesthesia.       If you have a problem specifically related to your procedure, please call our office at [phone].  Problems not related to your procedure should be directed to your primary care physician.

## 2024-02-12 NOTE — H&P
History of Present Illness: The patient is a 49 y.o. male who presents with complaints of hip pain                              Past Medical History:   Diagnosis Date    Anxiety     Chronic pain disorder     knees    Erectile dysfunction     Hyperlipidemia     denies    Hypertension     Impaired fasting blood sugar     Lumbar radiculitis     Right shoulder pain     resolved.....1/18/18    Vitamin D deficiency        Past Surgical History:   Procedure Laterality Date    ABDOMINAL MASS RESECTION      excision of intra       resolved....2011    INGUINAL HERNIA REPAIR      resolved....2004    LIPOMA RESECTION      OK EXC CYST/ABERRANT BREAST TISSUE OPEN 1/> LESION Left 4/19/2019    Procedure: EXCISION BREAST MASS;  Surgeon: Anibal Ortega DO;  Location:  MAIN OR;  Service: General    OK EXCISION TUMOR SOFT TISSUE BACK/FLANK SUBQ <3CM Left 4/19/2019    Procedure: EXCISION FLANK MASS;  Surgeon: Anibal Ortega DO;  Location:  MAIN OR;  Service: General         Current Outpatient Medications:     clonazePAM (KlonoPIN) 2 mg tablet, Take 1 tablet (2 mg total) by mouth 3 (three) times a day, Disp: 90 tablet, Rfl: 2    lisinopril (ZESTRIL) 5 mg tablet, Take 1 tablet (5 mg total) by mouth daily, Disp: 90 tablet, Rfl: 3    mometasone (ELOCON) 0.1 % cream, Apply topically daily, Disp: 45 g, Rfl: 2    Multiple Vitamins-Minerals (VISION-JONAS PRESERVE PO), Take by mouth, Disp: , Rfl:     rosuvastatin (CRESTOR) 5 mg tablet, TAKE 1 TABLET (5 MG TOTAL) BY MOUTH DAILY., Disp: 90 tablet, Rfl: 1    Vardenafil HCl 10 MG TBDP, 1 tablet daily as needed., Disp: 4 tablet, Rfl: 11    Current Facility-Administered Medications:     iohexol (OMNIPAQUE) 300 mg/mL injection 1 mL, 1 mL, Intra-articular, Once, Claus Trimble DO    lidocaine (PF) (XYLOCAINE-MPF) 2 % injection 2 mL, 2 mL, Infiltration, Once, Claus Trimble DO    methylPREDNISolone acetate (DEPO-MEDROL) injection 80 mg, 80 mg, Intra-articular, Once, Claus Trimble DO     ropivacaine (NAROPIN) injection 5 mL, 5 mL, Intra-articular, Once, Claus Trimble,     sodium chloride (PF) 0.9 % injection 10 mL, 10 mL, Infiltration, Once, Claus Trimble,     Allergies   Allergen Reactions    Amoxicillin Hives    Penicillins Hives       Physical Exam:   Vitals:    02/12/24 1040   BP: 116/73   Pulse: 74   Resp: 18   Temp: 98.2 °F (36.8 °C)   SpO2: 98%     General: Awake, Alert, Oriented x 3, Mood and affect appropriate  Respiratory: Respirations even and unlabored  Cardiovascular: Peripheral pulses intact; no edema  Musculoskeletal Exam: Tenderness palpation left lateral hip    ASA Score: 2    Patient/Chart Verification  Patient ID Verified: Verbal  ID Band Applied: No  Consents Confirmed: Procedural, To be obtained in the Pre-Procedure area  H&P( within 30 days) Verified: To be obtained in the Pre-Procedure area  Allergies Reviewed: Yes  Anticoag/NSAID held?: No  Currently on antibiotics?: No    Assessment:   1. Pain in left hip        Plan: Left hip injection

## 2024-02-21 PROBLEM — J11.1 INFLUENZA: Status: RESOLVED | Noted: 2018-11-27 | Resolved: 2024-02-21

## 2024-02-21 PROBLEM — J01.00 ACUTE NON-RECURRENT MAXILLARY SINUSITIS: Status: RESOLVED | Noted: 2019-01-21 | Resolved: 2024-02-21

## 2024-02-23 ENCOUNTER — TELEPHONE (OUTPATIENT)
Dept: PAIN MEDICINE | Facility: CLINIC | Age: 49
End: 2024-02-23

## 2024-02-26 LAB — HBA1C MFR BLD HPLC: 5.6 %

## 2024-03-11 ENCOUNTER — OFFICE VISIT (OUTPATIENT)
Dept: FAMILY MEDICINE CLINIC | Facility: CLINIC | Age: 49
End: 2024-03-11
Payer: COMMERCIAL

## 2024-03-11 VITALS
HEART RATE: 80 BPM | HEIGHT: 72 IN | DIASTOLIC BLOOD PRESSURE: 80 MMHG | BODY MASS INDEX: 28.01 KG/M2 | WEIGHT: 206.8 LBS | TEMPERATURE: 98.2 F | SYSTOLIC BLOOD PRESSURE: 126 MMHG | OXYGEN SATURATION: 98 %

## 2024-03-11 DIAGNOSIS — F41.1 GENERALIZED ANXIETY DISORDER: ICD-10-CM

## 2024-03-11 DIAGNOSIS — E11.9 TYPE 2 DIABETES MELLITUS WITHOUT COMPLICATION, WITHOUT LONG-TERM CURRENT USE OF INSULIN (HCC): ICD-10-CM

## 2024-03-11 DIAGNOSIS — R22.40 SUBCUTANEOUS NODULE OF LOWER EXTREMITY: ICD-10-CM

## 2024-03-11 DIAGNOSIS — E78.00 HYPERCHOLESTEROLEMIA: ICD-10-CM

## 2024-03-11 DIAGNOSIS — M62.89 MUSCLE HERNIATION: ICD-10-CM

## 2024-03-11 DIAGNOSIS — I10 PRIMARY HYPERTENSION: Primary | ICD-10-CM

## 2024-03-11 DIAGNOSIS — F41.9 ANXIETY: ICD-10-CM

## 2024-03-11 DIAGNOSIS — I10 ESSENTIAL HYPERTENSION: ICD-10-CM

## 2024-03-11 PROCEDURE — 99214 OFFICE O/P EST MOD 30 MIN: CPT | Performed by: FAMILY MEDICINE

## 2024-03-11 RX ORDER — CLONAZEPAM 2 MG/1
2 TABLET ORAL 3 TIMES DAILY
Qty: 90 TABLET | Refills: 2 | Status: SHIPPED | OUTPATIENT
Start: 2024-03-11

## 2024-03-11 RX ORDER — LISINOPRIL 5 MG/1
5 TABLET ORAL DAILY
Qty: 90 TABLET | Refills: 1 | Status: SHIPPED | OUTPATIENT
Start: 2024-03-11

## 2024-03-11 NOTE — PROGRESS NOTES
Assessment/Plan: CMP CBC reviewed.  LDL 84 A1c 5.6.  Patient diet controlled diabetic.  Continue with appropriate modification.  Patient will use lisinopril 5 mg daily for hypertension.  Refills given.  Patient will continue with clonazepam for anxiety.  Refills given.  Patient will continue with Crestor for hyperlipidemic state.  Other guidance given at this time.  Follow-up in 3 months or as needed.  Patient will go for laboratory studies prior to next visit.       Diagnoses and all orders for this visit:    Primary hypertension  -     Hemoglobin A1C; Future  -     Albumin / creatinine urine ratio; Future  -     CBC and differential; Future  -     Comprehensive metabolic panel; Future  -     Lipid panel; Future    Anxiety  -     clonazePAM (KlonoPIN) 2 mg tablet; Take 1 tablet (2 mg total) by mouth 3 (three) times a day    Type 2 diabetes mellitus without complication, without long-term current use of insulin (HCC)  -     Hemoglobin A1C; Future  -     Albumin / creatinine urine ratio; Future  -     CBC and differential; Future  -     Comprehensive metabolic panel; Future  -     Lipid panel; Future    Hypercholesterolemia  -     Hemoglobin A1C; Future  -     Albumin / creatinine urine ratio; Future  -     CBC and differential; Future  -     Comprehensive metabolic panel; Future  -     Lipid panel; Future    Subcutaneous nodule of lower extremity    Generalized anxiety disorder    Muscle herniation  -     Ambulatory Referral to Orthopedic Surgery; Future    Essential hypertension  -     lisinopril (ZESTRIL) 5 mg tablet; Take 1 tablet (5 mg total) by mouth daily            Subjective:        Patient ID: Kwame tSallings is a 49 y.o. male.      Patient here to follow-up on hypertension hyperlipidemia with history of diabetes which is diet-controlled as well as anxiety.  Patient will need refills.  Patient with ongoing muscle herniation left leg with some pain intermittently.  Patient wishes to see specialist.  Patient is  seeing podiatry for injections.  Back pain stable.  No new chest pain shortness of breath or problems urinating or defecating.    Leg Pain           The following portions of the patient's history were reviewed and updated as appropriate: allergies, current medications, past family history, past medical history, past social history, past surgical history and problem list.      Review of Systems   Constitutional: Negative.    HENT: Negative.     Eyes: Negative.    Respiratory: Negative.     Cardiovascular: Negative.    Gastrointestinal: Negative.    Endocrine: Negative.    Genitourinary: Negative.    Musculoskeletal: Negative.    Skin: Negative.    Allergic/Immunologic: Negative.    Neurological: Negative.    Hematological: Negative.    Psychiatric/Behavioral: Negative.             Objective:        Depression Screening and Follow-up Plan: Patient was screened for depression during today's encounter. They screened negative with a PHQ-2 score of 0.            /80 (BP Location: Right arm, Patient Position: Sitting, Cuff Size: Standard)   Pulse 80   Temp 98.2 °F (36.8 °C) (Temporal)   Ht 6' (1.829 m)   Wt 93.8 kg (206 lb 12.8 oz)   SpO2 98%   BMI 28.05 kg/m²          Physical Exam  Vitals and nursing note reviewed.   Constitutional:       General: He is not in acute distress.     Appearance: Normal appearance. He is not ill-appearing, toxic-appearing or diaphoretic.   HENT:      Head: Normocephalic and atraumatic.      Right Ear: Tympanic membrane, ear canal and external ear normal. There is no impacted cerumen.      Left Ear: Tympanic membrane, ear canal and external ear normal. There is no impacted cerumen.      Nose: Nose normal. No congestion or rhinorrhea.      Mouth/Throat:      Mouth: Mucous membranes are moist.      Pharynx: No oropharyngeal exudate or posterior oropharyngeal erythema.   Eyes:      General: No scleral icterus.        Right eye: No discharge.         Left eye: No discharge.   Neck:       Vascular: No carotid bruit.   Cardiovascular:      Rate and Rhythm: Normal rate and regular rhythm.      Pulses: Normal pulses.      Heart sounds: Normal heart sounds. No murmur heard.     No friction rub. No gallop.   Pulmonary:      Effort: Pulmonary effort is normal. No respiratory distress.      Breath sounds: Normal breath sounds. No stridor. No wheezing, rhonchi or rales.   Chest:      Chest wall: No tenderness.   Musculoskeletal:         General: No swelling, tenderness, deformity or signs of injury. Normal range of motion.      Cervical back: Normal range of motion and neck supple. No rigidity. No muscular tenderness.      Right lower leg: No edema.      Left lower leg: No edema.   Lymphadenopathy:      Cervical: No cervical adenopathy.   Skin:     General: Skin is warm and dry.      Capillary Refill: Capillary refill takes less than 2 seconds.      Coloration: Skin is not jaundiced.      Findings: No bruising, erythema, lesion or rash.   Neurological:      Mental Status: He is alert and oriented to person, place, and time. Mental status is at baseline.      Cranial Nerves: No cranial nerve deficit.      Sensory: No sensory deficit.      Motor: No weakness.      Coordination: Coordination normal.      Gait: Gait normal.   Psychiatric:         Mood and Affect: Mood normal.         Behavior: Behavior normal.         Thought Content: Thought content normal.         Judgment: Judgment normal.

## 2024-03-18 ENCOUNTER — OFFICE VISIT (OUTPATIENT)
Dept: OBGYN CLINIC | Facility: MEDICAL CENTER | Age: 49
End: 2024-03-18
Payer: COMMERCIAL

## 2024-03-18 VITALS — WEIGHT: 206 LBS | BODY MASS INDEX: 27.9 KG/M2 | HEIGHT: 72 IN

## 2024-03-18 DIAGNOSIS — M62.89: Primary | ICD-10-CM

## 2024-03-18 DIAGNOSIS — M62.89 MUSCLE HERNIATION: ICD-10-CM

## 2024-03-18 PROCEDURE — 99243 OFF/OP CNSLTJ NEW/EST LOW 30: CPT | Performed by: EMERGENCY MEDICINE

## 2024-03-18 NOTE — PROGRESS NOTES
Assessment/Plan:    Diagnoses and all orders for this visit:    Muscle sheath hernia    Muscle herniation  -     Ambulatory Referral to Orthopedic Surgery    Discussed treatment options with patient and at this point he has elected to proceed with conservative care.  We did discuss signs and symptoms including increased pain or size, or pain symptoms at last more than 15 to 20 minutes he should contact the office immediately or go to the emergency department however this is very unlikely.  He may try a compression sleeve.  We did discuss that if he decides he would like to have repair of the hernia performed for aesthetics we would refer to Dr. Meng.        Return if symptoms worsen or fail to improve.      Subjective:   Patient ID: Kwame Stallings is a 49 y.o. male.    New patient presents referred by PCP Dr. Parnell for muscle sheath herniation of the left lateral shin ongoing for a about a year which was confirmed on U/S last year.  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  However the symptoms only last 5 to 10 minutes and then resolved.  These episodes do not occur often or regularly, and he is able to perform his other work duties as well as run around and play with his pet dog with no issues.        Review of Systems    The following portions of the patient's chart were reviewed and updated as appropriate:   Allergy:    Allergies   Allergen Reactions    Amoxicillin Hives    Penicillins Hives       Medications:    Current Outpatient Medications:     clonazePAM (KlonoPIN) 2 mg tablet, Take 1 tablet (2 mg total) by mouth 3 (three) times a day, Disp: 90 tablet, Rfl: 2    lisinopril (ZESTRIL) 5 mg tablet, Take 1 tablet (5 mg total) by mouth daily, Disp: 90 tablet, Rfl: 1    Multiple Vitamins-Minerals (VISION-JONAS PRESERVE PO), Take by mouth, Disp: , Rfl:     rosuvastatin (CRESTOR) 5 mg tablet, TAKE 1 TABLET (5 MG TOTAL)  BY MOUTH DAILY., Disp: 90 tablet, Rfl: 1    Vardenafil HCl 10 MG TBDP, 1 tablet daily as needed., Disp: 4 tablet, Rfl: 11    Patient Active Problem List   Diagnosis    Erectile dysfunction of non-organic origin    Esophageal reflux    Generalized anxiety disorder    Hemorrhoid    Hypercholesterolemia    Hypertension    Vitamin D deficiency    Chronic pain of left knee    Insect sting    Lumbar radiculitis    Lipoma of breast    Elevated blood pressure reading    Subcutaneous nodule    Poison ivy    Bee sting    Cellulitis of right lower extremity    Raynaud's disease without gangrene    Type 2 diabetes mellitus without complication, without long-term current use of insulin ([facility])    Lumbar strain, initial encounter    Slow transit constipation    Dermatitis    Subcutaneous nodule of lower extremity    Lumbar disc herniation    Lumbar disc disease with radiculopathy    Arthritis of left hip       Objective:  Ht 6' (1.829 m)   Wt 93.4 kg (206 lb)   BMI 27.94 kg/m²     Left Ankle Exam     Other   Erythema: absent  Sensation: normal  Pulse: present    Comments:  On the distal aspect of the lateral compartment there is a approximately nickel sized round to oval soft tissue lump which is nontender to palpation coinciding with muscle sheath herniation.  Patient has full active range of motion of the ankle with full strength with no pain.  There is no overlying erythema.            Physical Exam      Neurologic Exam    Procedures    I have personally reviewed the written report of the pertinent studies.   Soft Tissue U/S from 2023  Narrative & Impression   Left lateral lower extremity ULTRASOUND     INDICATION:   R22.40: Localized swelling, mass and lump, unspecified lower limb.     COMPARISON:  None     TECHNIQUE:   Real-time ultrasound of the left thigh was performed with a linear transducer with both volumetric sweeps and still imaging techniques.     FINDINGS:  There appears to be a tiny focal defect along the lateral  aspect of the left lower extremity best seen on series 1 image 32 concerning for tiny muscle herniation. On the right, the rectus femoris is unremarkable.       IMPRESSION:  Suspected tiny muscle herniation left lateral lower extremity best seen on series 1 image 32. This may be confirmed by MRI if clinically necessary.               Past Medical History:   Diagnosis Date    Anxiety     Chronic pain disorder     knees    Erectile dysfunction     Hyperlipidemia     denies    Hypertension     Impaired fasting blood sugar     Lumbar radiculitis     Right shoulder pain     resolved.....1/18/18    Vitamin D deficiency        Past Surgical History:   Procedure Laterality Date    ABDOMINAL MASS RESECTION      excision of intra       resolved....2011    INGUINAL HERNIA REPAIR      resolved....2004    LIPOMA RESECTION      MT EXC CYST/ABERRANT BREAST TISSUE OPEN 1/> LESION Left 4/19/2019    Procedure: EXCISION BREAST MASS;  Surgeon: Anibal Ortega DO;  Location:  MAIN OR;  Service: General    MT EXCISION TUMOR SOFT TISSUE BACK/FLANK SUBQ <3CM Left 4/19/2019    Procedure: EXCISION FLANK MASS;  Surgeon: Anibal Ortega DO;  Location:  MAIN OR;  Service: General       Social History     Socioeconomic History    Marital status: /Civil Union     Spouse name: Not on file    Number of children: Not on file    Years of education: Not on file    Highest education level: Not on file   Occupational History    Occupation: construction     Tobacco Use    Smoking status: Never    Smokeless tobacco: Never   Vaping Use    Vaping status: Never Used   Substance and Sexual Activity    Alcohol use: Not Currently     Comment: social     Drug use: No    Sexual activity: Yes     Partners: Female   Other Topics Concern    Not on file   Social History Narrative    Not on file     Social Determinants of Health     Financial Resource Strain: Not on file   Food Insecurity: Not on file   Transportation Needs: Not on file   Physical Activity: Not  on file   Stress: Not on file   Social Connections: Not on file   Intimate Partner Violence: Not on file   Housing Stability: Not on file       Family History   Problem Relation Age of Onset    Hypertension Mother     Diabetes type II Mother     Hypertension Father

## 2024-06-03 LAB
ALBUMIN SERPL-MCNC: 4.6 G/DL (ref 3.5–5.7)
ALP SERPL-CCNC: 53 U/L (ref 35–120)
ALT SERPL-CCNC: 31 U/L
ANION GAP SERPL CALCULATED.3IONS-SCNC: 8 MMOL/L (ref 3–11)
AST SERPL-CCNC: 28 U/L
BASOPHILS # BLD AUTO: 0 THOU/CMM (ref 0–0.1)
BASOPHILS NFR BLD AUTO: 1 %
BILIRUB SERPL-MCNC: 0.9 MG/DL (ref 0.2–1)
BUN SERPL-MCNC: 19 MG/DL (ref 7–28)
CALCIUM SERPL-MCNC: 9.5 MG/DL (ref 8.5–10.1)
CHLORIDE SERPL-SCNC: 102 MMOL/L (ref 100–109)
CHOLEST SERPL-MCNC: 172 MG/DL
CHOLEST/HDLC SERPL: 3.4 {RATIO}
CO2 SERPL-SCNC: 29 MMOL/L (ref 21–31)
CREAT SERPL-MCNC: 0.77 MG/DL (ref 0.53–1.3)
CYTOLOGY CMNT CVX/VAG CYTO-IMP: NORMAL
DIFFERENTIAL METHOD BLD: NORMAL
EOSINOPHIL # BLD AUTO: 0.3 THOU/CMM (ref 0–0.5)
EOSINOPHIL NFR BLD AUTO: 4 %
ERYTHROCYTE [DISTWIDTH] IN BLOOD BY AUTOMATED COUNT: 12.6 % (ref 12–16)
EST. AVERAGE GLUCOSE BLD GHB EST-MCNC: 105 MG/DL
GFR/BSA.PRED SERPLBLD CYS-BASED-ARV: 109 ML/MIN/{1.73_M2}
GLUCOSE SERPL-MCNC: 80 MG/DL (ref 65–99)
HBA1C MFR BLD: 5.3 %
HCT VFR BLD AUTO: 45.9 % (ref 37–48)
HDLC SERPL-MCNC: 51 MG/DL (ref 23–92)
HGB BLD-MCNC: 15.4 G/DL (ref 12.5–17)
LDLC SERPL CALC-MCNC: 108 MG/DL
LYMPHOCYTES # BLD AUTO: 1.9 THOU/CMM (ref 1–3)
LYMPHOCYTES NFR BLD AUTO: 29 %
MCH RBC QN AUTO: 29.3 PG (ref 27–36)
MCHC RBC AUTO-ENTMCNC: 33.6 G/DL (ref 32–37)
MCV RBC AUTO: 87 FL (ref 80–100)
MONOCYTES # BLD AUTO: 0.5 THOU/CMM (ref 0.3–1)
MONOCYTES NFR BLD AUTO: 8 %
NEUTROPHILS # BLD AUTO: 3.9 THOU/CMM (ref 1.8–7.8)
NEUTROPHILS NFR BLD AUTO: 58 %
NONHDLC SERPL-MCNC: 121 MG/DL
PLATELET # BLD AUTO: 182 THOU/CMM (ref 140–350)
PMV BLD REES-ECKER: 7.9 FL (ref 7.5–11.3)
POTASSIUM SERPL-SCNC: 5 MMOL/L (ref 3.5–5.2)
PROT SERPL-MCNC: 7 G/DL (ref 6.3–8.3)
RBC # BLD AUTO: 5.28 MILL/CMM (ref 4–5.4)
SODIUM SERPL-SCNC: 139 MMOL/L (ref 135–145)
TRIGL SERPL-MCNC: 66 MG/DL
WBC # BLD AUTO: 6.6 THOU/CMM (ref 4–10.5)

## 2024-06-17 ENCOUNTER — OFFICE VISIT (OUTPATIENT)
Dept: FAMILY MEDICINE CLINIC | Facility: CLINIC | Age: 49
End: 2024-06-17
Payer: COMMERCIAL

## 2024-06-17 VITALS
BODY MASS INDEX: 27.09 KG/M2 | DIASTOLIC BLOOD PRESSURE: 64 MMHG | SYSTOLIC BLOOD PRESSURE: 123 MMHG | HEART RATE: 79 BPM | HEIGHT: 72 IN | TEMPERATURE: 97.8 F | OXYGEN SATURATION: 98 % | WEIGHT: 200 LBS

## 2024-06-17 DIAGNOSIS — I10 PRIMARY HYPERTENSION: ICD-10-CM

## 2024-06-17 DIAGNOSIS — R41.89 COGNITIVE IMPAIRMENT: ICD-10-CM

## 2024-06-17 DIAGNOSIS — E11.9 TYPE 2 DIABETES MELLITUS WITHOUT COMPLICATION, WITHOUT LONG-TERM CURRENT USE OF INSULIN (HCC): Primary | ICD-10-CM

## 2024-06-17 DIAGNOSIS — K21.9 GASTROESOPHAGEAL REFLUX DISEASE WITHOUT ESOPHAGITIS: ICD-10-CM

## 2024-06-17 DIAGNOSIS — E78.00 HYPERCHOLESTEROLEMIA: ICD-10-CM

## 2024-06-17 DIAGNOSIS — F41.1 GENERALIZED ANXIETY DISORDER: ICD-10-CM

## 2024-06-17 PROCEDURE — 99214 OFFICE O/P EST MOD 30 MIN: CPT | Performed by: FAMILY MEDICINE

## 2024-06-17 RX ORDER — ROSUVASTATIN CALCIUM 10 MG/1
10 TABLET, COATED ORAL DAILY
Qty: 90 TABLET | Refills: 1 | Status: SHIPPED | OUTPATIENT
Start: 2024-06-17

## 2024-06-17 NOTE — PROGRESS NOTES
Assessment/Plan: .  CMP reviewed.  A1c 5.3.  CBC normal.  Patient will increase Crestor to 10 mg daily.  Continue with other meds for chronic conditions listed below.  Continue with dietary modification regarding diabetes.  Patient will labs for cognitive impairment.  To consider seeing neurology. patient will labs prior to next visit in 3 months.       Diagnoses and all orders for this visit:    Type 2 diabetes mellitus without complication, without long-term current use of insulin (HCC)  -     Hemoglobin A1C; Future  -     Lipid panel; Future  -     TSH, 3rd generation with Free T4 reflex; Future  -     Comprehensive metabolic panel; Future  -     CBC and differential; Future    Primary hypertension  -     Hemoglobin A1C; Future  -     Lipid panel; Future  -     TSH, 3rd generation with Free T4 reflex; Future  -     Comprehensive metabolic panel; Future  -     CBC and differential; Future    Gastroesophageal reflux disease without esophagitis  -     Hemoglobin A1C; Future  -     Lipid panel; Future  -     TSH, 3rd generation with Free T4 reflex; Future  -     Comprehensive metabolic panel; Future  -     CBC and differential; Future    Hypercholesterolemia  -     rosuvastatin (CRESTOR) 10 MG tablet; Take 1 tablet (10 mg total) by mouth daily  -     Hemoglobin A1C; Future  -     Lipid panel; Future  -     TSH, 3rd generation with Free T4 reflex; Future  -     Comprehensive metabolic panel; Future  -     CBC and differential; Future    Generalized anxiety disorder  -     Hemoglobin A1C; Future  -     Lipid panel; Future  -     TSH, 3rd generation with Free T4 reflex; Future  -     Comprehensive metabolic panel; Future  -     CBC and differential; Future    Cognitive impairment  -     Vitamin B12; Future  -     Vitamin D 25 hydroxy; Future  -     [place]yme Total AB W Reflex to IGM/IGG; Future  -     C-reactive protein; Future  -     ADRI w/Reflex if Positive; Future            Subjective:        Patient ID: Kwame HERNANDEZ  Chandrakant is a 49 y.o. male.      Patient here to follow-up on diabetes hypertension hyperlipidemia with history of GERD and anxiety.  Physically patient is doing very well.  No chest pain shortness of breath or problems urinating or defecating than having some loose stools with smoothie concoction.          The following portions of the patient's history were reviewed and updated as appropriate: allergies, current medications, past family history, past medical history, past social history, past surgical history and problem list.      Review of Systems   Constitutional: Negative.  Negative for chills and fever.   HENT: Negative.  Negative for ear pain and sore throat.    Eyes: Negative.  Negative for pain and visual disturbance.   Respiratory: Negative.  Negative for cough and shortness of breath.    Cardiovascular: Negative.  Negative for chest pain and palpitations.   Gastrointestinal: Negative.  Negative for abdominal pain and vomiting.   Endocrine: Negative.    Genitourinary: Negative.  Negative for dysuria and hematuria.   Musculoskeletal: Negative.  Negative for arthralgias and back pain.   Skin: Negative.  Negative for color change and rash.   Allergic/Immunologic: Negative.    Neurological: Negative.  Negative for seizures and syncope.   Hematological: Negative.    Psychiatric/Behavioral: Negative.     All other systems reviewed and are negative.          Objective:               /64 (BP Location: Left arm, Patient Position: Sitting, Cuff Size: Standard)   Pulse 79   Temp 97.8 °F (36.6 °C) (Temporal)   Ht 6' (1.829 m)   Wt 90.7 kg (200 lb)   SpO2 98%   BMI 27.12 kg/m²          Physical Exam  Vitals and nursing note reviewed.   Constitutional:       General: He is not in acute distress.     Appearance: Normal appearance. He is not ill-appearing, toxic-appearing or diaphoretic.   HENT:      Head: Normocephalic and atraumatic.      Right Ear: Tympanic membrane, ear canal and external ear normal. There is  no impacted cerumen.      Left Ear: Tympanic membrane, ear canal and external ear normal. There is no impacted cerumen.      Nose: Nose normal. No congestion or rhinorrhea.      Mouth/Throat:      Mouth: Mucous membranes are moist.      Pharynx: No oropharyngeal exudate or posterior oropharyngeal erythema.   Eyes:      General: No scleral icterus.        Right eye: No discharge.         Left eye: No discharge.   Neck:      Vascular: No carotid bruit.   Cardiovascular:      Rate and Rhythm: Normal rate and regular rhythm.      Pulses: Normal pulses.      Heart sounds: Normal heart sounds. No murmur heard.     No friction rub. No gallop.   Pulmonary:      Effort: Pulmonary effort is normal. No respiratory distress.      Breath sounds: Normal breath sounds. No stridor. No wheezing, rhonchi or rales.   Chest:      Chest wall: No tenderness.   Musculoskeletal:         General: No swelling, tenderness, deformity or signs of injury. Normal range of motion.      Cervical back: Normal range of motion and neck supple. No rigidity. No muscular tenderness.      Right lower leg: No edema.      Left lower leg: No edema.   Lymphadenopathy:      Cervical: No cervical adenopathy.   Skin:     General: Skin is warm and dry.      Capillary Refill: Capillary refill takes less than 2 seconds.      Coloration: Skin is not jaundiced.      Findings: No bruising, erythema, lesion or rash.   Neurological:      Mental Status: He is alert and oriented to person, place, and time. Mental status is at baseline.      Cranial Nerves: No cranial nerve deficit.      Sensory: No sensory deficit.      Motor: No weakness.      Coordination: Coordination normal.      Gait: Gait normal.   Psychiatric:         Mood and Affect: Mood normal.         Behavior: Behavior normal.         Thought Content: Thought content normal.         Judgment: Judgment normal.

## 2024-08-06 LAB
LEFT EYE DIABETIC RETINOPATHY: NORMAL
RIGHT EYE DIABETIC RETINOPATHY: NORMAL

## 2024-08-11 DIAGNOSIS — F52.21 ERECTILE DYSFUNCTION OF NON-ORGANIC ORIGIN: ICD-10-CM

## 2024-08-11 RX ORDER — VARDENAFIL 11.85 MG/1
TABLET ORAL
Qty: 4 TABLET | Refills: 11 | Status: SHIPPED | OUTPATIENT
Start: 2024-08-11

## 2024-08-15 ENCOUNTER — DOCUMENTATION (OUTPATIENT)
Dept: FAMILY MEDICINE CLINIC | Facility: CLINIC | Age: 49
End: 2024-08-15

## 2024-08-15 NOTE — PROGRESS NOTES
Received and filled out PE form from patients' employment; awaiting Dr. Noe' review, signature and date on form.

## 2024-09-09 DIAGNOSIS — F41.9 ANXIETY: ICD-10-CM

## 2024-09-09 DIAGNOSIS — I10 ESSENTIAL HYPERTENSION: ICD-10-CM

## 2024-09-09 RX ORDER — LISINOPRIL 5 MG/1
5 TABLET ORAL DAILY
Qty: 90 TABLET | Refills: 1 | Status: SHIPPED | OUTPATIENT
Start: 2024-09-09

## 2024-09-10 DIAGNOSIS — S39.012A LUMBAR STRAIN, INITIAL ENCOUNTER: ICD-10-CM

## 2024-09-10 RX ORDER — CLONAZEPAM 2 MG/1
2 TABLET ORAL 3 TIMES DAILY
Qty: 90 TABLET | Refills: 0 | Status: SHIPPED | OUTPATIENT
Start: 2024-09-10

## 2024-09-10 RX ORDER — GABAPENTIN 300 MG/1
300 CAPSULE ORAL 3 TIMES DAILY
Qty: 90 CAPSULE | Refills: 0 | Status: SHIPPED | OUTPATIENT
Start: 2024-09-10

## 2024-09-17 ENCOUNTER — OFFICE VISIT (OUTPATIENT)
Dept: FAMILY MEDICINE CLINIC | Facility: CLINIC | Age: 49
End: 2024-09-17
Payer: COMMERCIAL

## 2024-09-17 VITALS
WEIGHT: 197.4 LBS | HEIGHT: 72 IN | SYSTOLIC BLOOD PRESSURE: 122 MMHG | TEMPERATURE: 98.4 F | OXYGEN SATURATION: 98 % | HEART RATE: 86 BPM | DIASTOLIC BLOOD PRESSURE: 80 MMHG | RESPIRATION RATE: 16 BRPM | BODY MASS INDEX: 26.74 KG/M2

## 2024-09-17 DIAGNOSIS — L30.9 DERMATITIS: Primary | ICD-10-CM

## 2024-09-17 LAB
25(OH)D3+25(OH)D2 SERPL-MCNC: 44 NG/ML (ref 30–100)
ALBUMIN SERPL-MCNC: 4.6 G/DL (ref 3.5–5.7)
ALP SERPL-CCNC: 49 U/L (ref 35–120)
ALT SERPL-CCNC: 32 U/L
ANION GAP SERPL CALCULATED.3IONS-SCNC: 10 MMOL/L (ref 3–11)
AST SERPL-CCNC: 23 U/L
BASOPHILS # BLD AUTO: 0 THOU/CMM (ref 0–0.1)
BASOPHILS NFR BLD AUTO: 1 %
BILIRUB SERPL-MCNC: 0.7 MG/DL (ref 0.2–1)
BUN SERPL-MCNC: 14 MG/DL (ref 7–28)
CALCIUM SERPL-MCNC: 9.6 MG/DL (ref 8.5–10.1)
CHLORIDE SERPL-SCNC: 102 MMOL/L (ref 100–109)
CHOLEST SERPL-MCNC: 159 MG/DL
CHOLEST/HDLC SERPL: 3.4 {RATIO}
CO2 SERPL-SCNC: 28 MMOL/L (ref 21–31)
CREAT SERPL-MCNC: 0.76 MG/DL (ref 0.53–1.3)
CRP SERPL-MCNC: 1 MG/L
CYTOLOGY CMNT CVX/VAG CYTO-IMP: NORMAL
DIFFERENTIAL METHOD BLD: NORMAL
EOSINOPHIL # BLD AUTO: 0.2 THOU/CMM (ref 0–0.5)
EOSINOPHIL NFR BLD AUTO: 3 %
ERYTHROCYTE [DISTWIDTH] IN BLOOD BY AUTOMATED COUNT: 12.8 % (ref 12–16)
EST. AVERAGE GLUCOSE BLD GHB EST-MCNC: 111 MG/DL
GFR/BSA.PRED SERPLBLD CYS-BASED-ARV: 109 ML/MIN/{1.73_M2}
GLUCOSE SERPL-MCNC: 92 MG/DL (ref 65–99)
HBA1C MFR BLD: 5.5 %
HCT VFR BLD AUTO: 45.6 % (ref 37–48)
HDLC SERPL-MCNC: 47 MG/DL (ref 23–92)
HGB BLD-MCNC: 15.5 G/DL (ref 12.5–17)
LDLC SERPL CALC-MCNC: 100 MG/DL
LYMPHOCYTES # BLD AUTO: 1.6 THOU/CMM (ref 1–3)
LYMPHOCYTES NFR BLD AUTO: 21 %
MCH RBC QN AUTO: 29.2 PG (ref 27–36)
MCHC RBC AUTO-ENTMCNC: 34 G/DL (ref 32–37)
MCV RBC AUTO: 86 FL (ref 80–100)
MONOCYTES # BLD AUTO: 0.6 THOU/CMM (ref 0.3–1)
MONOCYTES NFR BLD AUTO: 8 %
NEUTROPHILS # BLD AUTO: 5.1 THOU/CMM (ref 1.8–7.8)
NEUTROPHILS NFR BLD AUTO: 67 %
NONHDLC SERPL-MCNC: 112 MG/DL
PLATELET # BLD AUTO: 178 THOU/CMM (ref 140–350)
PMV BLD REES-ECKER: 7.7 FL (ref 7.5–11.3)
POTASSIUM SERPL-SCNC: 4.4 MMOL/L (ref 3.5–5.2)
PROT SERPL-MCNC: 7 G/DL (ref 6.3–8.3)
RBC # BLD AUTO: 5.31 MILL/CMM (ref 4–5.4)
SODIUM SERPL-SCNC: 140 MMOL/L (ref 135–145)
TRIGL SERPL-MCNC: 60 MG/DL
TSH SERPL-ACNC: 4.17 UIU/ML (ref 0.45–5.33)
VIT B12 SERPL-MCNC: 621 PG/ML (ref 180–914)
WBC # BLD AUTO: 7.5 THOU/CMM (ref 4–10.5)

## 2024-09-17 PROCEDURE — 99213 OFFICE O/P EST LOW 20 MIN: CPT | Performed by: FAMILY MEDICINE

## 2024-09-17 RX ORDER — NYSTATIN AND TRIAMCINOLONE ACETONIDE 100000; 1 [USP'U]/G; MG/G
CREAM TOPICAL 2 TIMES DAILY
Qty: 15 G | Refills: 1 | Status: SHIPPED | OUTPATIENT
Start: 2024-09-17

## 2024-09-17 NOTE — PROGRESS NOTES
Assessment/Plan:       Diagnoses and all orders for this visit:    Dermatitis  -     nystatin-triamcinolone (MYCOLOG-II) cream; Apply topically 2 (two) times a day            Subjective:        Patient ID: Kwame Stallings is a 49 y.o. male.      Patient is here with rash on neck for the past day.  Some pruritus noted..  Disease issues made betamethasone with some improvement in symptoms.  No fevers.  No new creams or lotions etc.  No rash elsewhere on the body.  No new foods etc.    Rash          The following portions of the patient's history were reviewed and updated as appropriate: allergies, current medications, past family history, past medical history, past social history, past surgical history and problem list.      Review of Systems   Constitutional: Negative.    HENT: Negative.     Eyes: Negative.    Respiratory: Negative.     Cardiovascular: Negative.    Gastrointestinal: Negative.    Endocrine: Negative.    Genitourinary: Negative.    Musculoskeletal: Negative.    Skin:  Positive for rash.   Allergic/Immunologic: Negative.    Neurological: Negative.    Hematological: Negative.    Psychiatric/Behavioral: Negative.             Objective:               /80 (BP Location: Right arm, Patient Position: Sitting, Cuff Size: Standard)   Pulse 86   Temp 98.4 °F (36.9 °C) (Temporal)   Resp 16   Ht 6' (1.829 m)   Wt 89.5 kg (197 lb 6.4 oz)   SpO2 98%   BMI 26.77 kg/m²          Physical Exam  Vitals and nursing note reviewed.   Constitutional:       General: He is not in acute distress.     Appearance: Normal appearance. He is not ill-appearing, toxic-appearing or diaphoretic.   HENT:      Head: Normocephalic and atraumatic.      Right Ear: Tympanic membrane, ear canal and external ear normal. There is no impacted cerumen.      Left Ear: Tympanic membrane, ear canal and external ear normal. There is no impacted cerumen.      Nose: Nose normal. No congestion or rhinorrhea.      Mouth/Throat:      Mouth:  Mucous membranes are moist.      Pharynx: No oropharyngeal exudate or posterior oropharyngeal erythema.   Eyes:      General: No scleral icterus.        Right eye: No discharge.         Left eye: No discharge.   Cardiovascular:      Rate and Rhythm: Normal rate and regular rhythm.      Pulses: Normal pulses.      Heart sounds: Normal heart sounds. No murmur heard.     No friction rub. No gallop.   Pulmonary:      Effort: Pulmonary effort is normal. No respiratory distress.      Breath sounds: Normal breath sounds. No stridor. No wheezing, rhonchi or rales.   Chest:      Chest wall: No tenderness.   Musculoskeletal:         General: No swelling, tenderness, deformity or signs of injury. Normal range of motion.      Cervical back: Normal range of motion and neck supple. No rigidity. No muscular tenderness.      Right lower leg: No edema.      Left lower leg: No edema.      Comments: Erythematous rash on neck anteriorly.   Lymphadenopathy:      Cervical: No cervical adenopathy.   Skin:     General: Skin is warm and dry.      Capillary Refill: Capillary refill takes less than 2 seconds.      Coloration: Skin is not jaundiced.      Findings: Rash present. No bruising, erythema or lesion.   Neurological:      Mental Status: He is alert and oriented to person, place, and time.      Cranial Nerves: No cranial nerve deficit.      Sensory: No sensory deficit.      Motor: No weakness.      Coordination: Coordination normal.      Gait: Gait normal.   Psychiatric:         Mood and Affect: Mood normal.         Behavior: Behavior normal.         Thought Content: Thought content normal.         Judgment: Judgment normal.

## 2024-09-18 LAB
ANA SER QL IF: NORMAL
B BURGDOR AB SER QL IA: NEGATIVE INDEX
B BURGDOR AB SER-IMP: NORMAL

## 2024-10-01 ENCOUNTER — OFFICE VISIT (OUTPATIENT)
Age: 49
End: 2024-10-01
Payer: COMMERCIAL

## 2024-10-01 VITALS
OXYGEN SATURATION: 98 % | SYSTOLIC BLOOD PRESSURE: 118 MMHG | HEIGHT: 72 IN | BODY MASS INDEX: 26.71 KG/M2 | DIASTOLIC BLOOD PRESSURE: 75 MMHG | WEIGHT: 197.2 LBS | HEART RATE: 80 BPM | TEMPERATURE: 96.8 F

## 2024-10-01 DIAGNOSIS — E78.00 HYPERCHOLESTEROLEMIA: ICD-10-CM

## 2024-10-01 DIAGNOSIS — F41.1 GENERALIZED ANXIETY DISORDER: ICD-10-CM

## 2024-10-01 DIAGNOSIS — E11.9 TYPE 2 DIABETES MELLITUS WITHOUT COMPLICATION, WITHOUT LONG-TERM CURRENT USE OF INSULIN (HCC): Primary | ICD-10-CM

## 2024-10-01 PROCEDURE — 99214 OFFICE O/P EST MOD 30 MIN: CPT | Performed by: FAMILY MEDICINE

## 2024-10-01 NOTE — PROGRESS NOTES
Assessment/Plan: All laboratory studies reviewed from September 17.  Patient will continue with current regimen for conditions listed below.  Patient will continue modify diet regarding diet controlled diabetes mellitus type 2.  LDL improving overall.  Anxiety stable.  Refills will be given needed for these conditions.  Patient will have laboratory studies for these conditions prior to next visit in 3 months.  Patient will see ophthalmologist appropriately.  Monofilament test done at this time which was normal.       Diagnoses and all orders for this visit:    Type 2 diabetes mellitus without complication, without long-term current use of insulin ([facility])  -     Comprehensive metabolic panel; Future  -     Albumin / creatinine urine ratio; Future  -     CBC and differential; Future  -     Hemoglobin A1C; Future  -     Lipid panel; Future  -     TSH, 3rd generation with Free T4 reflex; Future  -     Comprehensive metabolic panel  -     Albumin / creatinine urine ratio  -     CBC and differential  -     Hemoglobin A1C  -     Lipid panel  -     TSH, 3rd generation with Free T4 reflex    Hypercholesterolemia  -     Comprehensive metabolic panel; Future  -     Albumin / creatinine urine ratio; Future  -     CBC and differential; Future  -     Hemoglobin A1C; Future  -     Lipid panel; Future  -     TSH, 3rd generation with Free T4 reflex; Future  -     Comprehensive metabolic panel  -     Albumin / creatinine urine ratio  -     CBC and differential  -     Hemoglobin A1C  -     Lipid panel  -     TSH, 3rd generation with Free T4 reflex    Generalized anxiety disorder  -     Comprehensive metabolic panel; Future  -     Albumin / creatinine urine ratio; Future  -     CBC and differential; Future  -     Hemoglobin A1C; Future  -     Lipid panel; Future  -     TSH, 3rd generation with Free T4 reflex; Future  -     Comprehensive metabolic panel  -     Albumin / creatinine urine ratio  -     CBC and differential  -     Hemoglobin  A1C  -     Lipid panel  -     TSH, 3rd generation with Free T4 reflex            Subjective:        Patient ID: Kwame Stallings is a 49 y.o. male.      Patient is here to follow-up on diabetes hypertension hyperlipidemia.  Patient doing well at this time.  No new complaints.  No chest pain shortness of breath visual disturbance or foot related issues.  No problems with urination or defecation at the present time.  Anxiety has been stable overall.          The following portions of the patient's history were reviewed and updated as appropriate: allergies, current medications, past family history, past medical history, past social history, past surgical history and problem list.      Review of Systems   Constitutional: Negative.    HENT: Negative.     Eyes: Negative.    Respiratory: Negative.     Cardiovascular: Negative.    Gastrointestinal: Negative.    Endocrine: Negative.    Genitourinary: Negative.    Musculoskeletal: Negative.    Skin: Negative.    Allergic/Immunologic: Negative.    Neurological: Negative.    Hematological: Negative.    Psychiatric/Behavioral: Negative.             Objective:        Depression Screening and Follow-up Plan: Patient was screened for depression during today's encounter. They screened negative with a PHQ-2 score of 0.            /75 (BP Location: Left arm, Patient Position: Sitting, Cuff Size: Adult)   Pulse 80   Temp (!) 96.8 °F (36 °C) (Tympanic)   Ht 6' (1.829 m)   Wt 89.4 kg (197 lb 3.2 oz)   SpO2 98%   BMI 26.75 kg/m²          Physical Exam  Vitals and nursing note reviewed.   Constitutional:       General: He is not in acute distress.     Appearance: Normal appearance. He is not ill-appearing, toxic-appearing or diaphoretic.   HENT:      Head: Normocephalic and atraumatic.      Right Ear: Tympanic membrane, ear canal and external ear normal. There is no impacted cerumen.      Left Ear: Tympanic membrane, ear canal and external ear normal. There is no impacted  cerumen.      Nose: Nose normal. No congestion or rhinorrhea.      Mouth/Throat:      Mouth: Mucous membranes are moist.      Pharynx: No oropharyngeal exudate or posterior oropharyngeal erythema.   Eyes:      General: No scleral icterus.        Right eye: No discharge.         Left eye: No discharge.   Neck:      Vascular: No carotid bruit.   Cardiovascular:      Rate and Rhythm: Normal rate and regular rhythm.      Pulses: Normal pulses.      Heart sounds: Normal heart sounds. No murmur heard.     No friction rub. No gallop.   Pulmonary:      Effort: Pulmonary effort is normal. No respiratory distress.      Breath sounds: Normal breath sounds. No stridor. No wheezing, rhonchi or rales.   Chest:      Chest wall: No tenderness.   Musculoskeletal:         General: No swelling, tenderness, deformity or signs of injury. Normal range of motion.      Cervical back: Normal range of motion and neck supple. No rigidity. No muscular tenderness.      Right lower leg: No edema.      Left lower leg: No edema.   Lymphadenopathy:      Cervical: No cervical adenopathy.   Skin:     General: Skin is warm and dry.      Capillary Refill: Capillary refill takes less than 2 seconds.      Coloration: Skin is not jaundiced.      Findings: No bruising, erythema, lesion or rash.   Neurological:      Mental Status: He is alert and oriented to person, place, and time.      Cranial Nerves: No cranial nerve deficit.      Sensory: No sensory deficit.      Motor: No weakness.      Coordination: Coordination normal.      Gait: Gait normal.   Psychiatric:         Mood and Affect: Mood normal.         Behavior: Behavior normal.         Thought Content: Thought content normal.         Judgment: Judgment normal.

## 2024-10-16 ENCOUNTER — OFFICE VISIT (OUTPATIENT)
Age: 49
End: 2024-10-16
Payer: COMMERCIAL

## 2024-10-16 VITALS
SYSTOLIC BLOOD PRESSURE: 142 MMHG | DIASTOLIC BLOOD PRESSURE: 80 MMHG | TEMPERATURE: 97.9 F | OXYGEN SATURATION: 99 % | WEIGHT: 205.6 LBS | HEART RATE: 82 BPM | BODY MASS INDEX: 27.85 KG/M2 | HEIGHT: 72 IN

## 2024-10-16 DIAGNOSIS — J02.9 SORE THROAT: ICD-10-CM

## 2024-10-16 DIAGNOSIS — J06.9 ACUTE URI: Primary | ICD-10-CM

## 2024-10-16 PROCEDURE — 87636 SARSCOV2 & INF A&B AMP PRB: CPT | Performed by: FAMILY MEDICINE

## 2024-10-16 PROCEDURE — 99213 OFFICE O/P EST LOW 20 MIN: CPT | Performed by: FAMILY MEDICINE

## 2024-10-16 RX ORDER — AZITHROMYCIN 250 MG/1
TABLET, FILM COATED ORAL
Qty: 6 TABLET | Refills: 0 | Status: SHIPPED | OUTPATIENT
Start: 2024-10-16 | End: 2024-10-20

## 2024-10-16 NOTE — PROGRESS NOTES
Assessment/Plan: Patient swabbed for COVID and flu.  Supportive care recommended.  Patient use Z-Arvind if not seeing improvement.       Diagnoses and all orders for this visit:    Acute URI  -     azithromycin (ZITHROMAX) 250 mg tablet; Take 2 tablets today then 1 tablet daily x 4 days            Subjective:        Patient ID: Kwame Stallings is a 49 y.o. male.      Patient is here with bilateral ear pain began yesterday.  Patient also with sore throat some nasal congestion.  Minimal cough.  Possible fever noted.  Patient to use Tylenol as well as Motrin.  Last dose of Motrin was this morning.  Vomiting or diarrhea associated with this.  No new cough or cold medications being used.  Patient with some postnasal drip rhinorrhea as well as some nasal congestion..  Mild achiness.    Earache   Associated symptoms include coughing, rhinorrhea and a sore throat.   Sore Throat   Associated symptoms include congestion, coughing and ear pain.         The following portions of the patient's history were reviewed and updated as appropriate: allergies, current medications, past family history, past medical history, past social history, past surgical history and problem list.      Review of Systems   Constitutional:  Positive for fever.   HENT:  Positive for congestion, ear pain, postnasal drip, rhinorrhea and sore throat. Negative for sinus pressure and sinus pain.    Eyes: Negative.    Respiratory:  Positive for cough.    Cardiovascular: Negative.    Gastrointestinal: Negative.    Endocrine: Negative.    Genitourinary: Negative.    Musculoskeletal:  Positive for arthralgias.   Skin: Negative.    Allergic/Immunologic: Negative.    Neurological: Negative.    Hematological: Negative.    Psychiatric/Behavioral: Negative.             Objective:               /80 (BP Location: Right arm, Patient Position: Sitting, Cuff Size: Standard)   Pulse 82   Temp 97.9 °F (36.6 °C) (Temporal)   Ht 6' (1.829 m)   Wt 93.3 kg (205 lb 9.6 oz)    SpO2 99%   BMI 27.88 kg/m²          Physical Exam  Vitals and nursing note reviewed.   Constitutional:       General: He is not in acute distress.     Appearance: He is ill-appearing. He is not toxic-appearing or diaphoretic.   HENT:      Head: Normocephalic and atraumatic.      Right Ear: Tympanic membrane, ear canal and external ear normal. There is no impacted cerumen.      Left Ear: Tympanic membrane, ear canal and external ear normal. There is no impacted cerumen.      Nose: Nose normal. No congestion or rhinorrhea.      Mouth/Throat:      Mouth: Mucous membranes are moist.      Pharynx: No oropharyngeal exudate or posterior oropharyngeal erythema.   Eyes:      General: No scleral icterus.        Right eye: No discharge.         Left eye: No discharge.   Neck:      Vascular: No carotid bruit.   Cardiovascular:      Rate and Rhythm: Normal rate and regular rhythm.      Pulses: Normal pulses.      Heart sounds: Normal heart sounds. No murmur heard.     No friction rub. No gallop.   Pulmonary:      Effort: Pulmonary effort is normal. No respiratory distress.      Breath sounds: Normal breath sounds. No stridor. No wheezing, rhonchi or rales.   Chest:      Chest wall: No tenderness.   Musculoskeletal:         General: No swelling, tenderness, deformity or signs of injury. Normal range of motion.      Cervical back: Normal range of motion and neck supple. No rigidity. No muscular tenderness.      Right lower leg: No edema.      Left lower leg: No edema.   Lymphadenopathy:      Cervical: No cervical adenopathy.   Skin:     General: Skin is warm and dry.      Capillary Refill: Capillary refill takes less than 2 seconds.      Coloration: Skin is not jaundiced.      Findings: No bruising, erythema, lesion or rash.   Neurological:      Mental Status: He is alert and oriented to person, place, and time. Mental status is at baseline.      Cranial Nerves: No cranial nerve deficit.      Sensory: No sensory deficit.       Motor: No weakness.      Coordination: Coordination normal.      Gait: Gait normal.   Psychiatric:         Mood and Affect: Mood normal.         Behavior: Behavior normal.         Thought Content: Thought content normal.         Judgment: Judgment normal.

## 2024-10-17 ENCOUNTER — TELEPHONE (OUTPATIENT)
Age: 49
End: 2024-10-17

## 2024-10-17 LAB
FLUAV RNA RESP QL NAA+PROBE: NEGATIVE
FLUBV RNA RESP QL NAA+PROBE: NEGATIVE
SARS-COV-2 RNA RESP QL NAA+PROBE: NEGATIVE

## 2024-10-17 NOTE — TELEPHONE ENCOUNTER
Patient contacted the office back this afternoon. Informed results are still active in process. Did relay provider response, ok for patient to start the antibiotic. Patient verbalized understanding, he will start. Please notify once results are back.

## 2024-10-17 NOTE — TELEPHONE ENCOUNTER
Patient contacted the office this morning looking for an update on the Covid/Flu- Office Collect Normal that was collected yesterday. Status active in process, still pending. Patient is waiting for results to know if he should start on the abx or not. Please contact patient back as soon as results are in chart.

## 2024-10-23 ENCOUNTER — VBI (OUTPATIENT)
Dept: ADMINISTRATIVE | Facility: OTHER | Age: 49
End: 2024-10-23

## 2024-10-23 NOTE — TELEPHONE ENCOUNTER
10/23/24 3:26 PM     Chart reviewed for CRC: Colonoscopy ; nothing is submitted to the patient's insurance at this time.     Hamida De Leon MA   [facility]

## 2024-11-15 PROBLEM — J06.9 ACUTE URI: Status: RESOLVED | Noted: 2024-10-16 | Resolved: 2024-11-15

## 2024-12-06 ENCOUNTER — OFFICE VISIT (OUTPATIENT)
Age: 49
End: 2024-12-06
Payer: COMMERCIAL

## 2024-12-06 VITALS
HEART RATE: 79 BPM | WEIGHT: 207 LBS | OXYGEN SATURATION: 98 % | HEIGHT: 71 IN | BODY MASS INDEX: 28.98 KG/M2 | SYSTOLIC BLOOD PRESSURE: 124 MMHG | RESPIRATION RATE: 16 BRPM | TEMPERATURE: 98.3 F | DIASTOLIC BLOOD PRESSURE: 68 MMHG

## 2024-12-06 DIAGNOSIS — S39.012A LUMBAR STRAIN, INITIAL ENCOUNTER: Primary | ICD-10-CM

## 2024-12-06 PROCEDURE — 99213 OFFICE O/P EST LOW 20 MIN: CPT | Performed by: FAMILY MEDICINE

## 2024-12-06 RX ORDER — DICLOFENAC SODIUM 75 MG/1
75 TABLET, DELAYED RELEASE ORAL 2 TIMES DAILY
Qty: 30 TABLET | Refills: 0 | Status: SHIPPED | OUTPATIENT
Start: 2024-12-06

## 2024-12-06 RX ORDER — CYCLOBENZAPRINE HCL 10 MG
10 TABLET ORAL 3 TIMES DAILY PRN
Qty: 20 TABLET | Refills: 0 | Status: SHIPPED | OUTPATIENT
Start: 2024-12-06

## 2024-12-06 NOTE — PROGRESS NOTES
"Assessment/Plan:       Diagnoses and all orders for this visit:    Lumbar strain, initial encounter  -     cyclobenzaprine (FLEXERIL) 10 mg tablet; Take 1 tablet (10 mg total) by mouth 3 (three) times a day as needed for muscle spasms  -     diclofenac (VOLTAREN) 75 mg EC tablet; Take 1 tablet (75 mg total) by mouth 2 (two) times a day            Subjective:        Patient ID: Kwame Stallings is a 49 y.o. male.      Patient is here with right sided muscle pain thoracolumbar region.  Patient to see chiropractor.  Patient using Motrin.  Patient using over-the-counter topical patches.  No radicular symptoms in the legs.  No problems with urination or defecation.  Patient's pain worse with twisting    Muscle Pain          The following portions of the patient's history were reviewed and updated as appropriate: allergies, current medications, past family history, past medical history, past social history, past surgical history and problem list.      Review of Systems   Constitutional: Negative.    HENT: Negative.     Eyes: Negative.    Respiratory: Negative.     Cardiovascular: Negative.    Gastrointestinal: Negative.    Endocrine: Negative.    Genitourinary: Negative.    Musculoskeletal:  Positive for back pain.   Skin: Negative.    Allergic/Immunologic: Negative.    Neurological: Negative.    Hematological: Negative.    Psychiatric/Behavioral: Negative.             Objective:               /68 (BP Location: Left arm, Patient Position: Sitting, Cuff Size: Large)   Pulse 79   Temp 98.3 °F (36.8 °C) (Temporal)   Resp 16   Ht 5' 11\" (1.803 m)   Wt 93.9 kg (207 lb)   SpO2 98%   BMI 28.87 kg/m²          Physical Exam  Constitutional:       General: He is not in acute distress.     Appearance: He is ill-appearing. He is not toxic-appearing or diaphoretic.      Comments: Patient in pain.  7 out of 10   HENT:      Head: Normocephalic and atraumatic.   Musculoskeletal:         General: Tenderness present.      " Comments: Right lumbar pain laterally.   Neurological:      Mental Status: He is alert.

## 2024-12-18 DIAGNOSIS — E78.00 HYPERCHOLESTEROLEMIA: ICD-10-CM

## 2024-12-18 DIAGNOSIS — F41.9 ANXIETY: ICD-10-CM

## 2024-12-19 RX ORDER — ROSUVASTATIN CALCIUM 10 MG/1
10 TABLET, COATED ORAL DAILY
Qty: 90 TABLET | Refills: 1 | Status: SHIPPED | OUTPATIENT
Start: 2024-12-19

## 2024-12-20 RX ORDER — CLONAZEPAM 2 MG/1
2 TABLET ORAL 3 TIMES DAILY
Qty: 90 TABLET | Refills: 0 | Status: SHIPPED | OUTPATIENT
Start: 2024-12-20

## 2025-01-28 LAB
ALBUMIN SERPL-MCNC: 4.8 G/DL (ref 3.5–5.7)
ALP SERPL-CCNC: 46 U/L (ref 35–120)
ALT SERPL-CCNC: 27 U/L
ANION GAP SERPL CALCULATED.3IONS-SCNC: 10 MMOL/L (ref 3–11)
AST SERPL-CCNC: 21 U/L
BASOPHILS # BLD AUTO: 0 THOU/CMM (ref 0–0.1)
BASOPHILS NFR BLD AUTO: 1 %
BILIRUB SERPL-MCNC: 0.7 MG/DL (ref 0.2–1)
BUN SERPL-MCNC: 20 MG/DL (ref 7–28)
CALCIUM SERPL-MCNC: 9.5 MG/DL (ref 8.5–10.5)
CHLORIDE SERPL-SCNC: 102 MMOL/L (ref 100–109)
CHOLEST SERPL-MCNC: 156 MG/DL
CHOLEST/HDLC SERPL: 3.3 {RATIO}
CO2 SERPL-SCNC: 28 MMOL/L (ref 21–31)
CREAT SERPL-MCNC: 0.84 MG/DL (ref 0.53–1.3)
CYTOLOGY CMNT CVX/VAG CYTO-IMP: ABNORMAL
DIFFERENTIAL METHOD BLD: NORMAL
EOSINOPHIL # BLD AUTO: 0.2 THOU/CMM (ref 0–0.5)
EOSINOPHIL NFR BLD AUTO: 3 %
ERYTHROCYTE [DISTWIDTH] IN BLOOD BY AUTOMATED COUNT: 12.8 % (ref 12–16)
EST. AVERAGE GLUCOSE BLD GHB EST-MCNC: 114 MG/DL
GFR/BSA.PRED SERPLBLD CYS-BASED-ARV: 106 ML/MIN/{1.73_M2}
GLUCOSE SERPL-MCNC: 100 MG/DL (ref 65–99)
HBA1C MFR BLD: 5.6 %
HCT VFR BLD AUTO: 47.6 % (ref 37–48)
HDLC SERPL-MCNC: 48 MG/DL (ref 23–92)
HGB BLD-MCNC: 16.1 G/DL (ref 12.5–17)
LDLC SERPL CALC-MCNC: 94 MG/DL
LYMPHOCYTES # BLD AUTO: 1.6 THOU/CMM (ref 1–3)
LYMPHOCYTES NFR BLD AUTO: 25 %
MCH RBC QN AUTO: 29.9 PG (ref 27–36)
MCHC RBC AUTO-ENTMCNC: 33.8 G/DL (ref 32–37)
MCV RBC AUTO: 89 FL (ref 80–100)
MONOCYTES # BLD AUTO: 0.6 THOU/CMM (ref 0.3–1)
MONOCYTES NFR BLD AUTO: 9 %
NEUTROPHILS # BLD AUTO: 3.9 THOU/CMM (ref 1.8–7.8)
NEUTROPHILS NFR BLD AUTO: 62 %
NONHDLC SERPL-MCNC: 108 MG/DL
PLATELET # BLD AUTO: 163 THOU/CMM (ref 140–350)
PMV BLD REES-ECKER: 8.3 FL (ref 7.5–11.3)
POTASSIUM SERPL-SCNC: 4.9 MMOL/L (ref 3.5–5.2)
PROT SERPL-MCNC: 6.9 G/DL (ref 6.3–8.3)
RBC # BLD AUTO: 5.38 MILL/CMM (ref 4–5.4)
SODIUM SERPL-SCNC: 140 MMOL/L (ref 135–145)
TRIGL SERPL-MCNC: 69 MG/DL
TSH SERPL-ACNC: 3.74 UIU/ML (ref 0.45–5.33)
WBC # BLD AUTO: 6.3 THOU/CMM (ref 4–10.5)

## 2025-02-10 ENCOUNTER — OFFICE VISIT (OUTPATIENT)
Age: 50
End: 2025-02-10
Payer: COMMERCIAL

## 2025-02-10 VITALS
HEIGHT: 71 IN | HEART RATE: 79 BPM | BODY MASS INDEX: 29.4 KG/M2 | DIASTOLIC BLOOD PRESSURE: 78 MMHG | TEMPERATURE: 97.7 F | SYSTOLIC BLOOD PRESSURE: 120 MMHG | OXYGEN SATURATION: 99 % | WEIGHT: 210 LBS

## 2025-02-10 DIAGNOSIS — E78.00 HYPERCHOLESTEROLEMIA: ICD-10-CM

## 2025-02-10 DIAGNOSIS — I10 PRIMARY HYPERTENSION: ICD-10-CM

## 2025-02-10 DIAGNOSIS — E11.9 TYPE 2 DIABETES MELLITUS WITHOUT COMPLICATION, WITHOUT LONG-TERM CURRENT USE OF INSULIN (HCC): Primary | ICD-10-CM

## 2025-02-10 PROCEDURE — 99214 OFFICE O/P EST MOD 30 MIN: CPT | Performed by: FAMILY MEDICINE

## 2025-02-10 NOTE — PROGRESS NOTES
Assessment/Plan:       Diagnoses and all orders for this visit:    Type 2 diabetes mellitus without complication, without long-term current use of insulin ([facility])  Comments:  A1c 5.6.  Continue with current treatment with dietary modification per  Orders:  -     Comprehensive metabolic panel; Future  -     CBC and differential; Future  -     Hemoglobin A1C; Future  -     Lipid panel; Future  -     TSH, 3rd generation with Free T4 reflex; Future  -     Comprehensive metabolic panel  -     CBC and differential  -     Hemoglobin A1C  -     Lipid panel  -     TSH, 3rd generation with Free T4 reflex    Primary hypertension  Comments:  Stable continue with current treatment  Orders:  -     Comprehensive metabolic panel; Future  -     CBC and differential; Future  -     Hemoglobin A1C; Future  -     Lipid panel; Future  -     TSH, 3rd generation with Free T4 reflex; Future  -     Comprehensive metabolic panel  -     CBC and differential  -     Hemoglobin A1C  -     Lipid panel  -     TSH, 3rd generation with Free T4 reflex    Hypercholesterolemia  Comments:  Improving continue with current treatment will reevaluate in the future  Orders:  -     Comprehensive metabolic panel; Future  -     CBC and differential; Future  -     Hemoglobin A1C; Future  -     Lipid panel; Future  -     TSH, 3rd generation with Free T4 reflex; Future  -     Comprehensive metabolic panel  -     CBC and differential  -     Hemoglobin A1C  -     Lipid panel  -     TSH, 3rd generation with Free T4 reflex            Subjective:        Patient ID: Kwame Stallings is a 50 y.o. male.      Patient here to follow-up on diabetes hypertension hyperlipidemia.  Patient feeling well overall.  Normal state of health.          The following portions of the patient's history were reviewed and updated as appropriate: allergies, current medications, past family history, past medical history, past social history, past surgical history and problem list.      Review of  "Systems   Constitutional: Negative.    HENT: Negative.     Eyes: Negative.    Respiratory: Negative.     Cardiovascular: Negative.    Gastrointestinal: Negative.    Endocrine: Negative.    Genitourinary: Negative.    Musculoskeletal: Negative.    Skin: Negative.    Allergic/Immunologic: Negative.    Neurological: Negative.    Hematological: Negative.    Psychiatric/Behavioral: Negative.             Objective:        Depression Screening and Follow-up Plan: Patient was screened for depression during today's encounter. They screened negative with a PHQ-2 score of 0.            /78 (BP Location: Left arm, Patient Position: Sitting, Cuff Size: Large)   Pulse 79   Temp 97.7 °F (36.5 °C) (Temporal)   Ht 5' 11\" (1.803 m)   Wt 95.3 kg (210 lb)   SpO2 99%   BMI 29.29 kg/m²          Physical Exam  Vitals and nursing note reviewed.   Constitutional:       General: He is not in acute distress.     Appearance: Normal appearance. He is not ill-appearing, toxic-appearing or diaphoretic.   HENT:      Head: Normocephalic and atraumatic.      Right Ear: Tympanic membrane, ear canal and external ear normal. There is no impacted cerumen.      Left Ear: Tympanic membrane, ear canal and external ear normal. There is no impacted cerumen.      Nose: Nose normal. No congestion or rhinorrhea.      Mouth/Throat:      Mouth: Mucous membranes are moist.      Pharynx: No oropharyngeal exudate or posterior oropharyngeal erythema.   Eyes:      General: No scleral icterus.        Right eye: No discharge.         Left eye: No discharge.   Neck:      Vascular: No carotid bruit.   Cardiovascular:      Rate and Rhythm: Normal rate and regular rhythm.      Pulses: Normal pulses.      Heart sounds: Normal heart sounds. No murmur heard.     No friction rub. No gallop.   Pulmonary:      Effort: Pulmonary effort is normal. No respiratory distress.      Breath sounds: Normal breath sounds. No stridor. No wheezing, rhonchi or rales.   Chest:      " Chest wall: No tenderness.   Musculoskeletal:         General: No swelling, tenderness, deformity or signs of injury. Normal range of motion.      Cervical back: Normal range of motion and neck supple. No rigidity. No muscular tenderness.      Right lower leg: No edema.      Left lower leg: No edema.   Lymphadenopathy:      Cervical: No cervical adenopathy.   Skin:     General: Skin is warm and dry.      Capillary Refill: Capillary refill takes less than 2 seconds.      Coloration: Skin is not jaundiced.      Findings: No bruising, erythema, lesion or rash.   Neurological:      Mental Status: He is alert and oriented to person, place, and time. Mental status is at baseline.      Cranial Nerves: No cranial nerve deficit.      Sensory: No sensory deficit.      Motor: No weakness.      Coordination: Coordination normal.      Gait: Gait normal.   Psychiatric:         Mood and Affect: Mood normal.         Behavior: Behavior normal.         Thought Content: Thought content normal.         Judgment: Judgment normal.

## 2025-02-26 DIAGNOSIS — F41.9 ANXIETY: ICD-10-CM

## 2025-02-28 RX ORDER — CLONAZEPAM 2 MG/1
2 TABLET ORAL 3 TIMES DAILY
Qty: 90 TABLET | Refills: 2 | Status: SHIPPED | OUTPATIENT
Start: 2025-02-28

## 2025-03-05 DIAGNOSIS — I10 ESSENTIAL HYPERTENSION: ICD-10-CM

## 2025-03-06 RX ORDER — LISINOPRIL 5 MG/1
5 TABLET ORAL DAILY
Qty: 90 TABLET | Refills: 1 | Status: SHIPPED | OUTPATIENT
Start: 2025-03-06

## 2025-03-18 DIAGNOSIS — E78.00 HYPERCHOLESTEROLEMIA: ICD-10-CM

## 2025-03-19 RX ORDER — ROSUVASTATIN CALCIUM 10 MG/1
10 TABLET, COATED ORAL DAILY
Qty: 90 TABLET | Refills: 1 | Status: SHIPPED | OUTPATIENT
Start: 2025-03-19

## 2025-05-20 ENCOUNTER — RESULTS FOLLOW-UP (OUTPATIENT)
Age: 50
End: 2025-05-20

## 2025-05-20 LAB
ALBUMIN SERPL-MCNC: 4.8 G/DL (ref 3.5–5.7)
ALP SERPL-CCNC: 49 U/L (ref 35–120)
ALT SERPL-CCNC: 32 U/L
ANION GAP SERPL CALCULATED.3IONS-SCNC: 7 MMOL/L (ref 3–11)
AST SERPL-CCNC: 27 U/L
BASOPHILS # BLD AUTO: 0 THOU/CMM (ref 0–0.1)
BASOPHILS NFR BLD AUTO: 0 %
BILIRUB SERPL-MCNC: 0.6 MG/DL (ref 0.2–1)
BUN SERPL-MCNC: 18 MG/DL (ref 7–28)
CALCIUM SERPL-MCNC: 9.1 MG/DL (ref 8.5–10.5)
CHLORIDE SERPL-SCNC: 102 MMOL/L (ref 100–109)
CHOLEST SERPL-MCNC: 139 MG/DL
CHOLEST/HDLC SERPL: 3.4 {RATIO}
CO2 SERPL-SCNC: 31 MMOL/L (ref 21–31)
CREAT SERPL-MCNC: 0.77 MG/DL (ref 0.53–1.3)
CYTOLOGY CMNT CVX/VAG CYTO-IMP: NORMAL
DIFFERENTIAL METHOD BLD: ABNORMAL
EOSINOPHIL # BLD AUTO: 0.3 THOU/CMM (ref 0–0.5)
EOSINOPHIL NFR BLD AUTO: 4 %
ERYTHROCYTE [DISTWIDTH] IN BLOOD BY AUTOMATED COUNT: 12.7 % (ref 12–16)
EST. AVERAGE GLUCOSE BLD GHB EST-MCNC: 108 MG/DL
GFR/BSA.PRED SERPLBLD CYS-BASED-ARV: 108 ML/MIN/{1.73_M2}
GLUCOSE SERPL-MCNC: 98 MG/DL (ref 65–99)
HBA1C MFR BLD: 5.4 %
HCT VFR BLD AUTO: 47.4 % (ref 37–48)
HDLC SERPL-MCNC: 41 MG/DL (ref 23–92)
HGB BLD-MCNC: 16.1 G/DL (ref 12.5–17)
LDLC SERPL CALC-MCNC: 83 MG/DL
LYMPHOCYTES # BLD AUTO: 1.7 THOU/CMM (ref 1–3)
LYMPHOCYTES NFR BLD AUTO: 25 %
MCH RBC QN AUTO: 29.5 PG (ref 27–36)
MCHC RBC AUTO-ENTMCNC: 34 G/DL (ref 32–37)
MCV RBC AUTO: 87 FL (ref 80–100)
MONOCYTES # BLD AUTO: 0.6 THOU/CMM (ref 0.3–1)
MONOCYTES NFR BLD AUTO: 9 %
NEUTROPHILS # BLD AUTO: 4.3 THOU/CMM (ref 1.8–7.8)
NEUTROPHILS NFR BLD AUTO: 62 %
NONHDLC SERPL-MCNC: 98 MG/DL
PLATELET # BLD AUTO: 179 THOU/CMM (ref 140–350)
PMV BLD REES-ECKER: 8 FL (ref 7.5–11.3)
POTASSIUM SERPL-SCNC: 4.8 MMOL/L (ref 3.5–5.2)
PROT SERPL-MCNC: 7.1 G/DL (ref 6.3–8.3)
RBC # BLD AUTO: 5.45 MILL/CMM (ref 4–5.4)
SODIUM SERPL-SCNC: 140 MMOL/L (ref 135–145)
TRIGL SERPL-MCNC: 76 MG/DL
TSH SERPL-ACNC: 3.58 UIU/ML (ref 0.45–5.33)
WBC # BLD AUTO: 7 THOU/CMM (ref 4–10.5)

## 2025-06-02 ENCOUNTER — OFFICE VISIT (OUTPATIENT)
Age: 50
End: 2025-06-02
Payer: COMMERCIAL

## 2025-06-02 VITALS
HEART RATE: 71 BPM | TEMPERATURE: 97.9 F | BODY MASS INDEX: 28.56 KG/M2 | HEIGHT: 71 IN | SYSTOLIC BLOOD PRESSURE: 120 MMHG | DIASTOLIC BLOOD PRESSURE: 78 MMHG | OXYGEN SATURATION: 99 % | WEIGHT: 204 LBS

## 2025-06-02 DIAGNOSIS — Z00.00 ANNUAL PHYSICAL EXAM: Primary | ICD-10-CM

## 2025-06-02 DIAGNOSIS — E11.9 TYPE 2 DIABETES MELLITUS WITHOUT COMPLICATION, WITHOUT LONG-TERM CURRENT USE OF INSULIN (HCC): ICD-10-CM

## 2025-06-02 DIAGNOSIS — Z12.5 SCREENING FOR PROSTATE CANCER: ICD-10-CM

## 2025-06-02 DIAGNOSIS — I10 ESSENTIAL HYPERTENSION: ICD-10-CM

## 2025-06-02 LAB
ALBUMIN/CREAT UR: NORMAL
CREAT UR-MCNC: 101.4 MG/DL (ref 50–200)
MICROALBUMIN UR-MCNC: 0.7 MG/DL

## 2025-06-02 PROCEDURE — 99396 PREV VISIT EST AGE 40-64: CPT | Performed by: FAMILY MEDICINE

## 2025-06-02 RX ORDER — LISINOPRIL 5 MG/1
5 TABLET ORAL DAILY
Qty: 90 TABLET | Refills: 1 | Status: SHIPPED | OUTPATIENT
Start: 2025-06-02

## 2025-06-02 NOTE — PATIENT INSTRUCTIONS
"Patient Education     Routine physical for adults   The Basics   Written by the doctors and editors at [facility]   What is a physical? -- A physical is a routine visit, or \"check-up,\" with your doctor. You might also hear it called a \"wellness visit\" or \"preventive visit.\"  During each visit, the doctor will:   Ask about your physical and mental health   Ask about your habits, behaviors, and lifestyle   Do an exam   Give you vaccines if needed   Talk to you about any medicines you take   Give advice about your health   Answer your questions  Getting regular check-ups is an important part of taking care of your health. It can help your doctor find and treat any problems you have. But it's also important for preventing health problems.  A routine physical is different from a \"sick visit.\" A sick visit is when you see a doctor because of a health concern or problem. Since physicals are scheduled ahead of time, you can think about what you want to ask the doctor.  How often should I get a physical? -- It depends on your age and health. In general, for people age 21 years and older:   If you are younger than 50 years, you might be able to get a physical every 3 years.   If you are 50 years or older, your doctor might recommend a physical every year.  If you have an ongoing health condition, like diabetes or high blood pressure, your doctor will probably want to see you more often.  What happens during a physical? -- In general, each visit will include:   Physical exam - The doctor or nurse will check your height, weight, heart rate, and blood pressure. They will also look at your eyes and ears. They will ask about how you are feeling and whether you have any symptoms that bother you.   Medicines - It's a good idea to bring a list of all the medicines you take to each doctor visit. Your doctor will talk to you about your medicines and answer any questions. Tell them if you are having any side effects that bother you. You " "should also tell them if you are having trouble paying for any of your medicines.   Habits and behaviors - This includes:   Your diet   Your exercise habits   Whether you smoke, drink alcohol, or use drugs   Whether you are sexually active   Whether you feel safe at home  Your doctor will talk to you about things you can do to improve your health and lower your risk of health problems. They will also offer help and support. For example, if you want to quit smoking, they can give you advice and might prescribe medicines. If you want to improve your diet or get more physical activity, they can help you with this, too.   Lab tests, if needed - The tests you get will depend on your age and situation. For example, your doctor might want to check your:   Cholesterol   Blood sugar   Iron level   Vaccines - The recommended vaccines will depend on your age, health, and what vaccines you already had. Vaccines are very important because they can prevent certain serious or deadly infections.   Discussion of screening - \"Screening\" means checking for diseases or other health problems before they cause symptoms. Your doctor can recommend screening based on your age, risk, and preferences. This might include tests to check for:   Cancer, such as breast, prostate, cervical, ovarian, colorectal, prostate, lung, or skin cancer   Sexually transmitted infections, such as chlamydia and gonorrhea   Mental health conditions like depression and anxiety  Your doctor will talk to you about the different types of screening tests. They can help you decide which screenings to have. They can also explain what the results might mean.   Answering questions - The physical is a good time to ask the doctor or nurse questions about your health. If needed, they can refer you to other doctors or specialists, too.  Adults older than 65 years often need other care, too. As you get older, your doctor will talk to you about:   How to prevent falling at " home   Hearing or vision tests   Memory testing   How to take your medicines safely   Making sure that you have the help and support you need at home  All topics are updated as new evidence becomes available and our peer review process is complete.  This topic retrieved from [employer] on: May 02, 2024.  Topic 610622 Version 1.0  Release: 32.4.3 - C32.122  © 2024 UpToDate, Inc[employer] and/or its affiliates. All rights reserved.  Consumer Information Use and Disclaimer   Disclaimer: This generalized information is a limited summary of diagnosis, treatment, and/or medication information. It is not meant to be comprehensive and should be used as a tool to help the user understand and/or assess potential diagnostic and treatment options. It does NOT include all information about conditions, treatments, medications, side effects, or risks that may apply to a specific patient. It is not intended to be medical advice or a substitute for the medical advice, diagnosis, or treatment of a health care provider based on the health care provider's examination and assessment of a patient's specific and unique circumstances. Patients must speak with a health care provider for complete information about their health, medical questions, and treatment options, including any risks or benefits regarding use of medications. This information does not endorse any treatments or medications as safe, effective, or approved for treating a specific patient. Up[employer]Date, Inc[employer] and its affiliates disclaim any warranty or liability relating to this information or the use thereof.The use of this information is governed by the Terms of Use, available at https://www.woltersStruquwer.com/en/know/clinical-effectiveness-terms. 2024© UpToDate, Inc. and its affiliates and/or licensors. All rights reserved.  Copyright   © 2024 Up[employer], [employer] and/or its affiliates. All rights reserved.

## 2025-06-02 NOTE — PROGRESS NOTES
Adult Annual Physical  Name: Kwame Stallings      : 1975      MRN: [id number]  Encounter Provider: Alek Parnell DO  Encounter Date: 2025   Encounter department: [facility]    :  Assessment & Plan  Annual physical exam  This time.  Patient will laboratory studies done.  Patient will consider colorectal screening at follow-up visit.       Type 2 diabetes mellitus without complication, without long-term current use of insulin (HCC)    Lab Results   Component Value Date    HGBA1C 5.4 2025       Orders:    CBC and differential; Future    Comprehensive metabolic panel; Future    Hemoglobin A1C; Future    Lipid panel; Future    TSH, 3rd generation with Free T4 reflex; Future    Essential hypertension    Orders:    lisinopril (ZESTRIL) 5 mg tablet; Take 1 tablet (5 mg total) by mouth daily    CBC and differential; Future    Comprehensive metabolic panel; Future    Hemoglobin A1C; Future    Lipid panel; Future    TSH, 3rd generation with Free T4 reflex; Future    Screening for prostate cancer    Orders:    PSA, Total Screen; Future        Preventive Screenings:  - Diabetes Screening: screening not indicated, has diabetes, risks/benefits discussed and screening up-to-date  - Cholesterol Screening: screening not indicated, has hyperlipidemia, risks/benefits discussed and screening up-to-date   - Hepatitis C screening: screening up-to-date and risks/benefits discussed   - HIV screening: risks/benefits discussed and patient declines   - Colon cancer screening: risks/benefits discussed and patient declines   - Lung cancer screening: screening not indicated and risks/benefits discussed   - Prostate cancer screening: risks/benefits discussed and orders placed     Immunizations:  - Immunizations due: Prevnar 20 and Zoster (Shingrix)  - Risks/benefits immunizations discussed    - The patient declines recommended vaccines currently despite my recommendations      Counseling/Anticipatory  Guidance:  - Alcohol: discussed moderation in alcohol intake and recommendations for healthy alcohol use.   - Drug use: discussed harms of illicit drug use and how it can negatively impact mental/physical health.   - Tobacco use: discussed harms of tobacco use and management options for quitting.   - Dental health: discussed importance of regular tooth brushing, flossing, and dental visits.   - Diet: discussed recommendations for a healthy/well-balanced diet.   - Exercise: the importance of regular exercise/physical activity was discussed. Recommend exercise 3-5 times per week for at least 30 minutes.   - Injury prevention: discussed safety/seat belts, safety helmets, smoke detectors, carbon monoxide detectors, and smoking near bedding or upholstery.       Depression Screening and Follow-up Plan: Patient was screened for depression during today's encounter. They screened negative with a PHQ-2 score of 0.          History of Present Illness     Adult Annual Physical:  Patient presents for annual physical. Patient is here for wellness exam..     Diet and Physical Activity:  - Diet/Nutrition: diabetic diet.  - Exercise: walking, moderate cardiovascular exercise and strength training exercises.    Depression Screening:  - PHQ-2 Score: 0    General Health:  - Sleep: sleeps well.  - Hearing: normal hearing right ear.  - Vision: most recent eye exam < 1 year ago and wears glasses.  - Dental: regular dental visits.     Health:  - History of STDs: no.   - Urinary symptoms: none.     Review of Systems   Constitutional: Negative.    HENT: Negative.     Eyes: Negative.    Respiratory: Negative.     Cardiovascular: Negative.    Gastrointestinal: Negative.    Endocrine: Negative.    Genitourinary: Negative.    Musculoskeletal:  Positive for back pain.   Skin: Negative.    Allergic/Immunologic: Negative.    Neurological: Negative.    Hematological: Negative.    Psychiatric/Behavioral: Negative.           Objective   /78  "(BP Location: Right arm, Patient Position: Sitting, Cuff Size: Large)   Pulse 71   Temp 97.9 °F (36.6 °C) (Temporal)   Ht 5' 11\" (1.803 m)   Wt 92.5 kg (204 lb)   SpO2 99%   BMI 28.45 kg/m²     Physical Exam  Vitals and nursing note reviewed.   Constitutional:       General: He is not in acute distress.     Appearance: Normal appearance. He is well-developed. He is not ill-appearing, toxic-appearing or diaphoretic.   HENT:      Head: Normocephalic and atraumatic.      Right Ear: Tympanic membrane, ear canal and external ear normal.      Left Ear: Tympanic membrane, ear canal and external ear normal.      Nose: Nose normal.      Mouth/Throat:      Mouth: Mucous membranes are moist.      Pharynx: No oropharyngeal exudate or posterior oropharyngeal erythema.     Eyes:      General:         Right eye: No discharge.         Left eye: No discharge.     Neck:      Thyroid: No thyromegaly.      Vascular: No carotid bruit.      Trachea: No tracheal deviation.     Cardiovascular:      Rate and Rhythm: Normal rate and regular rhythm.      Pulses: Normal pulses.      Heart sounds: Normal heart sounds. No murmur heard.     No gallop.   Pulmonary:      Effort: Pulmonary effort is normal. No respiratory distress.      Breath sounds: Normal breath sounds. No stridor. No wheezing or rales.   Chest:      Chest wall: No tenderness.   Abdominal:      General: Bowel sounds are normal.     Musculoskeletal:         General: No tenderness or deformity. Normal range of motion.      Cervical back: Normal range of motion and neck supple.      Right lower leg: No edema.      Left lower leg: No edema.   Lymphadenopathy:      Cervical: No cervical adenopathy.     Skin:     General: Skin is warm and dry.      Capillary Refill: Capillary refill takes less than 2 seconds.      Coloration: Skin is not pale.      Findings: No erythema or rash.     Neurological:      Mental Status: He is alert and oriented to person, place, and time. Mental " status is at baseline.      Motor: No abnormal muscle tone.      Gait: Gait normal.     Psychiatric:         Mood and Affect: Mood normal.         Behavior: Behavior normal.         Thought Content: Thought content normal.         Judgment: Judgment normal.

## 2025-06-02 NOTE — ASSESSMENT & PLAN NOTE
Lab Results   Component Value Date    HGBA1C 5.4 05/20/2025       Orders:    CBC and differential; Future    Comprehensive metabolic panel; Future    Hemoglobin A1C; Future    Lipid panel; Future    TSH, 3rd generation with Free T4 reflex; Future

## 2025-06-03 ENCOUNTER — TELEPHONE (OUTPATIENT)
Age: 50
End: 2025-06-03

## 2025-06-03 NOTE — TELEPHONE ENCOUNTER
Spoke with Gladys and she is aware lab was collected and sent to [facility] as this is the patients preferred lab.

## 2025-06-03 NOTE — TELEPHONE ENCOUNTER
Juan Luis from the [facility] called in stating that the creatine urine that was collected from patient yesterday did not have a packing juan luis would like to know was it even collected or is it being sent today she is requesting a call back at [phone] option 1 Please advise

## 2025-06-17 LAB
LEFT EYE DIABETIC RETINOPATHY: NORMAL
RIGHT EYE DIABETIC RETINOPATHY: NORMAL

## 2025-07-10 ENCOUNTER — VBI (OUTPATIENT)
Dept: ADMINISTRATIVE | Facility: OTHER | Age: 50
End: 2025-07-10

## 2025-07-10 NOTE — TELEPHONE ENCOUNTER
07/10/25 3:13 PM     Chart reviewed for [facility]: Colonoscopy was/were not submitted to the patient's insurance.     Hamida De Leon MA   [facility]

## (undated) DEVICE — CHLORAPREP HI-LITE 26ML ORANGE

## (undated) DEVICE — MASTISOL LIQ ADHESIVE 2/3ML

## (undated) DEVICE — SPONGE LAP STERILE 4X18

## (undated) DEVICE — MINOR PROCEDURE DRAPE: Brand: CONVERTORS

## (undated) DEVICE — PROXIMATE PLUS MD MULTI-DIRECTIONAL RELEASE SKIN STAPLERS CONTAINS 35 STAINLESS STEEL STAPLES APPROXIMATE CLOSED DIMENSIONS: 6.9MM X 3.9MM WIDE: Brand: PROXIMATE

## (undated) DEVICE — SYRINGE 10ML LL CONTROL TOP

## (undated) DEVICE — X-RAY DETECTABLE SPONGES,16 PLY: Brand: VISTEC

## (undated) DEVICE — PAD GROUNDING ADULT

## (undated) DEVICE — STANDARD SURGICAL GOWN, L: Brand: CONVERTORS

## (undated) DEVICE — 4-PORT MANIFOLD: Brand: NEPTUNE 2

## (undated) DEVICE — NEEDLE 25G X 1 1/2

## (undated) DEVICE — DISPOSABLE SPECIMEN RADIOGRAPHY SYSTEM WITH LOCALIZING GRID, 4.65" RADIOLUCENT GRID: Brand: ACCUGRID

## (undated) DEVICE — STERILE POLYISOPRENE POWDER-FREE SURGICAL GLOVES: Brand: PROTEXIS

## (undated) DEVICE — SUT ETHILON 3-0 PS-1 18 IN 1663H

## (undated) DEVICE — INTENDED FOR TISSUE SEPARATION, AND OTHER PROCEDURES THAT REQUIRE A SHARP SURGICAL BLADE TO PUNCTURE OR CUT.: Brand: BARD-PARKER SAFETY BLADES SIZE 10, STERILE

## (undated) DEVICE — SURGICAL CLIPPER BLADE GENERAL USE

## (undated) DEVICE — SKIN MARKER DUAL TIP WITH RULER CAP, FLEXIBLE RULER AND LABELS: Brand: DEVON

## (undated) DEVICE — PENCIL ELECTROSURG E-Z CLEAN -0035H

## (undated) DEVICE — BASIC PACK: Brand: CONVERTORS

## (undated) DEVICE — FLEXIBLE ADHESIVE BANDAGE,X-LARGE: Brand: CURITY

## (undated) DEVICE — GAUZE SPONGES,16 PLY: Brand: CURITY

## (undated) DEVICE — POOLE SUCTION INSTRUMENT WITH REMOVABLE SHEATH AND PREATTACHED 6 FT. (1.8M) CLEAR PLASTIC TUBING: Brand: POOLE

## (undated) DEVICE — INTENDED FOR TISSUE SEPARATION, AND OTHER PROCEDURES THAT REQUIRE A SHARP SURGICAL BLADE TO PUNCTURE OR CUT.: Brand: BARD-PARKER SAFETY BLADES SIZE 15, STERILE

## (undated) DEVICE — NDL CNTR 20CT FM MAG: Brand: MEDLINE INDUSTRIES, INC.

## (undated) DEVICE — SUT ETHILON 3-0 FSLX 30 IN 1673H

## (undated) DEVICE — GARMENT,MEDLINE,DVT,INT,CALF,FOAM,MED: Brand: MEDLINE

## (undated) DEVICE — BULB SYRINGE,IRRIGATION WITH PROTECTIVE CAP: Brand: DOVER